# Patient Record
Sex: MALE | Race: WHITE | Employment: UNEMPLOYED | URBAN - METROPOLITAN AREA
[De-identification: names, ages, dates, MRNs, and addresses within clinical notes are randomized per-mention and may not be internally consistent; named-entity substitution may affect disease eponyms.]

---

## 2020-10-20 ENCOUNTER — OFFICE VISIT (OUTPATIENT)
Dept: INTERNAL MEDICINE CLINIC | Age: 25
End: 2020-10-20
Payer: MEDICAID

## 2020-10-20 VITALS
DIASTOLIC BLOOD PRESSURE: 89 MMHG | TEMPERATURE: 98.3 F | WEIGHT: 221 LBS | HEIGHT: 70 IN | HEART RATE: 78 BPM | SYSTOLIC BLOOD PRESSURE: 140 MMHG | BODY MASS INDEX: 31.64 KG/M2

## 2020-10-20 PROBLEM — F33.2 SEVERE EPISODE OF RECURRENT MAJOR DEPRESSIVE DISORDER, WITHOUT PSYCHOTIC FEATURES (HCC): Status: ACTIVE | Noted: 2020-10-20

## 2020-10-20 PROCEDURE — 90686 IIV4 VACC NO PRSV 0.5 ML IM: CPT | Performed by: INTERNAL MEDICINE

## 2020-10-20 PROCEDURE — 90715 TDAP VACCINE 7 YRS/> IM: CPT | Performed by: INTERNAL MEDICINE

## 2020-10-20 PROCEDURE — G8482 FLU IMMUNIZE ORDER/ADMIN: HCPCS | Performed by: INTERNAL MEDICINE

## 2020-10-20 PROCEDURE — 99204 OFFICE O/P NEW MOD 45 MIN: CPT | Performed by: INTERNAL MEDICINE

## 2020-10-20 PROCEDURE — 90472 IMMUNIZATION ADMIN EACH ADD: CPT | Performed by: INTERNAL MEDICINE

## 2020-10-20 PROCEDURE — 1036F TOBACCO NON-USER: CPT | Performed by: INTERNAL MEDICINE

## 2020-10-20 PROCEDURE — 96160 PT-FOCUSED HLTH RISK ASSMT: CPT | Performed by: INTERNAL MEDICINE

## 2020-10-20 PROCEDURE — G8427 DOCREV CUR MEDS BY ELIG CLIN: HCPCS | Performed by: INTERNAL MEDICINE

## 2020-10-20 PROCEDURE — G8417 CALC BMI ABV UP PARAM F/U: HCPCS | Performed by: INTERNAL MEDICINE

## 2020-10-20 PROCEDURE — 90471 IMMUNIZATION ADMIN: CPT | Performed by: INTERNAL MEDICINE

## 2020-10-20 RX ORDER — ESCITALOPRAM OXALATE 20 MG/1
20 TABLET ORAL DAILY
COMMUNITY
End: 2020-11-10 | Stop reason: SDUPTHER

## 2020-10-20 RX ORDER — LAMOTRIGINE 200 MG/1
200 TABLET ORAL DAILY
COMMUNITY
End: 2020-11-10 | Stop reason: SDUPTHER

## 2020-10-20 RX ORDER — ACETAMINOPHEN, ASPIRIN AND CAFFEINE 250; 250; 65 MG/1; MG/1; MG/1
1 TABLET, FILM COATED ORAL EVERY 6 HOURS PRN
Qty: 90 TABLET | Refills: 3 | Status: SHIPPED | OUTPATIENT
Start: 2020-10-20

## 2020-10-20 RX ORDER — CLONIDINE HYDROCHLORIDE 0.1 MG/1
0.1 TABLET ORAL 2 TIMES DAILY
COMMUNITY
End: 2020-10-20 | Stop reason: SINTOL

## 2020-10-20 SDOH — HEALTH STABILITY: MENTAL HEALTH: HOW OFTEN DO YOU HAVE A DRINK CONTAINING ALCOHOL?: MONTHLY OR LESS

## 2020-10-20 ASSESSMENT — PATIENT HEALTH QUESTIONNAIRE - PHQ9
4. FEELING TIRED OR HAVING LITTLE ENERGY: 3
8. MOVING OR SPEAKING SO SLOWLY THAT OTHER PEOPLE COULD HAVE NOTICED. OR THE OPPOSITE, BEING SO FIGETY OR RESTLESS THAT YOU HAVE BEEN MOVING AROUND A LOT MORE THAN USUAL: 3
SUM OF ALL RESPONSES TO PHQ QUESTIONS 1-9: 23
9. THOUGHTS THAT YOU WOULD BE BETTER OFF DEAD, OR OF HURTING YOURSELF: 1
3. TROUBLE FALLING OR STAYING ASLEEP: 3
SUM OF ALL RESPONSES TO PHQ QUESTIONS 1-9: 22
10. IF YOU CHECKED OFF ANY PROBLEMS, HOW DIFFICULT HAVE THESE PROBLEMS MADE IT FOR YOU TO DO YOUR WORK, TAKE CARE OF THINGS AT HOME, OR GET ALONG WITH OTHER PEOPLE: 1
1. LITTLE INTEREST OR PLEASURE IN DOING THINGS: 3
SUM OF ALL RESPONSES TO PHQ QUESTIONS 1-9: 23
2. FEELING DOWN, DEPRESSED OR HOPELESS: 2
6. FEELING BAD ABOUT YOURSELF - OR THAT YOU ARE A FAILURE OR HAVE LET YOURSELF OR YOUR FAMILY DOWN: 2
5. POOR APPETITE OR OVEREATING: 3
SUM OF ALL RESPONSES TO PHQ9 QUESTIONS 1 & 2: 5
7. TROUBLE CONCENTRATING ON THINGS, SUCH AS READING THE NEWSPAPER OR WATCHING TELEVISION: 3

## 2020-10-20 NOTE — PROGRESS NOTES
Vaccine Information Sheet, \"Influenza - Inactivated\"  given to Raúl Ramsey, or parent/legal guardian of  Raúl Ramsey and verbalized understanding. Patient responses:    Have you ever had a reaction to a flu vaccine? No  Do you have any current illness? No  Have you ever had [place] Syndrome? No  Do you have a serious allergy to any of the follow: Neomycin, Polymyxin, Thimerosal, eggs or egg products? No    Flu vaccine given per order. Please see immunization tab. Risks and benefits explained. Current VIS given.       Immunizations Administered     Name Date Dose Route    Influenza, Quadv, IM, PF (6 mo and older Fluzone, Flulaval, Fluarix, and 3 yrs and older Afluria) 10/20/2020 0.5 mL Intramuscular    Site: Deltoid- Left    Lot: [id number]    NDC: 35228-336-68

## 2020-10-20 NOTE — PROGRESS NOTES
10/20/2020    Raúl Ramsey (:  1995) is a 26 y.o. male, here for evaluation of the following medical concerns:    HPI    Depression/anxiety/bipolar 2 - has been on depression medications for 2-3 years. Has seen psychiatrists in the past. Has not seen any psych in a year because of moving to [place], etc. Finished grad school classes [place]. Moved briefly to [facility] and now here. Working on his thesis for [employer]. Deciding on next steps. Started to have an eye twitch after staring meds and then was rx'ed clonidine. Thinks it was for the eye twitching. Clonidine was also rx'ed by psych as well. This is the most recent addition and he things it was in the last 9 months or so it was added. Bps had been elevated as well with a family history of hypertension. He questions the bipolar diagnosis. Has not been hospitalized. No recent suicidal thoughts. Has had in past. Last about 6-8 months ago. Mostly passive. PHQ Scores 10/20/2020   PHQ2 Score 5   PHQ9 Score 23     Interpretation of Total Score Depression Severity: 1-4 = Minimal depression, 5-9 = Mild depression, 10-14 = Moderate depression, 15-19 = Moderately severe depression, 20-27 = Severe depression    Headaches - daily headache in the afternoon/evening. They are dull. Not super painful. Keep from focusing. Located center of forehead. Does have some nasal congestions. Has allergic rhinitis. Sleep makes better. Started over the last few months. After grad school classes were over. Occasionally will take a tylenol or aspirin - doesn't really help when he does. Although they are not severe they interfere with his ability to focus and he finds them quite distracting. He feels they are significantly affecting his mood and quality of life and is distressed by them. Review of Systems   Constitutional: Positive for diaphoresis, fatigue and unexpected weight change. Negative for appetite change and fever.         No night sweats HENT: Negative for congestion, ear pain, hearing loss, nosebleeds, sinus pressure, sinus pain, sneezing, tinnitus and trouble swallowing. Eyes: Negative for photophobia, pain and visual disturbance. Respiratory: Positive for cough and shortness of breath. Negative for wheezing. Cardiovascular: Negative for chest pain, palpitations and leg swelling. Gastrointestinal: Positive for abdominal pain and blood in stool (hemorrhoids). Negative for constipation, diarrhea, nausea and vomiting. +GERD   Endocrine: Negative for cold intolerance, heat intolerance, polydipsia, polyphagia and polyuria. Genitourinary: Negative for difficulty urinating, dysuria, frequency, hematuria and urgency. Musculoskeletal: Negative for arthralgias, back pain, joint swelling, myalgias and neck pain. Skin: Negative for color change and rash. Allergic/Immunologic: Negative for environmental allergies, food allergies and immunocompromised state. Neurological: Positive for headaches. Negative for dizziness, syncope, speech difficulty, weakness, light-headedness and numbness. Hematological: Negative for adenopathy. Does not bruise/bleed easily. Psychiatric/Behavioral: Positive for decreased concentration and dysphoric mood. Negative for sleep disturbance. The patient is not nervous/anxious. Prior to Visit Medications    Medication Sig Taking?  Authorizing Provider   lamoTRIgine (LAMICTAL) 200 MG tablet Take 200 mg by mouth daily Yes Historical Provider, MD   cloNIDine (CATAPRES) 0.1 MG tablet Take 0.1 mg by mouth 2 times daily Yes Historical Provider, MD   escitalopram (LEXAPRO) 20 MG tablet Take 20 mg by mouth daily Yes Historical Provider, MD   aspirin-acetaminophen-caffeine (EXCEDRIN MIGRAINE) 804-847-93 MG per tablet Take 1 tablet by mouth every 6 hours as needed for Headaches Yes Josie Canales MD        Allergies   Allergen Reactions    No Known Allergies        Past Medical History:   Diagnosis Date    Depression        History reviewed. No pertinent surgical history. Social History     Socioeconomic History    Marital status: Single     Spouse name: Not on file    Number of children: Not on file    Years of education: Not on file    Highest education level: Not on file   Occupational History    Not on file   Social Needs    Financial resource strain: Not on file    Food insecurity     Worry: Not on file     Inability: Not on file    Transportation needs     Medical: Not on file     Non-medical: Not on file   Tobacco Use    Smoking status: Never Smoker    Smokeless tobacco: Never Used   Substance and Sexual Activity    Alcohol use: Yes     Frequency: Monthly or less    Drug use: Not on file    Sexual activity: Not on file   Lifestyle    Physical activity     Days per week: Not on file     Minutes per session: Not on file    Stress: Not on file   Relationships    Social connections     Talks on phone: Not on file     Gets together: Not on file     Attends [employer] service: Not on file     Active member of club or organization: Not on file     Attends meetings of clubs or organizations: Not on file     Relationship status: Not on file    Intimate partner violence     Fear of current or ex partner: Not on file     Emotionally abused: Not on file     Physically abused: Not on file     Forced sexual activity: Not on file   Other Topics Concern    Not on file   Social History Narrative    Finishing grad school    Moved to [place] in 2020.     2 cats.          Family History   Problem Relation Age of Onset   Ardyth Moritz Cancer Mother         melanoma    Hypertension Mother     Hypertension Father        Vitals:    10/20/20 1515   BP: (!) 140/89   Site: Right Upper Arm   Position: Sitting   Cuff Size: Large Adult   Pulse: 78   Temp: 98.3 °F (36.8 °C)   Weight: 221 lb (100.2 kg)   Height: 5' 10\" (1.778 m)     Estimated body mass index is 31.71 kg/m² as calculated from the following:    Height as of this encounter: 5' 10\" (1.778 m). Weight as of this encounter: 221 lb (100.2 kg). Physical Exam  Constitutional:       Appearance: He is not ill-appearing or toxic-appearing. Comments: Overweight  Mild speech impediment noted   HENT:      Head: Normocephalic and atraumatic. Right Ear: Tympanic membrane, ear canal and external ear normal.      Left Ear: Tympanic membrane, ear canal and external ear normal.      Nose: Nose normal.      Mouth/Throat:      Pharynx: Oropharynx is clear. Eyes:      Conjunctiva/sclera: Conjunctivae normal.      Pupils: Pupils are equal, round, and reactive to light. Neck:      Musculoskeletal: Normal range of motion and neck supple. Cardiovascular:      Rate and Rhythm: Normal rate and regular rhythm. Pulmonary:      Effort: Pulmonary effort is normal.      Breath sounds: Normal breath sounds. Abdominal:      General: Abdomen is flat. Bowel sounds are normal.      Palpations: Abdomen is soft. Musculoskeletal: Normal range of motion. Right lower leg: No edema. Left lower leg: No edema. Skin:     General: Skin is warm and dry. Findings: No rash. Neurological:      General: No focal deficit present. Mental Status: He is alert. Sensory: No sensory deficit. Motor: No weakness. Coordination: Coordination normal.      Gait: Gait normal.   Psychiatric:         Mood and Affect: Mood normal.         Behavior: Behavior normal.         Thought Content: Thought content normal.         Judgment: Judgment normal.         ASSESSMENT/PLAN:  Monroe Whalen was seen today for established new doctor. Diagnoses and all orders for this visit:    Chronic daily headache  Given timing of headaches and that he is taking clonidine once daily at night I think the clonidine could be the cause of his headaches. I would like to stop this medication as it does not seem he has had much benefit from it to begin with.  We will need to watch his blood pressure (although do not think was helping much with that with once per day dosing and in fact may have been doing more harm than good). In the meantime he can use excedrin for more severe headaches for which he needs to take something. Work on allergy control. Close follow up to examine need for ppx. No red flags for need for imaging at this time. -     aspirin-acetaminophen-caffeine (EXCEDRIN MIGRAINE) 250-250-65 MG per tablet; Take 1 tablet by mouth every 6 hours as needed for Headaches    Allergic rhinitis, unspecified seasonality, unspecified trigger  Wants referral for allergy testing. Discussed to hold antihistamines for 2 weeks prior to appt. -     External Referral To Allergy    Need for influenza vaccination  -     INFLUENZA, QUADV, 3 YRS AND OLDER, IM PF, PREFILL SYR OR SDV, 0.5ML (AFLURIA QUADV, PF)    Need for Tdap vaccination  -     Tdap (age 6y and older) IM (BOOSTRIX)    Severe episode of recurrent major depressive disorder, without psychotic features ([facility] [address]  Depression not well controlled based on PHQ scores. 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 Will have him start [facility] and refer to Psych provider for further examination of diagnosis and treatment options. For now d/c clonidine and continue lamictal 200mg daily and lexapro 20mg daily. Return for 4-6 weeks headaches; next available for Dr. Abiola Hooker. .    An  electronic signature was used to authenticate this note.     --Mackenzie Roa MD on 10/20/2020 at 4:04 PM

## 2020-10-23 ENCOUNTER — VIRTUAL VISIT (OUTPATIENT)
Dept: PSYCHOLOGY | Age: 25
End: 2020-10-23
Payer: MEDICAID

## 2020-10-23 PROCEDURE — 90791 PSYCH DIAGNOSTIC EVALUATION: CPT | Performed by: PSYCHOLOGIST

## 2020-10-23 NOTE — PROGRESS NOTES
Behavioral Health Consultation  Malathi Ford Psy.D. Psychologist  10/23/2020  Start time 3:28pm Stop time 4:00pm    Time spent with Patient: 32 minutes  This is patient's first [facility] appointment. Reason for Consult:  mood  Referring Provider: PCP    Feedback for PCP: No action needed. Megan Lyle will continue to follow pt and obtain records from provider who diagnosed patient with bipolar disorder. At initial visit, pt/guardian provided informed consent for the behavioral health program. Discussed with patient model of service to include the limits of confidentiality (i.e. abuse reporting, suicide intervention, etc.) and short-term intervention focused approach. Pt/guardian indicated understanding    TELEHEALTH VISIT -- Audio and video (During [id number] public health emergency)  }  Pursuant to the emergency declaration under the [facility], [facility] waiver authority and the [employer] Act, this visit was conducted, with patient/guardian's consent, to reduce the patient's risk of exposure to COVID-19 and provide continuity of care for an established patient. Services were provided through a telehealth discussion to substitute for in-person clinic visit. Pt/guardian gave verbal informed consent to participate in telehealth services. Consent:  He and/or health care decision maker is aware that that he may receive a bill for this service, depending on his insurance coverage, and has provided verbal consent to proceed: Yes    Conducted a risk-benefit analysis and determined that the patient's presenting problems are consistent with the use of telepsychology. Determined that the patient has sufficient knowledge and skills in the use of technology enabling them to adequately benefit from telepsychology. It was determined that this patient was able to be properly treated without an in-person session.  Patient/guardian verified that difficulty concentrating, irritability, muscle tension and sleep disturbance, worries about things not going well, reports anxiety attacks that consist of chest pain and increased twitches   SI/HI: passive thoughts of death, denied suicidal ideation, intent, or plan; denied past suicide attempts    History:    Psychiatric history:   Current psychotropic medications:  Lexapro (20mg), Lamictal (200mg)   Past mental health treatment: clonidine (has not been taking for the past few days as advised), tried therapy shortly, but did not find it helpful    Social History:    Social: lives alone, parents , aunt a few hours alone, pets, friends in the area who he has not seem with COVID   Family psychiatric history: father (anxiety)   Employment: working on Masters degree in [employer], not working currently, considering pursuing another masters    Health habits:   Sleep: will sleep 10 or more hours per day; segmented sleep throughout the day   Exercise: Denied   Medication adherence: Yes  Social History     Tobacco Use    Smoking status: Never Smoker    Smokeless tobacco: Never Used   Substance Use Topics    Alcohol use: Yes     Frequency: Monthly or less       Social History     Substance and Sexual Activity   Drug Use Not on file       Current Outpatient Medications   Medication Sig Dispense Refill    lamoTRIgine (LAMICTAL) 200 MG tablet Take 200 mg by mouth daily      escitalopram (LEXAPRO) 20 MG tablet Take 20 mg by mouth daily      aspirin-acetaminophen-caffeine (EXCEDRIN MIGRAINE) 250-250-65 MG per tablet Take 1 tablet by mouth every 6 hours as needed for Headaches 90 tablet 3     No current facility-administered medications for this visit.           O:  MSE:    Appearance: good hygiene   Attitude: cooperative and friendly  Consciousness: alert  Orientation: oriented to person, place, time, general circumstance  Memory: recent and remote memory intact  Attention/Concentration: intact during session  Psychomotor Activity:normal  Eye Contact: normal  Speech: normal rate and volume, well-articulated  Mood: anxious, depressed  Affect: anxious  Perception: within normal limits  Thought Content: within normal limits  Thought Process: logical, coherent and goal-directed  Insight: good  Judgment: intact  Ability to understand instructions: Yes  Ability to respond meaningfully: Yes  Morbid Ideation: no   Suicide Assessment: no suicidal ideation, plan, or intent  Homicidal Ideation: no    A:  Administered PHQ-9 (see below). PHQ Scores 10/20/2020   PHQ2 Score 5   PHQ9 Score 5     Interpretation of Total Score Depression Severity: 1-4 = Minimal depression, 5-9 = Mild depression, 10-14 = Moderate depression, 15-19 = Moderately severe depression, 20-27 = Severe depression    Assessment/Progress in treatment/Treatment plan:  Patient appears to be experiencing anxiety and depression. Has a reported history of bipolar II diagnosis and treatment. Requested that patient obtain past records of bipolar diagnosis for provider to review. Pt was agreeable. Current reports do not fit this diagnosis. May benefit from brief intervention from writer for adaptive coping skill development. Will refer to specialty mental health in the future if indicated. Diagnosis:    1. Moderate episode of recurrent major depressive disorder ([facility] [address]    2. Anxiety       Patient Active Problem List   Diagnosis    Allergic rhinitis    Anxiety    Severe episode of recurrent major depressive disorder, without psychotic features ([facility] [address]        Plan:  Set the following goals: 1) deep breathing 2) review ACT    Follow-up:   Return in about 2 weeks (around 11/6/2020).      Pt interventions:  Established rapport, Corona-setting to identify pt's primary goals for [facility] visit / overall health, Supportive techniques, Provided Psychoeducation re: bipolar disorder, anxiety, Engaged in treatment planning, Emphasized self-care as important for managing overall

## 2020-10-23 NOTE — PATIENT INSTRUCTIONS
What is Acceptance and Commitment Therapy (ACT):     The aim of ACT is to create a rich, full and meaningful life, while accepting the pain that inevitably goes with it.  This type of therapy suggests that when people are struggling in their lives, it is often because they are   1. Living too much in the future or past instead of the present   2. Trying to push away negative thoughts/feelings/memories even though this is not possible  3. Buying into unhelpful thoughts/feelings   4. Defining themselves by their thoughts/feelings/memories/life events   5. Do not know what is important to them or their actions are not in line with what is important to them (e.g., spending time with family is an important value to Narciso Pennington, but he spends 15 hours a day working and does not see his family)   Goals of ACT  o Help people live with unwanted personal experiences (thoughts, feelings, life events, memories) that are out of our control instead of constantly fighting to change or ignore those experiences  o Clarify what is important to a person (their values), and help that person take action in line with their values so that they can live a meaningful life    Deep breathing    Why does this work? When we are stressed or anxious, our stress response in the body is turned on. We can use deep breathing to turn this off. There is a nerve in the body called the, vagus nerve, that helps relax your body. When we take deep, belly breaths, we place pressure on this nerve to help the body relax. Also, you are allowing oxygen to get to your body and brain and breathing out the carbon dioxide which is a toxin to the body. Deep breathing instructions:    1 Sit comfortably, with your knees bent and your shoulders, head and neck relaxed.  You can close your eyes if comfortable or find a comfortable place to focus your eyes (e.g. spot on the wall/floor where eyes are not strained)  2 Place one hand on your upper chest and the other just below your rib cage. This will allow you to feel your diaphragm move as you breathe. 3 Breathe in slowly through your nose so that your stomach moves out against your hand. The hand on your chest should remain as still as possible. 4 Tighten your stomach muscles, letting them fall inward as you exhale through pursed lips. The hand on your upper chest must remain as still as possible. Note: You may notice an increased effort will be needed to use the diaphragm correctly. At first, you'll probably get tired while doing this exercise or find your thoughts wondering. But keep at it, because with continued practice, diaphragmatic breathing will become easy and automatic. How often should I practice this exercise? At first, practice this exercise 3-5 minutes about 3-4 times per day. Gradually increase the amount of time you spend doing this exercise, and perhaps even increase the effort of the exercise by placing a book on your abdomen. It is important to practice this skill regularly, even when you don't \"need\" it. Niranjan: [id number]    AT Internet video:  [email or web address]    TIPS  1. Try to focus on one thought or phrase that is neutral or positive. If your mind wonders, that is okay, bring your attention back to that thought/phrase. 2. Practice when you do not need this skill. It will not work if the first time practicing is during an emotional emergency. Your body and brain need to learn how this skill works  3.  You don't have to be perfect at it, just do your best

## 2020-10-24 ASSESSMENT — ENCOUNTER SYMPTOMS
EYE PAIN: 0
COLOR CHANGE: 0
COUGH: 1
ABDOMINAL PAIN: 1
VOMITING: 0
NAUSEA: 0
SINUS PAIN: 0
DIARRHEA: 0
CONSTIPATION: 0
TROUBLE SWALLOWING: 0
SHORTNESS OF BREATH: 1
BACK PAIN: 0
BLOOD IN STOOL: 1
SINUS PRESSURE: 0
WHEEZING: 0
PHOTOPHOBIA: 0

## 2020-11-09 ENCOUNTER — VIRTUAL VISIT (OUTPATIENT)
Dept: PSYCHOLOGY | Age: 25
End: 2020-11-09
Payer: MEDICAID

## 2020-11-09 PROCEDURE — 90832 PSYTX W PT 30 MINUTES: CPT | Performed by: PSYCHOLOGIST

## 2020-11-09 NOTE — PROGRESS NOTES
Behavioral Health Consultation  Linda Geller Psy.D. Psychologist  11/9/2020    Start time 1:58pm  Stop time 2:25pm    Time spent with Patient: 27 minutes  This is patient's second [facility] appointment. Reason for Consult:  mood  Referring Provider: PCP    Feedback for PCP: No action needed. Adrian Boswell will continue to follow pt and obtain records from provider who diagnosed patient with bipolar disorder. At initial visit, pt/guardian provided informed consent for the behavioral health program. Discussed with patient model of service to include the limits of confidentiality (i.e. abuse reporting, suicide intervention, etc.) and short-term intervention focused approach. Pt/guardian indicated understanding    TELEHEALTH VISIT -- Audio and video (During [facility])  }  Pursuant to the emergency declaration under the [facility], [facility] waiver authority and the [email or web address] and [employer] Act, this visit was conducted, with patient/guardian's consent, to reduce the patient's risk of exposure to COVID-19 and provide continuity of care for an established patient. Services were provided through a telehealth discussion to substitute for in-person clinic visit. Pt/guardian gave verbal informed consent to participate in telehealth services. Consent:  He and/or health care decision maker is aware that that he may receive a bill for this service, depending on his insurance coverage, and has provided verbal consent to proceed: Yes    Conducted a risk-benefit analysis and determined that the patient's presenting problems are consistent with the use of telepsychology. Determined that the patient has sufficient knowledge and skills in the use of technology enabling them to adequately benefit from telepsychology. It was determined that this patient was able to be properly treated without an in-person session.  Patient/guardian verified that they were currently located at the [facility] address that was provided during registration. Verified the following information:  Patient's identification: Yes  Patient location: [address]  [place] [address]  Patient's call back number: [phone]   Patient's emergency contact's name and number, as well as permission to contact them if needed: Extended Emergency Contact Information  Primary Emergency Contact: [address] Phone: [phone]  Relation: Aunt/Uncle  Preferred language: English   needed? No     Provider location: [place] affirm this is an episode with a patient who has not had a related appointment within my department in the past 7 days or scheduled within the next 24 hours. S:    Pt set the following goals at last visit: 1) deep breathing 2) review ACT    Patient reports that he has not gotten records from mental health treatment at his [place]. With regards to reviewing deep breathing and ACT education, patient did not know how to review this on Mychart. He will look again. States that in the past, he has tried deep breathing and it has made him feel anxious. With regards to ACT, he states that he often lives in the future and not the present. Also tries to push away memories.       Depression sx: anhedonia/diminished interest in activities, depressed mood, fatigue, feelings of worthlessness and difficulties with concentration, symptoms have been present most recently for the past few months, periodic depression throughout life   Anxiety sx: excessive worry, uncontrollable worry, restlessness/on edge, fatigue, difficulty concentrating, irritability, muscle tension and sleep disturbance, worries about things not going well, reports anxiety attacks that consist of chest pain and increased twitches   SI/HI: passive thoughts of death, denied suicidal ideation, intent, or plan; denied past suicide attempts    History:    Psychiatric history:   Current psychotropic medications:  Lexapro (20mg), Lamictal (200mg)   Past mental health treatment: clonidine (has not been taking for the past few days as advised), tried therapy shortly, but did not find it helpful    Social History:    Social: lives alone, parents , aunt a few hours alone, pets, friends in the area who he has not seem with COVID   Family psychiatric history: father (anxiety)   Employment: working on Masters degree in [employer], not working currently, considering pursuing another masters    Health habits:   Sleep: will sleep 10 or more hours per day; segmented sleep throughout the day   Exercise: Denied   Medication adherence: Yes  Social History     Tobacco Use    Smoking status: Never Smoker    Smokeless tobacco: Never Used   Substance Use Topics    Alcohol use: Yes     Frequency: Monthly or less       Social History     Substance and Sexual Activity   Drug Use Not on file       Current Outpatient Medications   Medication Sig Dispense Refill    lamoTRIgine (LAMICTAL) 200 MG tablet Take 200 mg by mouth daily      escitalopram (LEXAPRO) 20 MG tablet Take 20 mg by mouth daily      aspirin-acetaminophen-caffeine (EXCEDRIN MIGRAINE) 250-250-65 MG per tablet Take 1 tablet by mouth every 6 hours as needed for Headaches 90 tablet 3     No current facility-administered medications for this visit.           O:  MSE:    Appearance: good hygiene   Attitude: cooperative and friendly  Consciousness: alert  Orientation: oriented to person, place, time, general circumstance  Memory: recent and remote memory intact  Attention/Concentration: variable  Psychomotor Activity:normal  Eye Contact: normal  Speech: normal rate and volume, well-articulated  Mood: anxious, depressed  Affect: tired  Perception: within normal limits  Thought Content: within normal limits  Thought Process: logical, coherent and goal-directed  Insight: good  Judgment: intact  Ability to understand instructions: Yes  Ability to respond meaningfully: Yes  Morbid Ideation: no   Suicide Assessment: no suicidal ideation, plan, or intent  Homicidal Ideation: no    A:  Administered PHQ-9 (see below). PHQ Scores 10/20/2020   PHQ2 Score 5   PHQ9 Score 23     Interpretation of Total Score Depression Severity: 1-4 = Minimal depression, 5-9 = Mild depression, 10-14 = Moderate depression, 15-19 = Moderately severe depression, 20-27 = Severe depression    Assessment/Progress in treatment/Treatment plan:  Patient appears to be experiencing anxiety and depression. Has a reported history of bipolar II diagnosis and treatment. Requested that patient obtain past records of bipolar diagnosis for provider to review. Pt was agreeable. Current reports do not fit this diagnosis. Is engaged in behavioral health treatment. May benefit from additional brief intervention from writer for adaptive coping skill development. Will refer to specialty mental health in the future if indicated. Diagnosis:    1. Moderate episode of recurrent major depressive disorder ([facility]    2. Anxiety       Patient Active Problem List   Diagnosis    Allergic rhinitis    Anxiety    Severe episode of recurrent major depressive disorder, without psychotic features [facility]        Plan:  Set the following goals: 1) deep breathing 2) obtain old records 3) mindfulness    Follow-up:   Return in about 3 weeks (around 11/30/2020).      Pt interventions:  Established rapport, [place]-setting to identify pt's primary goals for [facility] visit / overall health, Supportive techniques, Engaged in treatment planning, Emphasized self-care as important for managing overall health, Introduced Acceptance and Commitment Therapy, Educated about mindfulness and engaged in mindfulness practice of present moment and Trained in diaphragmatic breathing

## 2020-11-09 NOTE — PATIENT INSTRUCTIONS
Mindfulness with the senses    You can follow this exercise that has you spend one minute on each sense or you can pick to describe in detail  3 things you hear, smell, see, taste, touch. Sit in a comfortable upright position with your feet planted flat on the ground. Rest your hands on your thighs or on your desk. Notice your breath. No need to breathe in any particular way. Just bring attention to each part of the breath- the inhale, exhale, and space in between. Bring awareness to each of your 5 senses. The point here is to focus on the present moment and how each sense is being activated in that moment. Hear: For one minute, begin to notice all of the sounds around you. Try not to  the sounds- just notice them. They are not good or bad, they just are. Sounds might be internal, like breathing or digestion. Sounds might be close by or more distant like the sound of traffic. Are you now hearing more than you were before you started? You may begin to notice subtle sounds you did not hear before. Can you hear them now? Smell: For one minute, shift your attention to notice the smells of your environment. Maybe you smell food. You might become aware of the smell of trees or plants if you are outside. You might notice the smell of books or paper. Sometimes closing your eyes can help sharpen your attention. See: For one minute, observe your surrounding and notice the colors, shapes and textures. If you really look, you may notice things that have gone unnoticed. Taste: For one minute, you can do this one even if you have food in your mouth. You may notice an aftertaste of a previous drink or meal. You can just notice your tongue in your mouth, your saliva, and your breath as you exhale. We have tastes in our mouth that often go unnoticed. You can run your tongue over your teeth and cheeks to help you become more aware. Touch:  For one minute, bring your attention to the sensations of skin contact with

## 2020-11-30 ENCOUNTER — VIRTUAL VISIT (OUTPATIENT)
Dept: PSYCHOLOGY | Age: 25
End: 2020-11-30
Payer: MEDICAID

## 2020-11-30 PROCEDURE — 90832 PSYTX W PT 30 MINUTES: CPT | Performed by: PSYCHOLOGIST

## 2020-11-30 NOTE — PROGRESS NOTES
Behavioral Health Consultation  Jeovanny Miller Psy.D. Psychologist  11/30/2020      Start time 1:30pm  Stop time 2:00pm    Time spent with Patient: 30 minutes  This is patient's third [facility] appointment. Reason for Consult:  mood  Referring Provider: PCP    Feedback for PCP: No action needed. Bridger Camacho will continue to follow pt and obtain records from provider who diagnosed patient with bipolar disorder. At initial visit, pt/guardian provided informed consent for the behavioral health program. Discussed with patient model of service to include the limits of confidentiality (i.e. abuse reporting, suicide intervention, etc.) and short-term intervention focused approach. Pt/guardian indicated understanding    TELEHEALTH VISIT -- Audio and video (During JOWDS-01 public health emergency)  }  Pursuant to the emergency declaration under the [facility][note 3][facility] waiver authority and the [employer] and [employer] Act, this visit was conducted, with patient/guardian's consent, to reduce the patient's risk of exposure to COVID-19 and provide continuity of care for an established patient. Services were provided through a telehealth discussion to substitute for in-person clinic visit. Pt/guardian gave verbal informed consent to participate in telehealth services. Consent:  He and/or health care decision maker is aware that that he may receive a bill for this service, depending on his insurance coverage, and has provided verbal consent to proceed: Yes    Conducted a risk-benefit analysis and determined that the patient's presenting problems are consistent with the use of telepsychology. Determined that the patient has sufficient knowledge and skills in the use of technology enabling them to adequately benefit from telepsychology. It was determined that this patient was able to be properly treated without an in-person session.  Patient/guardian verified that they were currently located at the [facility] address that was provided during registration. Verified the following information:  Patient's identification: Yes  Patient location: [address]  [place] [address]  Patient's call back number: [phone]   Patient's emergency contact's name and number, as well as permission to contact them if needed: Extended Emergency Contact Information  Primary Emergency Contact: [address] Phone: [phone]  Relation: Aunt/Uncle  Preferred language: English   needed? No     Provider location: [address] affirm this is an episode with a patient who has not had a related appointment within my department in the past 7 days or scheduled within the next 24 hours. S:    Pt set the following goals at last visit: 1) deep breathing 2) obtain old records 3) mindfulness    Patient reports that his mood has been up and down lately. States that overall he is doing pretty well. Is in the process of waiting for a number of things. Wants to go back to work, but does not want to go right now due to Covid. Also plans on enrolling in a different graduate school program.  Is in the process of waiting to do this. Feels that life is pretty calm right now, would like to follow-up in the future once some of his stressors arise again.      Depression sx: anhedonia/diminished interest in activities, depressed mood, fatigue, feelings of worthlessness and difficulties with concentration, symptoms have been present most recently for the past few months, periodic depression throughout life   Anxiety sx: excessive worry, uncontrollable worry, restlessness/on edge, fatigue, difficulty concentrating, irritability, muscle tension and sleep disturbance, worries about things not going well, reports anxiety attacks that consist of chest pain and increased twitches   SI/HI: passive thoughts of death, denied suicidal ideation, intent, or plan; denied past suicide attempts    History:    Psychiatric history:   Current psychotropic medications:  Lexapro (20mg), Lamictal (200mg)   Past mental health treatment: clonidine (has not been taking for the past few days as advised), tried therapy shortly, but did not find it helpful    Social History:    Social: lives alone, parents , aunt a few hours alone, pets, friends in the area who he has not seem with COVID   Family psychiatric history: father (anxiety)   Employment: working on Masters degree in [employer], not working currently, considering pursuing another [employer]    Health habits:   Sleep: will sleep 10 or more hours per day; segmented sleep throughout the day   Exercise: Denied   Medication adherence: Yes  Social History     Tobacco Use    Smoking status: Never Smoker    Smokeless tobacco: Never Used   Substance Use Topics    Alcohol use: Yes     Frequency: Monthly or less       Social History     Substance and Sexual Activity   Drug Use Not on file       Current Outpatient Medications   Medication Sig Dispense Refill    lamoTRIgine (LAMICTAL) 200 MG tablet Take 1 tablet by mouth daily 30 tablet 5    escitalopram (LEXAPRO) 20 MG tablet Take 1 tablet by mouth daily 30 tablet 5    aspirin-acetaminophen-caffeine (EXCEDRIN MIGRAINE) 250-250-65 MG per tablet Take 1 tablet by mouth every 6 hours as needed for Headaches 90 tablet 3     No current facility-administered medications for this visit.           O:  MSE:    Appearance: good hygiene   Attitude: cooperative and friendly  Consciousness: alert  Orientation: oriented to person, place, time, general circumstance  Memory: recent and remote memory intact  Attention/Concentration: variable  Psychomotor Activity:normal  Eye Contact: normal  Speech: normal rate and volume, well-articulated  Mood: up and down  Affect: blunted  Perception: within normal limits  Thought Content: within normal limits  Thought Process: logical, coherent and goal-directed  Insight:

## 2020-12-03 ENCOUNTER — VIRTUAL VISIT (OUTPATIENT)
Dept: INTERNAL MEDICINE CLINIC | Age: 25
End: 2020-12-03
Payer: MEDICAID

## 2020-12-03 PROCEDURE — 99214 OFFICE O/P EST MOD 30 MIN: CPT | Performed by: INTERNAL MEDICINE

## 2020-12-03 PROCEDURE — G8427 DOCREV CUR MEDS BY ELIG CLIN: HCPCS | Performed by: INTERNAL MEDICINE

## 2020-12-03 NOTE — PROGRESS NOTES
12/3/2020    TELEHEALTH EVALUATION -- Audio/Visual (During KZPUZ-44 public health emergency)  Radha Martinez MD  [facility]    HPI:    Santiago Rodriguez (:  1995) has requested an audio/video evaluation for the following concern(s):    Headaches are much less often and much less severe since stopping the clonidine. No negative psych effects since stopping. Does not have ability to check his BP at home. His depression is still present. Having much fewer suicidal thoughts. Not as prominent as they were when his headaches were worse. Sleeping better. Has had 3 sessions with Dr. Marilee Craig. 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 He is working on getting his records from [facility] and [place]. Review of Systems   Constitutional: Negative for chills, fatigue and fever. Psychiatric/Behavioral: Positive for dysphoric mood and suicidal ideas (occasional, passive, decreasing). Negative for sleep disturbance. The patient is nervous/anxious. Prior to Visit Medications    Medication Sig Taking? Authorizing Provider   lamoTRIgine (LAMICTAL) 200 MG tablet Take 1 tablet by mouth daily  Luly Menon MD   escitalopram (LEXAPRO) 20 MG tablet Take 1 tablet by mouth daily  Luly Menon MD   aspirin-acetaminophen-caffeine (EXCEDRIN MIGRAINE) 092-551-98 MG per tablet Take 1 tablet by mouth every 6 hours as needed for Headaches  Luly Menon MD       Social History     Tobacco Use    Smoking status: Never Smoker    Smokeless tobacco: Never Used   Substance Use Topics    Alcohol use: Yes     Frequency: Monthly or less    Drug use: Not on file        Past Medical History:   Diagnosis Date    Depression    , History reviewed. No pertinent surgical history. ,   Social History     Tobacco Use    Smoking status: Never Smoker    Smokeless tobacco: Never Used   Substance Use Topics    Alcohol use:  Yes Frequency: Monthly or less    Drug use: Not on file   ,   Family History   Problem Relation Age of Onset    Cancer Mother         melanoma    Hypertension Mother     Hypertension Father        PHYSICAL EXAMINATION:    General - well developed, well nourished, NAD  Mental status - Awake and alert, Oriented x 3. Follows commands. Eyes - EOMI, Sclera normal, No conjunctival discharge or injection  HENT: NCAT, MMM. Normal external ears  Neck - no visualized masses, nl ROM  Pulmonary/Chest - Speaking in full sentences, effort normal, no distress. MSK - Normal gait with no signs of ataxia. Neuro - No facial asymmetry, no gaze palzy  Skin - no rashes or discoloration of facial skin  Psych - nl appearance and grooming, affect appropriate to mood, no hallucinations  Other pertinent observable physical exam findings-       ASSESSMENT/PLAN:  1. Chronic daily headache  Improved off of clonidine. Continue to monitor. Prn excedrin okay. 2. Severe episode of recurrent major depressive disorder, without psychotic features ([address]  Slightly improved without any real medication changes but with initiation of psychotherapy. Will send to psych for help with medication mgmt. Encouraged him to come to our office to sign a records release for us to fax since he is having trouble getitng his records sent. 25 minutes spent with patient- >50 % continuity of care and/or counseling re: depression    Return in about 6 months (around 6/3/2021) for Physical.    Nancy Lezama is a 26 y.o. male being evaluated by a Virtual Visit (video visit) encounter to address concerns as mentioned above. A caregiver was present when appropriate. Due to this being a TeleHealth encounter (During [place] [address] public health emergency), evaluation of the following organ systems was limited: Vitals/Constitutional/EENT/Resp/CV/GI//MS/Neuro/Skin/Heme-Lymph-Imm.   Pursuant to the emergency declaration under the [facility], [facility] waiver authority and the [facility] Appropriations Act, this Virtual Visit was conducted with patient's (and/or legal guardian's) consent, to reduce the patient's risk of exposure to COVID-19 and provide necessary medical care. The patient (and/or legal guardian) has also been advised to contact this office for worsening conditions or problems, and seek emergency medical treatment and/or call 911 if deemed necessary. Patient identification was verified at the start of the visit: YES    Total time spent on this encounter: 25 minutes    Services were provided through a video synchronous discussion virtually to substitute for in-person clinic visit. Patient and provider were located at their individual homes. --Keisha Reyes MD on 12/3/2020 at 4:25 PM    An electronic signature was used to authenticate this note.

## 2020-12-04 NOTE — PROGRESS NOTES
Outpatient Initial Psychiatric Evaluation    Santiago Rodriguez  : 1995     Chief Complaint: Depression, anxiety    History of Present Illness:  Context: Stress  Location: Mood, anxiety  Duration: Ongoing   Severity: Moderate  Associated Symptoms: See below     Initial Presentation:  Taken from Dr. Hannah Christianson Note (2020)  Patient reports that his mood has been up and down lately. States that overall he is doing pretty well. Is in the process of waiting for a number of things. Wants to go back to work, but does not want to go right now due to Covid. Also plans on enrolling in a different graduate school program.  Is in the process of waiting to do this. Feels that life is pretty calm right now, would like to follow-up in the future once some of his stressors arise again.     · Depression sx: anhedonia/diminished interest in activities, depressed mood, fatigue, feelings of worthlessness and difficulties with concentration, symptoms have been present most recently for the past few months, periodic depression throughout life  · Anxiety sx: excessive worry, uncontrollable worry, restlessness/on edge, fatigue, difficulty concentrating, irritability, muscle tension and sleep disturbance, worries about things not going well, reports anxiety attacks that consist of chest pain and increased twitches  · SI/HI: passive thoughts of death, denied suicidal ideation, intent, or plan; denied past suicide attempts    INITIAL PSYCHIATRIC ASSESMENT:  Santiago Rodriguez is a 26 y.o. male with a history of bipolar II and anxiety who was referred to the outpatient psychiatric clinic. History was obtained from patient and chart review. Akila Collins reports that he was diagnosed with Bipolar II around the age of 22 and is unsure if this is an accurate diagnosis. He states \"there were some communication issues at that time and I think things that were read as manic, weren't actually manic\".  He noted some irritability and mood swings but denies s/s of josse, denies increased energy, being awake for long periods of time, hallucinations. He would like to work on reevaluating his diagnosis and to ensure that he is on the right medications. He endorses anxiety (\"6/10\", worried about needing to get things done and the future, doesn't like loud noises, dreads leaving the house, fidgets, feeling panicky, chest tightness) and depression (\"6/10\" low energy, low motivation, lack of interest, feelings of hopelessness, extreme fatigue). These symptoms have worsened since the COVID pandemic began due to lack of deadlines, lack of drive and no incentive to get things done. He reports a history of procrastination but was always able to focus and get things done when he would actually sit down to do it. It is not helpful to give himself deadlines because \"they aren't real deadlines that effect me\". This is causing some issues as he is currently working on his Masters thesis and does not have the motivation to get this done. He has a history of passive suicidal ideations of no longer wanting to be alive but denies having a plan or intent to act on these thoughts. He reports that these thoughts come and go and that most recently occurred a few months ago. Denies current SI. Reports variable sleep pattern, denies issues falling or staying asleep but notes napping throughout the day, sleeps more than 8 hours/day in intervals. Appetite is okay, denies any unexpected weight changes. He has been on Lamictal and Lexapro since 2017 with no recent dose changes. He believes they are helpful but notes he is \"not sure what not helping feels like\". He does report going off Clonidine somewhat recently due to headaches. He does not believe the Clonidine was doing anything for his mood or anxiety. He still has occasional headaches but these are improved.      He has seen Dr. Araceli Wood in the past but notes currently being on hiatus because nothing has changed recently in regards to situation/stressors. They have discussed coping skills such as deep breathing and focusing on the senses, which he has been trying to use but does not necessarily find them helpful as of yet. Past Psychiatric History:  Past Diagnosis: Bipolar II, depression, anxiety  Past Suicide Attempts: Denies   Past Violence: Denies   Previous Admits: Denies   Outpatient Services: Previously established with outpatient psychiatric providers while at college  Past Medication Trials: Clonidine, Lamictal, Lexapro, Paxil, Rexulti  Access to Firearms: No  Family Hx of Psychiatric Disorders: Father (anxiety)  Family History of Completed Suicide: Denies     Substance Abuse History:  Nicotine: Denies   ETOH: Occasional, 2-3/week, 1 mixed drink  Illicit: Denies   Prescription: Denies     Controlled Substance Monitoring:    Acute and Chronic Pain Monitoring:   No flowsheet data found. Psychosocial/Family History:   Developmental: From \[facility] [place] [facility]\""; recently relocated to [place] in November 2019  Support System: Friend, family (not currently biggest support)  Relationship Status: Single   Children: None   Housing: Lives by self   Education: Graduated from [employer] in 2018, currently working on 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  [address] [facility] [place], [place]: Denies   Legal: Denies   Abuse/Trauma: Denies     Current Meds  Current Outpatient Medications on File Prior to Visit   Medication Sig Dispense Refill    lamoTRIgine (LAMICTAL) 200 MG tablet Take 1 tablet by mouth daily 30 tablet 5    escitalopram (LEXAPRO) 20 MG tablet Take 1 tablet by mouth daily 30 tablet 5    aspirin-acetaminophen-caffeine (EXCEDRIN MIGRAINE) 250-250-65 MG per tablet Take 1 tablet by mouth every 6 hours as needed for Headaches 90 tablet 3     No current facility-administered medications on file prior to visit. History:  Past Medical History:   Diagnosis Date    Anxiety     Depression       History reviewed.  No pertinent surgical history. Family History   Problem Relation Age of Onset   Zannie Cowden Cancer Mother         melanoma    Hypertension Mother     Hypertension Father     Anxiety Disorder Father      Social History     Socioeconomic History    Marital status: Single     Spouse name: None    Number of children: None    Years of education: None    Highest education level: None   Occupational History    None   Social Needs    Financial resource strain: None    Food insecurity     Worry: None     Inability: None    Transportation needs     Medical: None     Non-medical: None   Tobacco Use    Smoking status: Never Smoker    Smokeless tobacco: Never Used   Substance and Sexual Activity    Alcohol use: Yes     Frequency: Monthly or less     Comment: Occasional, 2-3 x/wk, 1 mixed drink    Drug use: Never    Sexual activity: None   Lifestyle    Physical activity     Days per week: None     Minutes per session: None    Stress: None   Relationships    Social connections     Talks on phone: None     Gets together: None     Attends Mu-ism service: None     Active member of club or organization: None     Attends meetings of clubs or organizations: None     Relationship status: None    Intimate partner violence     Fear of current or ex partner: None     Emotionally abused: None     Physically abused: None     Forced sexual activity: None   Other Topics Concern    None   Social History Narrative    Finishing grad school    Moved to [place] [employer] [place] in 2020.     2 cats. Not in a hospital admission. Allergies   Allergen Reactions    No Known Allergies       Review of Systems  Review of Systems   Constitutional: Negative for activity change, appetite change and unexpected weight change. HENT: Negative for congestion, sneezing and sore throat. Respiratory: Negative for chest tightness and shortness of breath. Cardiovascular: Negative for chest pain.    Gastrointestinal: Negative for abdominal pain, diarrhea, nausea and vomiting. Musculoskeletal: Negative for arthralgias and myalgias. Neurological: Negative for dizziness, light-headedness and headaches. Psychiatric/Behavioral: Positive for decreased concentration, dysphoric mood and sleep disturbance. Negative for hallucinations, self-injury and suicidal ideas. The patient is nervous/anxious. Decreased focus, motivation, energy   All other systems reviewed and are negative. Physical Exam  Physical Exam  Vitals signs reviewed. Constitutional:       General: He is not in acute distress. Appearance: Normal appearance. Interventions: Face mask in place. HENT:      Head: Normocephalic. Cardiovascular:      Rate and Rhythm: Normal rate. Pulmonary:      Effort: Pulmonary effort is normal.   Musculoskeletal: Normal range of motion. Neurological:      Mental Status: He is alert. Mental status is at baseline. Gait: Gait is intact. Psychiatric:         Attention and Perception: Attention and perception normal.         Mood and Affect: Mood and affect normal.         Behavior: Behavior normal. Behavior is cooperative. Thought Content: Thought content normal.         Cognition and Memory: Cognition and memory normal.         Judgment: Judgment normal.     OBJECTIVE  Vital Signs:  Vitals:    12/07/20 1037   BP: 120/87   Temp: 98.1 °F (36.7 °C)     Labs:  No results found for any previous visit.       Last Drug screen:   No results found for: Nelia Keyes, LABBENZ, COCAIMETSCRU, THC, MDMA, LABMETH, [address], OXTCOSU, PHENCYCLIDINESCREENURINE, PROPOXYPHENE, METAMPU    PSYCHIATRIC ASSESSMENT     Mental Status Examination:    Appearance: WM, appears stated age, wearing personal attire, good grooming and hygiene  Behavior/Attitude Toward Examiner: Cooperative, pleasant, attentive, fair eye contact  Speech: Spontaneous, normal rate, normal volume  Mood: \"Okay\"  Affect: Mood congruent  Thought Processes: Logical, linear  Thought Content: Denies current SI, denies HI, no delusions voiced, no obsessions  Perceptions: Denies AVH, did not appear to be RTIS  Attention: Intact  Abstraction: Intact  Cognition: Alert and oriented to person, place, time, and situation, recall intact  Insight: WNL   Judgment: WNL     Diagnoses  Encounter Diagnoses   Name Primary?  Mood disorder ([facility] [address] Yes    Anxiety       Plan   Reviewed outpatient provider, nursing, and ancillary staff notes. Evaluated medications and assessed for side effects and effectiveness. Assessed patient's educational needs including reviewing Plan of Care, medications and diagnosis. 1. Safety: NO Imminent risk of danger to/self/others based on the factors considered below. Appropriate for outpatient level of care. Safety plan includes: 911, PES, hotlines, and interventions discussed today.  Risk factors: Age <25 or >49, male gender, depressed mood, social isolation.  Protective factors: Denies current suicidal ideation/plan/intent, does not have access to guns or weapons, patient is future oriented in conversation and is olaf for safety, no prior suicide attempts, no family h/o suicide, no substance abuse, patient has social or family support, physically healthy, compliant with recommended medications. 2.  Psychiatric   Medications: Currently on Lamictal 200 mg daily and Lexapro 20 mg daily. R/B/SE/A reviewed with patient who consents to treatment.  OARRS ran and reviewed   Labs: reviewed in Epic   12/7: Discussed purpose and doses of current medications, possible adjustment if warranted. [facility] states he will attempt to get outside records for review prior to any adjustments/changes. 3.  Substance Abuse    No active issues  4. Medical   Following with Marilyn Walter MD  5. Return in about 2 weeks (around 12/21/2020). Patient prefers virtual visit for follow-up.     Yeni Reza, MPH, PMHNP-BC  12/07/20    Total time: 47 minutes spent with patient completing evaluation process and more than 50% of the time was spent completing evaluation and planning treatment with the patient.

## 2020-12-07 ENCOUNTER — OFFICE VISIT (OUTPATIENT)
Dept: PSYCHIATRY | Age: 25
End: 2020-12-07
Payer: MEDICAID

## 2020-12-07 VITALS — SYSTOLIC BLOOD PRESSURE: 120 MMHG | TEMPERATURE: 98.1 F | DIASTOLIC BLOOD PRESSURE: 87 MMHG

## 2020-12-07 PROBLEM — F33.2 SEVERE EPISODE OF RECURRENT MAJOR DEPRESSIVE DISORDER, WITHOUT PSYCHOTIC FEATURES (HCC): Status: RESOLVED | Noted: 2020-10-20 | Resolved: 2020-12-07

## 2020-12-07 PROBLEM — F39 MOOD DISORDER (HCC): Status: ACTIVE | Noted: 2020-12-07

## 2020-12-07 PROCEDURE — 99204 OFFICE O/P NEW MOD 45 MIN: CPT | Performed by: NURSE PRACTITIONER

## 2020-12-08 ASSESSMENT — ENCOUNTER SYMPTOMS
VOMITING: 0
NAUSEA: 0
DIARRHEA: 0
SORE THROAT: 0
SHORTNESS OF BREATH: 0
ABDOMINAL PAIN: 0
CHEST TIGHTNESS: 0

## 2020-12-11 ENCOUNTER — TELEPHONE (OUTPATIENT)
Dept: PSYCHIATRY | Age: 25
End: 2020-12-11

## 2020-12-11 NOTE — TELEPHONE ENCOUNTER
LVM for patient to call back to reschedule 12/24 appointment with Ngoc Carvajal. Gaib will be unavailable on 12/24.

## 2020-12-31 ENCOUNTER — VIRTUAL VISIT (OUTPATIENT)
Dept: INTERNAL MEDICINE CLINIC | Age: 25
End: 2020-12-31
Payer: MEDICAID

## 2020-12-31 PROCEDURE — G8482 FLU IMMUNIZE ORDER/ADMIN: HCPCS | Performed by: INTERNAL MEDICINE

## 2020-12-31 PROCEDURE — G8427 DOCREV CUR MEDS BY ELIG CLIN: HCPCS | Performed by: INTERNAL MEDICINE

## 2020-12-31 PROCEDURE — 99214 OFFICE O/P EST MOD 30 MIN: CPT | Performed by: INTERNAL MEDICINE

## 2020-12-31 PROCEDURE — G8417 CALC BMI ABV UP PARAM F/U: HCPCS | Performed by: INTERNAL MEDICINE

## 2020-12-31 PROCEDURE — 1036F TOBACCO NON-USER: CPT | Performed by: INTERNAL MEDICINE

## 2020-12-31 RX ORDER — CLINDAMYCIN PHOSPHATE 10 MG/G
GEL TOPICAL
Qty: 60 G | Refills: 0 | Status: SHIPPED | OUTPATIENT
Start: 2020-12-31 | End: 2021-01-07

## 2020-12-31 NOTE — PROGRESS NOTES
2020    TELEHEALTH EVALUATION -- Audio/Visual (During OQZOY-98 public health emergency)  Caroline Ibarra MD  [facility]    HPI:    Michael Raymond (:  1995) has requested an audio/video evaluation for the following concern(s):    Headaches have continued to improve off of the clonidine. He is happy with how things are going with regards to this. Working with Laura Lucio to determine best medication combination. Acne - has had since early teenager. Previously saw dermatology and was given dapsone topical. Doesn't know if this really helped. Lots of prior scarring. He has cystic acne mostly around the edges of his cheeks and chin although does have some on body as well. Currently washing face with soap and water and using cerave moisturizer for face. Review of Systems   Constitutional: Negative for chills, fatigue and fever. Skin:        +acne   Neurological: Negative for headaches. Psychiatric/Behavioral: Positive for dysphoric mood. Prior to Visit Medications    Medication Sig Taking? Authorizing Provider   lamoTRIgine (LAMICTAL) 200 MG tablet Take 1 tablet by mouth daily Yes Odell Pace MD   escitalopram (LEXAPRO) 20 MG tablet Take 1 tablet by mouth daily Yes Odell Pace MD   aspirin-acetaminophen-caffeine (Micheal Proper) 130-109-26 MG per tablet Take 1 tablet by mouth every 6 hours as needed for Headaches Yes Odell Pace MD       Social History     Tobacco Use    Smoking status: Never Smoker    Smokeless tobacco: Never Used   Substance Use Topics    Alcohol use: Yes     Frequency: Monthly or less     Comment: Occasional, 2-3 x/wk, 1 mixed drink    Drug use: Never        Past Medical History:   Diagnosis Date    Anxiety     Depression    , History reviewed. No pertinent surgical history. ,   Social History     Tobacco Use    Smoking status: Never Smoker    Smokeless tobacco: Never Used   Substance Use Topics    Alcohol use:  Yes Frequency: Monthly or less     Comment: Occasional, 2-3 x/wk, 1 mixed drink    Drug use: Never   ,   Family History   Problem Relation Age of Onset    Cancer Mother         melanoma    Hypertension Mother     Hypertension Father     Anxiety Disorder Father        PHYSICAL EXAMINATION:    General - well developed, well nourished, NAD  Mental status - Awake and alert, Oriented x 3. Follows commands. Eyes - EOMI, Sclera normal, No conjunctival discharge or injection  HENT: NCAT, MMM. Normal external ears  Neck - no visualized masses, nl ROM  Pulmonary/Chest - Speaking in full sentences, effort normal, no distress. MSK - Normal gait with no signs of ataxia. Neuro - No facial asymmetry, no gaze palzy  Skin - cystic pustules of face with prior acne scar noted  Psych - nl appearance and grooming, affect appropriate to mood, no hallucinations  Other pertinent observable physical exam findings-       ASSESSMENT/PLAN:  1. Mood disorder ([address]  Seeing psych. Continue off of clonidine. 2. Anxiety  See above. 3. Chronic daily headache  Improved off of clonidine. 4. Acne vulgaris  New, worsening with significant prior scarring. Discussed the pillars of acne treatment. He will use OTC salicylic acid face wash, clindamycin gel daytime and tretinoin at bedtime. Discussed irritation and using moisturizer as way to avoid. Can also back off on how often using irritating medications. Sun protection. Consider oral antibiotics. Refer to derm but treat in the meantime.   - Nisha Wren MD, Dermatology, [facility]  - clindamycin (CLEOCIN-T) 1 % gel; Apply topically once daily in the morning  Dispense: 60 g; Refill: 0  - tretinoin (RETIN-A) 0.025 % cream; Apply topically nightly.   Dispense: 45 g; Refill: 1      Return in about 6 months (around 6/30/2021) for Physical. Blanca Alvarez is a 26 y.o. male being evaluated by a Virtual Visit (video visit) encounter to address concerns as mentioned above. A caregiver was present when appropriate. Due to this being a TeleHealth encounter ([facility]), evaluation of the following organ systems was limited: Vitals/Constitutional/EENT/Resp/CV/GI//MS/Neuro/Skin/Heme-Lymph-Imm. Pursuant to the emergency declaration under the [address] [place] [address] and the [employer] Act, this Virtual Visit was conducted with patient's (and/or legal guardian's) consent, to reduce the patient's risk of exposure to COVID-19 and provide necessary medical care. The patient (and/or legal guardian) has also been advised to contact this office for worsening conditions or problems, and seek emergency medical treatment and/or call 911 if deemed necessary. Patient identification was verified at the start of the visit: YES    Total time spent on this encounter: Not billed by time    Services were provided through a video synchronous discussion virtually to substitute for in-person clinic visit. Patient and provider were located at their individual homes. --Rachel Li MD on 12/31/2020 at 11:50 AM    An electronic signature was used to authenticate this note.

## 2021-01-27 ASSESSMENT — ENCOUNTER SYMPTOMS
SHORTNESS OF BREATH: 0
NAUSEA: 0
SORE THROAT: 0
DIARRHEA: 0
ABDOMINAL PAIN: 0
VOMITING: 0
CHEST TIGHTNESS: 0

## 2021-01-27 NOTE — PROGRESS NOTES
Outpatient Follow-Up Psychiatric Evaluation    Jose Irvin  : 1995     Chief Complaint: Depression, anxiety    History of Present Illness:  Context: Stress  Location: Mood, anxiety  Duration: Ongoing   Severity: Moderate  Associated Symptoms: Depression, irritability, anxiety, decreased motivation and interest in activities, difficulty concentrating, worry, feeling on edge    SUBJECTIVE/UPDATE:  Jose Irvin reports feeling \"okay\" since last time we spoke. He did decrease the Lamictal as we discussed and, although he has not noticed any elevated mood or energy, no periods of decreased sleep and not feeling tired, no increase in impulsive behaviors, he has noticed an increase in daily headaches. Notes that Excedrin is somewhat helpful. Mood continues to be \"much more down than up\", no changes, denies thoughts of self-harm, suicidal ideation. Continues to endorse low energy and lack of motivation, sleeping a lot (10+ hours, naps throughout the day). Also continues to report increased anxiety secondary to leaving apartment, driving, being around others, and COVID. Has applied to a few jobs but hasn't heard back yet. Review of Systems   Review of Systems   Constitutional: Negative for activity change, appetite change and unexpected weight change. HENT: Negative for congestion, sneezing and sore throat. Respiratory: Negative for chest tightness and shortness of breath. Cardiovascular: Negative for chest pain. Gastrointestinal: Negative for abdominal pain, diarrhea, nausea and vomiting. Musculoskeletal: Negative for arthralgias and myalgias. Neurological: Negative for dizziness, light-headedness and headaches. Psychiatric/Behavioral: Positive for decreased concentration, dysphoric mood and sleep disturbance. Negative for hallucinations and suicidal ideas. The patient is nervous/anxious. All other systems reviewed and are negative.     Current Meds Current Outpatient Medications on File Prior to Visit   Medication Sig Dispense Refill    tretinoin (RETIN-A) 0.025 % cream Apply topically nightly. 45 g 1    escitalopram (LEXAPRO) 20 MG tablet Take 1 tablet by mouth daily 30 tablet 5    aspirin-acetaminophen-caffeine (EXCEDRIN MIGRAINE) 250-250-65 MG per tablet Take 1 tablet by mouth every 6 hours as needed for Headaches 90 tablet 3     No current facility-administered medications on file prior to visit. History:  Past Medical History:   Diagnosis Date    Anxiety     Depression       History reviewed. No pertinent surgical history.   Family History   Problem Relation Age of Onset   Sowmya eKndall Cancer Mother         melanoma    Hypertension Mother     Hypertension Father     Anxiety Disorder Father      Social History     Socioeconomic History    Marital status: Single     Spouse name: None    Number of children: None    Years of education: None    Highest education level: None   Occupational History    None   Social Needs    Financial resource strain: None    Food insecurity     Worry: None     Inability: None    Transportation needs     Medical: None     Non-medical: None   Tobacco Use    Smoking status: Never Smoker    Smokeless tobacco: Never Used   Substance and Sexual Activity    Alcohol use: Yes     Frequency: Monthly or less     Comment: Occasional, 2-3 x/wk, 1 mixed drink    Drug use: Never    Sexual activity: None   Lifestyle    Physical activity     Days per week: None     Minutes per session: None    Stress: None   Relationships    Social connections     Talks on phone: None     Gets together: None     Attends Voodoo service: None     Active member of club or organization: None     Attends meetings of clubs or organizations: None     Relationship status: None    Intimate partner violence     Fear of current or ex partner: None     Emotionally abused: None     Physically abused: None     Forced sexual activity: None 1. Safety: NO Imminent risk of danger to/self/others based on the factors considered below. Appropriate for outpatient level of care. Safety plan includes: 911, PES, hotlines, and interventions discussed today. · Risk factors: Age <25 or >49, male gender, depressed mood, social isolation. · Protective factors: Denies current suicidal ideation/plan/intent, does not have access to guns or weapons, patient is future oriented in conversation and is olaf for safety, no prior suicide attempts, no family h/o suicide, no substance abuse, patient has social or family support, physically healthy, compliant with recommended medications. 2.  Psychiatric  ? Medications: Lamictal 200 mg daily, Lexapro 20 mg daily. R/B/SE/A reviewed with patient who consents to treatment. ? Labs: reviewed in Epic  ? Recommend outpt therapy. ? 12/7: Discussed purpose and doses of current medications, possible adjustment if warranted. Hernando Hankins states he will attempt to get outside records for review prior to any adjustments/changes. ? 12/31: Taper Lamictal to 100 mg daily for 1 week. Okay to cut tabs in half. Asked to reach out in approximately 1 to discuss further taper and to let me know if refill at lower dose is needed. Reviewed symptoms to monitor for during taper and Macho verbalized understanding. ? 01/28: Denies symptoms of josse with decrease of Lamictal but does verbalize concerns of headaches. Discussed this with Dr. Bronwyn Melvin who recommends following up with her. 3.  Substance Abuse   ? No active issues  4. Medical  ? Following with Kati Ann MD  5. Return in about 4 weeks (around 2/25/2021). Total time: 20 minutes spent with patient completing evaluation process and more than 50% of the time was spent completing evaluation and planning treatment with the patient. Gabi Walker, MPH, PMHNP-BC  01/28/21

## 2021-01-28 ENCOUNTER — VIRTUAL VISIT (OUTPATIENT)
Dept: PSYCHIATRY | Age: 26
End: 2021-01-28
Payer: MEDICAID

## 2021-01-28 DIAGNOSIS — F39 MOOD DISORDER (HCC): Primary | ICD-10-CM

## 2021-01-28 DIAGNOSIS — F41.9 ANXIETY: ICD-10-CM

## 2021-01-28 PROCEDURE — 99213 OFFICE O/P EST LOW 20 MIN: CPT | Performed by: NURSE PRACTITIONER

## 2021-01-28 RX ORDER — LAMOTRIGINE 200 MG/1
100 TABLET ORAL DAILY
Qty: 30 TABLET | Refills: 0
Start: 2021-01-28 | End: 2021-03-01 | Stop reason: DRUGHIGH

## 2021-03-01 ENCOUNTER — VIRTUAL VISIT (OUTPATIENT)
Dept: PSYCHIATRY | Age: 26
End: 2021-03-01
Payer: MEDICAID

## 2021-03-01 DIAGNOSIS — F39 MOOD DISORDER (HCC): Primary | ICD-10-CM

## 2021-03-01 DIAGNOSIS — F41.9 ANXIETY: ICD-10-CM

## 2021-03-01 PROCEDURE — 99213 OFFICE O/P EST LOW 20 MIN: CPT | Performed by: NURSE PRACTITIONER

## 2021-03-01 RX ORDER — LAMOTRIGINE 25 MG/1
50 TABLET ORAL DAILY
Qty: 42 TABLET | Refills: 0 | Status: SHIPPED
Start: 2021-03-01 | End: 2021-05-18 | Stop reason: ALTCHOICE

## 2021-03-01 ASSESSMENT — ENCOUNTER SYMPTOMS
CHEST TIGHTNESS: 0
SORE THROAT: 0
ABDOMINAL PAIN: 0
NAUSEA: 0
DIARRHEA: 0
SHORTNESS OF BREATH: 0
VOMITING: 0

## 2021-03-01 NOTE — PROGRESS NOTES
Outpatient Follow-Up Psychiatric Evaluation    Elena Lackey  : 1995     Pursuant to the emergency declaration under the [facility], [address] [place] [address] and the [employer] Act, this appointment was completed via virtual visit using Doxy. me to substitute for an in-person clinic visit. This visit was conducted with patient's consent to reduce the patient's risk of exposure to COVID-19 and provide continuity of care for an established patient. Verified the following information:  Patient's identification: Yes  Patient location: Home  Patient's call back number: [phone]  Provider location: [place], [place]     Chief Complaint: Depression, anxiety    History of Present Illness:  Context: Stress  Location: Mood, anxiety  Duration: Ongoing   Severity: Moderate  Associated Symptoms: Depression, irritability, anxiety, decreased motivation and interest in activities, difficulty concentrating, worry, feeling on edge    SUBJECTIVE/UPDATE:  Elena Lackey reports that he has been \"well enough\" since we last spoke. He had tapered the Lamictal to 100 mg and has been taking it at that dose. He denies any changes in mood swings, elevated mood, impulsivity. Denies periods of decreased need for sleep, noting he continues to sleep a lot. Endorses some irritability but is unsure if that is related to medication adjustment or other stressors present in his life currently. He notes some increased in productivity around his apartment but states this is impeded at times due to headaches and has been because he has been forcing himself to do things. Notes anxiety is difficult to gauge as he is not doing much. Did not hear back from the jobs he applied to previously and continues to apply. He does continue to endorse daily, constant headaches.  He notes he was experiencing these prior to the Lamictal adjustment and he is unsure if this contributed to them. He has an appointment with Dr. José Miguel Rizo tomorrow to discuss this with her. Review of Systems   Review of Systems   Constitutional: Negative for activity change, appetite change and unexpected weight change. HENT: Negative for congestion, sneezing and sore throat. Respiratory: Negative for chest tightness and shortness of breath. Cardiovascular: Negative for chest pain. Gastrointestinal: Negative for abdominal pain, diarrhea, nausea and vomiting. Musculoskeletal: Negative for arthralgias and myalgias. Neurological: Negative for dizziness, light-headedness and headaches. Psychiatric/Behavioral: Positive for decreased concentration, dysphoric mood and sleep disturbance. Negative for hallucinations and suicidal ideas. The patient is nervous/anxious. All other systems reviewed and are negative. Current Meds  Current Outpatient Medications on File Prior to Visit   Medication Sig Dispense Refill    tretinoin (RETIN-A) 0.025 % cream Apply topically nightly. 45 g 1    escitalopram (LEXAPRO) 20 MG tablet Take 1 tablet by mouth daily 30 tablet 5    aspirin-acetaminophen-caffeine (EXCEDRIN MIGRAINE) 250-250-65 MG per tablet Take 1 tablet by mouth every 6 hours as needed for Headaches 90 tablet 3     No current facility-administered medications on file prior to visit. History:  Past Medical History:   Diagnosis Date    Anxiety     Depression       History reviewed. No pertinent surgical history.   Family History   Problem Relation Age of Onset    Cancer Mother         melanoma    Hypertension Mother     Hypertension Father     Anxiety Disorder Father      Social History     Socioeconomic History    Marital status: Single     Spouse name: None    Number of children: None    Years of education: None    Highest education level: None   Occupational History    None   Social Needs    Financial resource strain: None    Food insecurity     Worry: None     Inability: None  Transportation needs     Medical: None     Non-medical: None   Tobacco Use    Smoking status: Never Smoker    Smokeless tobacco: Never Used   Substance and Sexual Activity    Alcohol use: Yes     Frequency: Monthly or less     Comment: Occasional, 2-3 x/wk, 1 mixed drink    Drug use: Never    Sexual activity: None   Lifestyle    Physical activity     Days per week: None     Minutes per session: None    Stress: None   Relationships    Social connections     Talks on phone: None     Gets together: None     Attends [employer] service: None     Active member of club or organization: None     Attends meetings of clubs or organizations: None     Relationship status: None    Intimate partner violence     Fear of current or ex partner: None     Emotionally abused: None     Physically abused: None     Forced sexual activity: None   Other Topics Concern    None   Social History Narrative    Finishing grad school    Moved to [place] in 2020.     2 cats. Not in a hospital admission. Allergies   Allergen Reactions    No Known Allergies       OBJECTIVE  Vital Signs: There were no vitals filed for this visit. Physical Exam:  Constitutional: No acute distress noted  HEENT: Atraumatic  Cardiovascular: No distress noted  Respiratory: No distress noted  Neurological: No cranial nerve abnormalities apparent  MS: No abnormalities apparent  Gait: Observed via telehealth, gait deferred  Psychiatric: Please see below    Labs:  No results found for any previous visit.       Last Drug screen:   No results found for: Kaleigh Fus, LABBENZ, COCAIMETSCRU, THC, MDMA, LABMETH, OPIATESCREENURINE, OXTCOSU, PHENCYCLIDINESCREENURINE, PROPOXYPHENE, METAMPU    PSYCHIATRIC ASSESSMENT     Mental Status Examination:  Appearance: WM, appears stated age, wearing personal attire, good grooming and hygiene  Behavior/Attitude Toward Examiner: Cooperative, pleasant, attentive, fair eye contact  Speech: Spontaneous, normal rate, normal volume  Mood: \"Well enough\"  Affect: Mood congruent  Thought Processes: Logical, linear  Thought Content: No SI/HI voiced, no delusions voiced, no obsessions  Perceptions: No AVH voiced, did not appear to be RTIS  Attention: Intact  Abstraction: Intact  Cognition: Alert and oriented to person, place, time, and situation, recall intact  Insight: WNL   Judgment: WNL     Diagnoses  Encounter Diagnoses   Name Primary?  Mood disorder ([facility] [address] Yes    Anxiety       Plan  Reviewed nursing and ancillary staff notes. Evaluated medications and assessed for side effects and effectiveness. Assessed patient's educational needs including reviewing Plan of Care, medications and diagnosis. 1. Safety: NO Imminent risk of danger to/self/others based on the factors considered below. Appropriate for outpatient level of care. Safety plan includes: 911, PES, hotlines, and interventions discussed today. · Risk factors: Age <25 or >49, male gender, depressed mood, social isolation. · Protective factors: Denies current suicidal ideation/plan/intent, does not have access to guns or weapons, patient is future oriented in conversation and is olaf for safety, no prior suicide attempts, no family h/o suicide, no substance abuse, patient has social or family support, physically healthy, compliant with recommended medications. 2.  Psychiatric   Medications: Lamictal 200 mg daily, Lexapro 20 mg daily. R/B/SE/A reviewed with patient who consents to treatment.  Labs: reviewed in [place] [id number] therapy.  12/7: Discussed purpose and doses of current medications, possible adjustment if warranted. Carolyn Ho states he will attempt to get outside records for review prior to any adjustments/changes.  12/31: Taper Lamictal to 100 mg daily for 1 week. Okay to cut tabs in half. Asked to reach out in approximately 1 to discuss further taper and to let me know if refill at lower dose is needed.  Reviewed symptoms to monitor for during taper and Macho verbalized understanding.  01/28: Denies symptoms of josse with decrease of Lamictal but does verbalize concerns of headaches. Discussed this with Dr. Stephania Fajardo who recommends following up with her.  03/01: Denies s/s josse. Continue taper of Lamictal. Decrease to 50 mg for 3 weeks, then discontinue. Will discuss concerns of headaches with Dr. Stephania Fajardo at appointment tomorrow. 3.  Substance Abuse    No active issues  4. Medical   Following with Juan Pablo Botello MD  5. Return in about 4 weeks (around 3/29/2021). Total time: 20 minutes spent with patient completing evaluation process and more than 50% of the time was spent completing evaluation and planning treatment with the patient. Gabi Walker, MPH, PMHNP-BC  03/01/21

## 2021-03-02 ENCOUNTER — OFFICE VISIT (OUTPATIENT)
Dept: INTERNAL MEDICINE CLINIC | Age: 26
End: 2021-03-02
Payer: MEDICAID

## 2021-03-02 VITALS
TEMPERATURE: 98.5 F | BODY MASS INDEX: 34.44 KG/M2 | OXYGEN SATURATION: 95 % | HEART RATE: 85 BPM | RESPIRATION RATE: 14 BRPM | DIASTOLIC BLOOD PRESSURE: 102 MMHG | WEIGHT: 240 LBS | SYSTOLIC BLOOD PRESSURE: 152 MMHG

## 2021-03-02 DIAGNOSIS — R51.9 CHRONIC HEADACHE WITH NEW FEATURES: ICD-10-CM

## 2021-03-02 DIAGNOSIS — G89.29 CHRONIC HEADACHE WITH NEW FEATURES: ICD-10-CM

## 2021-03-02 DIAGNOSIS — R51.9 CHRONIC DAILY HEADACHE: Primary | ICD-10-CM

## 2021-03-02 PROCEDURE — 99214 OFFICE O/P EST MOD 30 MIN: CPT | Performed by: INTERNAL MEDICINE

## 2021-03-02 PROCEDURE — G8427 DOCREV CUR MEDS BY ELIG CLIN: HCPCS | Performed by: INTERNAL MEDICINE

## 2021-03-02 PROCEDURE — 1036F TOBACCO NON-USER: CPT | Performed by: INTERNAL MEDICINE

## 2021-03-02 PROCEDURE — G8417 CALC BMI ABV UP PARAM F/U: HCPCS | Performed by: INTERNAL MEDICINE

## 2021-03-02 PROCEDURE — G8482 FLU IMMUNIZE ORDER/ADMIN: HCPCS | Performed by: INTERNAL MEDICINE

## 2021-03-02 RX ORDER — ATENOLOL 25 MG/1
25 TABLET ORAL DAILY
Qty: 30 TABLET | Refills: 3 | Status: SHIPPED | OUTPATIENT
Start: 2021-03-02 | End: 2021-07-12

## 2021-03-02 ASSESSMENT — ENCOUNTER SYMPTOMS
VOMITING: 0
NAUSEA: 0

## 2021-03-02 NOTE — PROGRESS NOTES
Chiqui Bhandari (:  1995) is a 23 y.o. male,Established patient, here for evaluation of the following chief complaint(s):  Follow-up      ASSESSMENT/PLAN:  1. Chronic daily headache  Assessment & Plan: This has been an issue over the last year and has been worsening with new features. He has not had imaging. Will order MRI. Unclear headache type whether migrainous or tension type. Will start beta blocker for ppx and monitor over time. Using beta blocker because of increased blood pressure as well to try to get BP better controlled. Orders:  -     MRI BRAIN WO CONTRAST; Future  -     atenolol (TENORMIN) 25 MG tablet; Take 1 tablet by mouth daily, Disp-30 tablet, R-3Normal  2. Chronic headache with new features  -     MRI BRAIN WO CONTRAST; Future  -     atenolol (TENORMIN) 25 MG tablet; Take 1 tablet by mouth daily, Disp-30 tablet, R-3Normal      Return in about 6 weeks (around 2021) for headache/HTN f/u.    SUBJECTIVE/OBJECTIVE:  HPI    Headaches  3 weeks ago headaches started ramping up again. Headaches are a constant, dull throbbing. He is rarely without a headache. Ice packs help distract him some. They are worse at night or with heavier activity. Sometimes the headaches keep him from sleeping. Headaches are frontal and between the eyes. 1-6/10 in severity. Has not had vision changes. Has not had eye exam. Not positional. No nausea or vomiting. Over the last week longest period of time without headache has been 4-6 hours. Has been going on over the last 6-8 months. Has some congestion but this does not necessarily feel like a sinus headache. He initially thought was related to going down on lamictal dose but not sure now. Review of Systems   Constitutional: Positive for fatigue. Negative for chills and fever. Gastrointestinal: Negative for nausea and vomiting. Neurological: Positive for headaches.  Negative for dizziness, tremors, seizures, syncope, speech difficulty, weakness, light-headedness and numbness. Psychiatric/Behavioral: Positive for dysphoric mood. Negative for confusion, decreased concentration and sleep disturbance. The patient is nervous/anxious. Current Outpatient Medications on File Prior to Visit   Medication Sig Dispense Refill    lamoTRIgine (LAMICTAL) 25 MG tablet Take 2 tablets by mouth daily 42 tablet 0    tretinoin (RETIN-A) 0.025 % cream Apply topically nightly. 45 g 1    escitalopram (LEXAPRO) 20 MG tablet Take 1 tablet by mouth daily 30 tablet 5    aspirin-acetaminophen-caffeine (EXCEDRIN MIGRAINE) 250-250-65 MG per tablet Take 1 tablet by mouth every 6 hours as needed for Headaches 90 tablet 3     No current facility-administered medications on file prior to visit. Vitals:    03/02/21 1440   BP: (!) 152/102   Pulse: 85   Resp: 14   Temp: 98.5 °F (36.9 °C)   SpO2: 95%     Physical Exam  Constitutional:       Appearance: Normal appearance. Neurological:      General: No focal deficit present. Mental Status: He is alert and oriented to person, place, and time. Mental status is at baseline. Coordination: Coordination normal.      Gait: Gait normal.   Psychiatric:         Mood and Affect: Mood normal.         Behavior: Behavior normal.           On this date 3/2/2021 I have spent 30 minutes reviewing previous notes, test results and face to face with the patient discussing the diagnosis and importance of compliance with the treatment plan as well as documenting on the day of the visit. An electronic signature was used to authenticate this note.     --Claudia Temple MD

## 2021-03-02 NOTE — ASSESSMENT & PLAN NOTE
This has been an issue over the last year and has been worsening with new features. He has not had imaging. Will order MRI. Unclear headache type whether migrainous or tension type. Will start beta blocker for ppx and monitor over time. Using beta blocker because of increased blood pressure as well to try to get BP better controlled.

## 2021-03-18 ENCOUNTER — HOSPITAL ENCOUNTER (OUTPATIENT)
Dept: MRI IMAGING | Age: 26
Discharge: HOME | End: 2021-03-18
Payer: MEDICAID

## 2021-03-18 DIAGNOSIS — G89.29 CHRONIC HEADACHE WITH NEW FEATURES: ICD-10-CM

## 2021-03-18 DIAGNOSIS — R51.9 CHRONIC HEADACHE WITH NEW FEATURES: ICD-10-CM

## 2021-03-18 DIAGNOSIS — R51.9 CHRONIC DAILY HEADACHE: ICD-10-CM

## 2021-03-18 PROCEDURE — 70551 MRI BRAIN STEM W/O DYE: CPT

## 2021-03-25 DIAGNOSIS — J30.81 ALLERGIC RHINITIS DUE TO ANIMAL HAIR AND DANDER: Primary | ICD-10-CM

## 2021-03-25 RX ORDER — FLUTICASONE PROPIONATE 50 MCG
2 SPRAY, SUSPENSION (ML) NASAL DAILY
Qty: 1 BOTTLE | Refills: 5 | Status: SHIPPED | OUTPATIENT
Start: 2021-03-25

## 2021-03-29 ASSESSMENT — ENCOUNTER SYMPTOMS
DIARRHEA: 0
SORE THROAT: 0
NAUSEA: 0
SHORTNESS OF BREATH: 0
ABDOMINAL PAIN: 0
CHEST TIGHTNESS: 0
VOMITING: 0

## 2021-03-29 NOTE — PROGRESS NOTES
Outpatient Follow-Up Psychiatric Evaluation    Dioni Colindres  : 1995     Pursuant to the emergency declaration under the [facility], [address] [place] [address] and the [employer] Act, this appointment was completed via virtual visit using Doxy. me to substitute for an in-person clinic visit. This visit was conducted with patient's consent to reduce the patient's risk of exposure to COVID-19 and provide continuity of care for an established patient. Verified the following information:  Patient's identification: Yes  Patient location: Home  Patient's call back number: [phone]  Provider location: [place], [place]     Chief Complaint: Depression, anxiety    History of Present Illness:  Context: Stress  Location: Mood, anxiety  Duration: Ongoing   Severity: Moderate  Associated Symptoms: Depression, irritability, anxiety, decreased motivation and interest in activities, difficulty concentrating, worry, feeling on edge    SUBJECTIVE/UPDATE:  Dioni Colindres reports things have been \"okay\" recently. As noted in my previous documentation, based upon his reported symptoms and previous records I reviewed, I suspect that bipolar disorder is a misdiagnosis. Michael Martinez notes he is still in the process of tapering Lamictal as he picked up the decreased dose from the pharmacy after our last appointment. He denies s/s of hypo/josse with the decrease of Lamictal.    Continues to endorse feelings of depression, continues to have lack of interest, lack of energy, no motivation to get things done, \"I don't feel like I have a reason to get things done\". He denies suicidal ideations. Anxiety is \"about the same\", notes increased recently as he had a MRI for his headaches. Feels his headaches contribute to his depression levels. He has been on Lexapro 20 mg for approximately two years and he is unsure as to whether or not it is helpful.  Has his first COVID vaccine scheduled for later today. Review of Systems   Review of Systems   Constitutional: Negative for activity change, appetite change and unexpected weight change. HENT: Negative for congestion, sneezing and sore throat. Respiratory: Negative for chest tightness and shortness of breath. Cardiovascular: Negative for chest pain. Gastrointestinal: Negative for abdominal pain, diarrhea, nausea and vomiting. Musculoskeletal: Negative for arthralgias and myalgias. Neurological: Negative for dizziness, light-headedness and headaches. Psychiatric/Behavioral: Positive for decreased concentration, dysphoric mood and sleep disturbance. Negative for hallucinations and suicidal ideas. The patient is nervous/anxious. All other systems reviewed and are negative. Current Meds  Current Outpatient Medications on File Prior to Visit   Medication Sig Dispense Refill    fluticasone (FLONASE) 50 MCG/ACT nasal spray 2 sprays by Each Nostril route daily 1 Bottle 5    atenolol (TENORMIN) 25 MG tablet Take 1 tablet by mouth daily 30 tablet 3    lamoTRIgine (LAMICTAL) 25 MG tablet Take 2 tablets by mouth daily 42 tablet 0    tretinoin (RETIN-A) 0.025 % cream Apply topically nightly. 45 g 1    escitalopram (LEXAPRO) 20 MG tablet Take 1 tablet by mouth daily 30 tablet 5    aspirin-acetaminophen-caffeine (EXCEDRIN MIGRAINE) 250-250-65 MG per tablet Take 1 tablet by mouth every 6 hours as needed for Headaches 90 tablet 3     No current facility-administered medications on file prior to visit. History:  Past Medical History:   Diagnosis Date    Anxiety     Depression       History reviewed. No pertinent surgical history.   Family History   Problem Relation Age of Onset   [facility] Cancer Mother         melanoma    Hypertension Mother     Hypertension Father     Anxiety Disorder Father      Social History     Socioeconomic History    Marital status: Single     Spouse name: None    Number of children: None  Years of education: None    Highest education level: None   Occupational History    None   Social Needs    Financial resource strain: None    Food insecurity     Worry: None     Inability: None    Transportation needs     Medical: None     Non-medical: None   Tobacco Use    Smoking status: Never Smoker    Smokeless tobacco: Never Used   Substance and Sexual Activity    Alcohol use: Yes     Frequency: Monthly or less     Comment: Occasional, 2-3 x/wk, 1 mixed drink    Drug use: Never    Sexual activity: None   Lifestyle    Physical activity     Days per week: None     Minutes per session: None    Stress: None   Relationships    Social connections     Talks on phone: None     Gets together: None     Attends Spiritism service: None     Active member of club or organization: None     Attends meetings of clubs or organizations: None     Relationship status: None    Intimate partner violence     Fear of current or ex partner: None     Emotionally abused: None     Physically abused: None     Forced sexual activity: None   Other Topics Concern    None   Social History Narrative    Finishing grad school    Moved to [place] in 2020.     2 cats. Not in a hospital admission. Allergies   Allergen Reactions    No Known Allergies       OBJECTIVE  Vital Signs: There were no vitals filed for this visit. Physical Exam:  Constitutional: No acute distress noted  HEENT: Atraumatic  Cardiovascular: No distress noted  Respiratory: No distress noted  Neurological: No cranial nerve abnormalities apparent  MS: No abnormalities apparent  Gait: Observed via telehealth, gait deferred  Psychiatric: Please see below    Labs:  No visits with results within 6 Month(s) from this visit. Latest known visit with results is:   No results found for any previous visit.       Last Drug screen:   No results found for: Blease Shingles, LABBENZ, COCAIMETSCRU, THC, MDMA, LABMETH, OPIATESCREENURINE, OXTCOSU, [facility] [address]    PSYCHIATRIC ASSESSMENT     Mental Status Examination:  Appearance: NANCY - patient did not activate video during telehealth appointment  Behavior/Attitude Toward Examiner: Cooperative, pleasant, attentive  Speech: Spontaneous, normal rate, normal volume  Mood: \"Okay\"  Affect: NANCY - patient did not activate video during telehealth appointment  Thought Processes: Logical, linear  Thought Content: Denies SI, no HI voiced, no delusions voiced, no obsessions  Perceptions: No AVH voiced, did not appear to be RTIS   Attention: Intact  Abstraction: Intact  Cognition: Alert and oriented to person, place, time, and situation, recall intact  Insight: WNL   Judgment: WNL     Diagnoses  Encounter Diagnoses   Name Primary?  Mood disorder ([facility] [address] Yes    Anxiety       Plan  Reviewed nursing and ancillary staff notes. Evaluated medications and assessed for side effects and effectiveness. Assessed patient's educational needs including reviewing Plan of Care, medications and diagnosis. 1. Safety: NO Imminent risk of danger to/self/others based on the factors considered below. Appropriate for outpatient level of care. Safety plan includes: 911, PES, hotlines, and interventions discussed today. · Risk factors: Age <25 or >49, male gender, depressed mood, social isolation. · Protective factors: Denies current suicidal ideation/plan/intent, does not have access to guns or weapons, patient is future oriented in conversation and is olaf for safety, no prior suicide attempts, no family h/o suicide, no substance abuse, patient has social or family support, physically healthy, compliant with recommended medications. 2.  Psychiatric   Medications: Lamictal 200 mg daily, Lexapro 20 mg daily. R/B/SE/A reviewed with patient who consents to treatment.  Labs: reviewed in [place]  therapy.     12/7: Discussed purpose and doses of current medications, possible adjustment if warranted. Eliud Alejandro states he will attempt to get outside records for review prior to any adjustments/changes.  12/31: Taper Lamictal to 100 mg daily for 1 week. Okay to cut tabs in half. Asked to reach out in approximately 1 to discuss further taper and to let me know if refill at lower dose is needed. Reviewed symptoms to monitor for during taper and Macho verbalized understanding.  01/28: Denies symptoms of josse with decrease of Lamictal but does verbalize concerns of headaches. Discussed this with Dr. Ericka Grant who recommends following up with her.  03/01: Denies s/s josse. Continue taper of Lamictal. Decrease to 50 mg for 3 weeks, then discontinue. Will discuss concerns of headaches with Dr. Ericka Grant at appointment tomorrow.  03/30: Continue taper of Lamictal. Once taper is finished, will trial Bupropion 75 mg for mood, energy, motivation (lower dose to begin with to make sure it does not worsen headaches). Although I suspect bipolar diagnosis was inaccurate, will continue to monitor for s/s hypo/josse. 3.  Substance Abuse    No active issues  4. Medical   Following with Kenneth Thao MD  5. Return in about 5 weeks (around 5/4/2021). Total time: 20 minutes spent with patient completing evaluation process and more than 50% of the time was spent completing evaluation and planning treatment with the patient. Gabi Walker, MPH, PMHNP-BC  03/30/21

## 2021-03-30 ENCOUNTER — VIRTUAL VISIT (OUTPATIENT)
Dept: PSYCHIATRY | Age: 26
End: 2021-03-30
Payer: MEDICAID

## 2021-03-30 DIAGNOSIS — F39 MOOD DISORDER (HCC): Primary | ICD-10-CM

## 2021-03-30 DIAGNOSIS — F41.9 ANXIETY: ICD-10-CM

## 2021-03-30 PROCEDURE — 99213 OFFICE O/P EST LOW 20 MIN: CPT | Performed by: NURSE PRACTITIONER

## 2021-03-30 RX ORDER — BUPROPION HYDROCHLORIDE 75 MG/1
75 TABLET ORAL DAILY
Qty: 30 TABLET | Refills: 1 | Status: SHIPPED
Start: 2021-03-30 | End: 2021-05-20 | Stop reason: DRUGHIGH

## 2021-05-18 ENCOUNTER — OFFICE VISIT (OUTPATIENT)
Dept: INTERNAL MEDICINE CLINIC | Age: 26
End: 2021-05-18
Payer: MEDICAID

## 2021-05-18 VITALS
DIASTOLIC BLOOD PRESSURE: 86 MMHG | SYSTOLIC BLOOD PRESSURE: 146 MMHG | BODY MASS INDEX: 35.15 KG/M2 | WEIGHT: 245 LBS | RESPIRATION RATE: 14 BRPM | OXYGEN SATURATION: 96 % | HEART RATE: 64 BPM

## 2021-05-18 DIAGNOSIS — Z83.3 FAMILY HISTORY OF DIABETES MELLITUS: ICD-10-CM

## 2021-05-18 DIAGNOSIS — Z13.1 SCREENING FOR DIABETES MELLITUS (DM): ICD-10-CM

## 2021-05-18 DIAGNOSIS — F39 MOOD DISORDER (HCC): ICD-10-CM

## 2021-05-18 DIAGNOSIS — I10 ESSENTIAL HYPERTENSION: ICD-10-CM

## 2021-05-18 DIAGNOSIS — Z13.220 ENCOUNTER FOR LIPID SCREENING FOR CARDIOVASCULAR DISEASE: ICD-10-CM

## 2021-05-18 DIAGNOSIS — Z11.59 ENCOUNTER FOR HEPATITIS C SCREENING TEST FOR LOW RISK PATIENT: ICD-10-CM

## 2021-05-18 DIAGNOSIS — H74.90 DISORDER OF MASTOID, UNSPECIFIED LATERALITY: ICD-10-CM

## 2021-05-18 DIAGNOSIS — Z13.6 ENCOUNTER FOR LIPID SCREENING FOR CARDIOVASCULAR DISEASE: ICD-10-CM

## 2021-05-18 DIAGNOSIS — M54.9 MID BACK PAIN, CHRONIC: Primary | ICD-10-CM

## 2021-05-18 DIAGNOSIS — G89.29 MID BACK PAIN, CHRONIC: Primary | ICD-10-CM

## 2021-05-18 DIAGNOSIS — R51.9 CHRONIC DAILY HEADACHE: ICD-10-CM

## 2021-05-18 DIAGNOSIS — J34.1 MUCOUS RETENTION CYST OF MAXILLARY SINUS: ICD-10-CM

## 2021-05-18 DIAGNOSIS — Z11.4 ENCOUNTER FOR SCREENING FOR HIV: ICD-10-CM

## 2021-05-18 PROCEDURE — G8427 DOCREV CUR MEDS BY ELIG CLIN: HCPCS | Performed by: INTERNAL MEDICINE

## 2021-05-18 PROCEDURE — G8417 CALC BMI ABV UP PARAM F/U: HCPCS | Performed by: INTERNAL MEDICINE

## 2021-05-18 PROCEDURE — 99214 OFFICE O/P EST MOD 30 MIN: CPT | Performed by: INTERNAL MEDICINE

## 2021-05-18 PROCEDURE — 1036F TOBACCO NON-USER: CPT | Performed by: INTERNAL MEDICINE

## 2021-05-18 RX ORDER — LISINOPRIL 10 MG/1
10 TABLET ORAL DAILY
Qty: 30 TABLET | Refills: 5 | Status: SHIPPED | OUTPATIENT
Start: 2021-05-18 | End: 2021-12-03

## 2021-05-18 ASSESSMENT — PATIENT HEALTH QUESTIONNAIRE - PHQ9
7. TROUBLE CONCENTRATING ON THINGS, SUCH AS READING THE NEWSPAPER OR WATCHING TELEVISION: 2
2. FEELING DOWN, DEPRESSED OR HOPELESS: 2
1. LITTLE INTEREST OR PLEASURE IN DOING THINGS: 3
SUM OF ALL RESPONSES TO PHQ QUESTIONS 1-9: 17
SUM OF ALL RESPONSES TO PHQ QUESTIONS 1-9: 17
8. MOVING OR SPEAKING SO SLOWLY THAT OTHER PEOPLE COULD HAVE NOTICED. OR THE OPPOSITE, BEING SO FIGETY OR RESTLESS THAT YOU HAVE BEEN MOVING AROUND A LOT MORE THAN USUAL: 0

## 2021-05-18 ASSESSMENT — COLUMBIA-SUICIDE SEVERITY RATING SCALE - C-SSRS
1. WITHIN THE PAST MONTH, HAVE YOU WISHED YOU WERE DEAD OR WISHED YOU COULD GO TO SLEEP AND NOT WAKE UP?: NO
6. HAVE YOU EVER DONE ANYTHING, STARTED TO DO ANYTHING, OR PREPARED TO DO ANYTHING TO END YOUR LIFE?: NO

## 2021-05-18 NOTE — ASSESSMENT & PLAN NOTE
BP not at goal on atenolol 25mg daily. May be contributing to fatigue. Unclear. Pulse in 60s. Will add lisinopril 10mg daily and titrate as able. Close follow up.

## 2021-05-18 NOTE — PROGRESS NOTES
retention cyst of maxillary sinus  Assessment & Plan:  Seen on MRI. NO active infection. Treating with flonase. Will refer to ENT. Orders:  -     Liza Rangel DO, Otolaryngology, [facility]  11. Disorder of mastoid, unspecified laterality  -     Shantelle Melody Amaya, , Otolaryngology, [facility]      No follow-ups on file. SUBJECTIVE/OBJECTIVE:  HPI    Headaches have been better, more manageable. Started atenolol 2 months ago. Happy with the med. Mid back pain has been bothering more. Tightness. Not radiating. Does not wake up from sleep. When gets back pain - lays down. Notices it more in the evening. Tries to stretch. Bothers most days. Has never done any physical therapy. Sometimes tries OTC pain medications. MRI showed mastoid fluid and maxillary retention cyst. Using flonase. Review of Systems   Constitutional: Positive for fatigue. Neurological: Positive for headaches. Psychiatric/Behavioral: Positive for dysphoric mood. Negative for sleep disturbance. Current Outpatient Medications on File Prior to Visit   Medication Sig Dispense Refill    buPROPion (WELLBUTRIN) 75 MG tablet Take 1 tablet by mouth daily 30 tablet 1    fluticasone (FLONASE) 50 MCG/ACT nasal spray 2 sprays by Each Nostril route daily 1 Bottle 5    atenolol (TENORMIN) 25 MG tablet Take 1 tablet by mouth daily 30 tablet 3    tretinoin (RETIN-A) 0.025 % cream Apply topically nightly. 45 g 1    escitalopram (LEXAPRO) 20 MG tablet Take 1 tablet by mouth daily 30 tablet 5    aspirin-acetaminophen-caffeine (EXCEDRIN MIGRAINE) 250-250-65 MG per tablet Take 1 tablet by mouth every 6 hours as needed for Headaches 90 tablet 3     No current facility-administered medications on file prior to visit. Vitals:    05/18/21 0831   BP: (!) 146/86   Pulse:    Resp:    SpO2:      Physical Exam  Constitutional:       General: He is not in acute distress. Appearance: Normal appearance.    HENT: Head: Normocephalic and atraumatic. Right Ear: External ear normal.      Left Ear: External ear normal.      Nose: Nose normal.      Mouth/Throat:      Mouth: Mucous membranes are moist.      Pharynx: Oropharynx is clear. Eyes:      General: No scleral icterus. Conjunctiva/sclera: Conjunctivae normal.   Cardiovascular:      Rate and Rhythm: Normal rate and regular rhythm. Pulses: Normal pulses. Heart sounds: Normal heart sounds. No murmur heard. No friction rub. No gallop. Pulmonary:      Effort: Pulmonary effort is normal.      Breath sounds: Normal breath sounds. No wheezing, rhonchi or rales. Abdominal:      General: Abdomen is flat. Bowel sounds are normal.      Palpations: Abdomen is soft. Musculoskeletal:         General: Normal range of motion. Cervical back: Normal range of motion and neck supple. Right lower leg: No edema. Left lower leg: No edema. Skin:     General: Skin is warm and dry. Findings: No rash. Neurological:      General: No focal deficit present. Mental Status: He is alert. Mental status is at baseline. Coordination: Coordination normal.      Gait: Gait normal.   Psychiatric:         Mood and Affect: Mood normal.         Behavior: Behavior normal.                 An electronic signature was used to authenticate this note.     --Beti Donald MD

## 2021-05-20 ENCOUNTER — VIRTUAL VISIT (OUTPATIENT)
Dept: PSYCHIATRY | Age: 26
End: 2021-05-20
Payer: MEDICAID

## 2021-05-20 DIAGNOSIS — F39 MOOD DISORDER (HCC): Primary | ICD-10-CM

## 2021-05-20 DIAGNOSIS — F41.9 ANXIETY: ICD-10-CM

## 2021-05-20 PROCEDURE — 99212 OFFICE O/P EST SF 10 MIN: CPT | Performed by: NURSE PRACTITIONER

## 2021-05-20 RX ORDER — BUPROPION HYDROCHLORIDE 150 MG/1
150 TABLET ORAL EVERY MORNING
Qty: 30 TABLET | Refills: 1 | Status: SHIPPED | OUTPATIENT
Start: 2021-05-20 | End: 2021-07-27

## 2021-05-20 ASSESSMENT — ENCOUNTER SYMPTOMS
SORE THROAT: 0
DIARRHEA: 0
ABDOMINAL PAIN: 0
SHORTNESS OF BREATH: 0
NAUSEA: 0
VOMITING: 0
CHEST TIGHTNESS: 0

## 2021-05-20 NOTE — PROGRESS NOTES
Outpatient Follow-Up Psychiatric Evaluation    Fausto Barry  : 1995     Pursuant to the emergency declaration under the [facility], [address] [place] [address] and the [employer] Act, this appointment was completed via virtual visit using Doxy. me to substitute for an in-person clinic visit. This visit was conducted with patient's consent to reduce the patient's risk of exposure to COVID-19 and provide continuity of care for an established patient. Verified the following information:  Patient's identification: Yes  Patient location: Home  Patient's call back number: [phone]  Provider location: [place], [place]     Chief Complaint: Depression, anxiety    History of Present Illness:  Context: Stress  Location: Mood, anxiety  Duration: Ongoing   Severity: Moderate  Associated Symptoms: Depression, irritability, anxiety, decreased motivation and interest in activities, difficulty concentrating, worry, feeling on edge    SUBJECTIVE/UPDATE:  Fausto Barry reports things have been \"alright\" sine we last met. He is completely off the Lamictal (has been off for approximately 2 week) and denies issues with mood swings, elevated mood, decreased need for sleep. It is suspected that his previous bipolar diagnosis was not accurate but we will continue to monitor for this. He endorses depression, \"feeling very low\", decreased energy and motivation, low interest in things. He denies SI/plan/intent. Anxiety is \"about the same\", worries a lot, some physical symptoms. Does note HAs have gotten better. He is currently on Lexapro 20 mg daily and was started on Wellbutrin 75 mg daily (iniated low dose to ensure it was not going to worsen headaches). Discussed s/s of SS as we adjust antidepressant medications and he verbalized understanding.      Review of Systems   Review of Systems   Constitutional: Negative for activity change, appetite change and unexpected weight change. HENT: Negative for congestion, sneezing and sore throat. Respiratory: Negative for chest tightness and shortness of breath. Cardiovascular: Negative for chest pain. Gastrointestinal: Negative for abdominal pain, diarrhea, nausea and vomiting. Musculoskeletal: Negative for arthralgias and myalgias. Neurological: Negative for dizziness, light-headedness and headaches. Psychiatric/Behavioral: Positive for decreased concentration, dysphoric mood and sleep disturbance. Negative for hallucinations and suicidal ideas. The patient is nervous/anxious. All other systems reviewed and are negative. Current Meds  Current Outpatient Medications on File Prior to Visit   Medication Sig Dispense Refill    lisinopril (PRINIVIL;ZESTRIL) 10 MG tablet Take 1 tablet by mouth daily 30 tablet 5    fluticasone (FLONASE) 50 MCG/ACT nasal spray 2 sprays by Each Nostril route daily 1 Bottle 5    atenolol (TENORMIN) 25 MG tablet Take 1 tablet by mouth daily 30 tablet 3    tretinoin (RETIN-A) 0.025 % cream Apply topically nightly. 45 g 1    escitalopram (LEXAPRO) 20 MG tablet Take 1 tablet by mouth daily 30 tablet 5    aspirin-acetaminophen-caffeine (EXCEDRIN MIGRAINE) 250-250-65 MG per tablet Take 1 tablet by mouth every 6 hours as needed for Headaches 90 tablet 3     No current facility-administered medications on file prior to visit. History:  Past Medical History:   Diagnosis Date    Anxiety     Depression       History reviewed. No pertinent surgical history.   Family History   Problem Relation Age of Onset    Cancer Mother         melanoma    Hypertension Mother     Diabetes Mother     Hypertension Father     Anxiety Disorder Father     Diabetes Father      Social History     Socioeconomic History    Marital status: Single     Spouse name: None    Number of children: None    Years of education: None    Highest education level: None   Occupational History    None   Tobacco Use    Smoking status: Never Smoker    Smokeless tobacco: Never Used   Substance and Sexual Activity    Alcohol use: Yes     Comment: Occasional, 2-3 x/wk, 1 mixed drink    Drug use: Never    Sexual activity: None   Other Topics Concern    None   Social History Narrative    Finishing grad school    Moved to [place] in 2020.     2 cats. Social Determinants of Health     Financial Resource Strain:     Difficulty of Paying Living Expenses:    Food Insecurity:     Worried About Running Out of Food in the Last Year:     [address] in the Last Year:    Transportation Needs:     Lack of Transportation (Medical):  Lack of Transportation (Non-Medical):    Physical Activity:     Days of Exercise per Week:     Minutes of Exercise per Session:    Stress:     Feeling of Stress :    Social Connections:     Frequency of Communication with Friends and Family:     Frequency of Social Gatherings with Friends and Family:     Attends [employer] Services:     Active Member of Clubs or Organizations:     Attends Club or Organization Meetings:     Marital Status:    Intimate Partner Violence:     Fear of Current or Ex-Partner:     Emotionally Abused:     Physically Abused:     Sexually Abused:      Not in a hospital admission. Allergies   Allergen Reactions    No Known Allergies       OBJECTIVE  Vital Signs: There were no vitals filed for this visit. Physical Exam:  Constitutional: No acute distress noted  HEENT: Atraumatic  Cardiovascular: No distress noted  Respiratory: No distress noted  Neurological: No cranial nerve abnormalities apparent  MS: No abnormalities apparent  Gait: Observed via telehealth, gait deferred  Psychiatric: Please see below    Labs:  No visits with results within 6 Month(s) from this visit. Latest known visit with results is:   No results found for any previous visit.       Last Drug screen:   No results found for: [facility] COCAIMETSCRU, THC, MDMA, LABMETH, OPIATESCREENURINE, OXTCOSU, PHENCYCLIDINESCREENURINE, PROPOXYPHENE, METAMPU    PSYCHIATRIC ASSESSMENT     Mental Status Examination:  Appearance: NANCY - patient did not activate video during telehealth appointment  Behavior/Attitude Toward Examiner: Cooperative, pleasant, attentive  Speech: Spontaneous, normal rate, normal volume  Mood: \"Alright\"  Affect: NANCY - patient did not activate video during telehealth appointment  Thought Processes: Logical, linear  Thought Content: Denies SI, no HI voiced, no delusions voiced, no obsessions  Perceptions: No AVH voiced, did not appear to be RTIS   Attention: Intact  Abstraction: Intact  Cognition: Alert and oriented to person, place, time, and situation, recall intact  Insight: WNL   Judgment: WNL     Diagnoses  Encounter Diagnoses   Name Primary?  Mood disorder ([facility] [address] Yes    Anxiety       Plan  Reviewed nursing and ancillary staff notes. Evaluated medications and assessed for side effects and effectiveness. Assessed patient's educational needs including reviewing Plan of Care, medications and diagnosis. 1. Safety: NO Imminent risk of danger to/self/others based on the factors considered below. Appropriate for outpatient level of care. Safety plan includes: 911, PES, hotlines, and interventions discussed today. · Risk factors: Age <25 or >49, male gender, depressed mood, social isolation. · Protective factors: Denies current suicidal ideation/plan/intent, does not have access to guns or weapons, patient is future oriented in conversation and is olaf for safety, no prior suicide attempts, no family h/o suicide, no substance abuse, patient has social or family support, physically healthy, compliant with recommended medications. 2.  Psychiatric   Medications: Lamictal 200 mg daily, Lexapro 20 mg daily. R/B/SE/A reviewed with patient who consents to treatment.  Labs: reviewed in [facility] therapy.     12/7: Discussed purpose and doses of current medications, possible adjustment if warranted. Naina Cervantes states he will attempt to get outside records for review prior to any adjustments/changes.  12/31: Taper Lamictal to 100 mg daily for 1 week. Okay to cut tabs in half. Asked to reach out in approximately 1 to discuss further taper and to let me know if refill at lower dose is needed. Reviewed symptoms to monitor for during taper and Macho verbalized understanding.  01/28: Denies symptoms of josse with decrease of Lamictal but does verbalize concerns of headaches. Discussed this with Dr. Dominique Edouard who recommends following up with her.  03/01: Denies s/s josse. Continue taper of Lamictal. Decrease to 50 mg for 3 weeks, then discontinue. Will discuss concerns of headaches with Dr. Dominique Edouard at appointment tomorrow.  03/30: Continue taper of Lamictal. Once taper is finished, will trial Bupropion 75 mg for mood, energy, motivation (lower dose to begin with to make sure it does not worsen headaches). Although I suspect bipolar diagnosis was inaccurate, will continue to monitor for s/s hypo/josse.  05/20: Denies s/s of hypomania or josse now that he has been off Lamictal for 2 weeks and is agreeable to let me know if symptoms emerge, although I suspect this was an inaccurate diagnosis. Will increase Wellbutrin to  mg daily; continue Lexapro unchanged. Will send list of referrals via My Chart for review. 3.  Substance Abuse    No active issues  4. Medical   Following with Bob Hawthorne MD  5. Return in about 4 weeks (around 6/17/2021). Total time: 15 minutes spent with patient completing evaluation process and more than 50% of the time was spent completing evaluation and planning treatment with the patient. Gabi Walker, MPH, PMHNP-BC  05/20/21

## 2021-06-01 RX ORDER — ESCITALOPRAM OXALATE 20 MG/1
TABLET ORAL
Qty: 30 TABLET | Refills: 4 | Status: SHIPPED | OUTPATIENT
Start: 2021-06-01 | End: 2021-08-27

## 2021-06-17 ENCOUNTER — VIRTUAL VISIT (OUTPATIENT)
Dept: PSYCHIATRY | Age: 26
End: 2021-06-17
Payer: MEDICAID

## 2021-06-17 DIAGNOSIS — F39 MOOD DISORDER (HCC): Primary | ICD-10-CM

## 2021-06-17 PROCEDURE — 99212 OFFICE O/P EST SF 10 MIN: CPT | Performed by: NURSE PRACTITIONER

## 2021-06-17 ASSESSMENT — ENCOUNTER SYMPTOMS
SORE THROAT: 0
VOMITING: 0
SHORTNESS OF BREATH: 0
NAUSEA: 0
CHEST TIGHTNESS: 0
DIARRHEA: 0
ABDOMINAL PAIN: 0

## 2021-06-17 NOTE — PROGRESS NOTES
Outpatient Follow-Up Psychiatric Evaluation    Xochilt Fleming  : 1995     Pursuant to the emergency declaration under the [facility], [address] [place] [address] and the [employer] Act, this appointment was completed via virtual visit using Doxy. me to substitute for an in-person clinic visit. This visit was conducted with patient's consent to reduce the patient's risk of exposure to COVID-19 and provide continuity of care for an established patient. Verified the following information:  Patient's identification: Yes  Patient location: Home  Patient's call back number: [phone]  Provider location: [place], [place]     Chief Complaint: Depression, anxiety    History of Present Illness:  Context: Stress  Location: Mood, anxiety  Duration: Ongoing   Severity: Moderate  Associated Symptoms: Depression, irritability, anxiety, decreased motivation and interest in activities, difficulty concentrating, worry, feeling on edge    SUBJECTIVE/UPDATE:  Xochilt Fleming reports that his mood is \"a bit better\"; he has been having less frequent headaches which was a contributing factor to his poor mood. He has been off the Lamictal for approximately 6 weeks and denies s/s of josse. Continues to endorse anxiety, noting that anything that gives him a feeling of anticipation or \"any type of doubt\" worsens its severity. When he does experience this, he has feelings of tenseness, palpitations. He notes that it is manageable when it does happen but it may discourage him from going out and doing something. His anxiety does not impact ADLs. He has been looking for a long-term mental health provider for further medication management but has been having difficulty finding someone who takes his insurance and has availability. Discussed that I am able to bridge until he gets established and I sent an additional list of referrals via My Chart.      Review of Systems   Review of Systems   Constitutional: Negative for activity change, appetite change and unexpected weight change. HENT: Negative for congestion, sneezing and sore throat. Respiratory: Negative for chest tightness and shortness of breath. Cardiovascular: Negative for chest pain. Gastrointestinal: Negative for abdominal pain, diarrhea, nausea and vomiting. Musculoskeletal: Negative for arthralgias and myalgias. Neurological: Negative for dizziness, light-headedness and headaches. Psychiatric/Behavioral: Positive for decreased concentration and sleep disturbance. Negative for hallucinations and suicidal ideas. The patient is nervous/anxious. Notes mood is \"a bit better\"   All other systems reviewed and are negative. Current Meds  Current Outpatient Medications on File Prior to Visit   Medication Sig Dispense Refill    escitalopram (LEXAPRO) 20 MG tablet TAKE ONE TABLET BY MOUTH DAILY 30 tablet 4    buPROPion (WELLBUTRIN XL) 150 MG extended release tablet Take 1 tablet by mouth every morning 30 tablet 1    lisinopril (PRINIVIL;ZESTRIL) 10 MG tablet Take 1 tablet by mouth daily 30 tablet 5    fluticasone (FLONASE) 50 MCG/ACT nasal spray 2 sprays by Each Nostril route daily 1 Bottle 5    atenolol (TENORMIN) 25 MG tablet Take 1 tablet by mouth daily 30 tablet 3    tretinoin (RETIN-A) 0.025 % cream Apply topically nightly. 45 g 1    aspirin-acetaminophen-caffeine (EXCEDRIN MIGRAINE) 081-487-45 MG per tablet Take 1 tablet by mouth every 6 hours as needed for Headaches 90 tablet 3     No current facility-administered medications on file prior to visit. History:  Past Medical History:   Diagnosis Date    Anxiety     Depression       History reviewed. No pertinent surgical history.   Family History   Problem Relation Age of Onset   [facility] Cancer Mother         melanoma    Hypertension Mother     Diabetes Mother     Hypertension Father     Anxiety Disorder Father     Diabetes Father Social History     Socioeconomic History    Marital status: Single     Spouse name: None    Number of children: None    Years of education: None    Highest education level: None   Occupational History    None   Tobacco Use    Smoking status: Never Smoker    Smokeless tobacco: Never Used   Substance and Sexual Activity    Alcohol use: Yes     Comment: Occasional, 2-3 x/wk, 1 mixed drink    Drug use: Never    Sexual activity: None   Other Topics Concern    None   Social History Narrative    Finishing grad school    Moved to [place] in 2020.     2 cats. Social Determinants of Health     Financial Resource Strain:     Difficulty of Paying Living Expenses:    Food Insecurity:     Worried About Running Out of Food in the Last Year:     [address] in the Last Year:    Transportation Needs:     Lack of Transportation (Medical):  Lack of Transportation (Non-Medical):    Physical Activity:     Days of Exercise per Week:     Minutes of Exercise per Session:    Stress:     Feeling of Stress :    Social Connections:     Frequency of Communication with Friends and Family:     Frequency of Social Gatherings with Friends and Family:     Attends [employer] Services:     Active Member of Clubs or Organizations:     Attends Club or Organization Meetings:     Marital Status:    Intimate Partner Violence:     Fear of Current or Ex-Partner:     Emotionally Abused:     Physically Abused:     Sexually Abused:      Not in a hospital admission. Allergies   Allergen Reactions    No Known Allergies       OBJECTIVE  Vital Signs: There were no vitals filed for this visit.     Physical Exam:  Constitutional: No acute distress noted  HEENT: Atraumatic  Cardiovascular: No distress noted  Respiratory: No distress noted  Neurological: No cranial nerve abnormalities apparent  MS: No abnormalities apparent  Gait: Observed via telehealth, gait deferred  Psychiatric: Please see below    Labs:  No visits with results within 6 Month(s) from this visit. Latest known visit with results is:   No results found for any previous visit. Last Drug screen:   No results found for: Ranjit Quijano, LABBENZ, [address], THC, MDMA, LABMETH, [address], OXTCOSU, [place], PROPOXYPHENE, METAMPU    PSYCHIATRIC ASSESSMENT     Mental Status Examination:  Appearance: NANCY - patient did not activate video during telehealth appointment   Behavior/Attitude Toward Examiner: Cooperative, pleasant, attentive  Speech: Spontaneous, normal rate, normal volume  Mood: \"A bit better\"  Affect: NANCY - patient did not activate video during telehealth appointment   Thought Processes: Logical, linear  Thought Content: Denies SI, no HI voiced, no delusions voiced, no obsessions   Perceptions: No AVH voiced, did not appear to be RTIS   Attention: Intact  Abstraction: Intact  Cognition: Alert and oriented to person, place, time, and situation, recall intact  Insight: WNL   Judgment: WNL     Diagnoses  Encounter Diagnosis   Name Primary?  [facility] Yes      Plan  Reviewed nursing and ancillary staff notes. Evaluated medications and assessed for side effects and effectiveness. Assessed patient's educational needs including reviewing Plan of Care, medications and diagnosis. 1. Safety: NO Imminent risk of danger to/self/others based on the factors considered below. Appropriate for outpatient level of care. Safety plan includes: 911, PES, hotlines, and interventions discussed today. · Risk factors: Age <25 or >49, male gender, depressed mood, social isolation. · Protective factors: Denies current suicidal ideation/plan/intent, does not have access to guns or weapons, patient is future oriented in conversation and is olaf for safety, no prior suicide attempts, no family h/o suicide, no substance abuse, patient has social or family support, physically healthy, compliant with recommended medications.   2. Psychiatric   Medications: Lamictal 200 mg daily, Lexapro 20 mg daily. R/B/SE/A reviewed with patient who consents to treatment.  Labs: reviewed in [facility]  therapy.  12/7: Discussed purpose and doses of current medications, possible adjustment if warranted. Roney Briseno states he will attempt to get outside records for review prior to any adjustments/changes.  12/31: Taper Lamictal to 100 mg daily for 1 week. Okay to cut tabs in half. Asked to reach out in approximately 1 to discuss further taper and to let me know if refill at lower dose is needed. Reviewed symptoms to monitor for during taper and Macho verbalized understanding.  01/28: Denies symptoms of josse with decrease of Lamictal but does verbalize concerns of headaches. Discussed this with Dr. Luis Alberto Choi who recommends following up with her.  03/01: Denies s/s josse. Continue taper of Lamictal. Decrease to 50 mg for 3 weeks, then discontinue. Will discuss concerns of headaches with Dr. Luis Alberto Choi at appointment tomorrow.  03/30: Continue taper of Lamictal. Once taper is finished, will trial Bupropion 75 mg for mood, energy, motivation (lower dose to begin with to make sure it does not worsen headaches). Although I suspect bipolar diagnosis was inaccurate, will continue to monitor for s/s hypo/josse.  05/20: Denies s/s of hypomania or josse now that he has been off Lamictal for 2 weeks and is agreeable to let me know if symptoms emerge, although I suspect this was an inaccurate diagnosis. Will increase Wellbutrin to  mg daily; continue Lexapro unchanged. Will send list of referrals via My Chart for review.  06/17: Continue medications unchanged. Roney Briseno states he does not need refills at this time. Additional list of referrals for long-term outpatient [facility] services sent via My Chart. 3.  Substance Abuse    No active issues  4. Medical   Following with Arslan Contreras MD  5. Return in about 6 weeks (around 7/29/2021).      Total time: 15 minutes spent with patient completing evaluation process and more than 50% of the time was spent completing evaluation and planning treatment with the patient. Gabi Walker, MPH, PMHNP-BC  06/17/21

## 2021-07-09 DIAGNOSIS — G89.29 CHRONIC HEADACHE WITH NEW FEATURES: ICD-10-CM

## 2021-07-09 DIAGNOSIS — R51.9 CHRONIC DAILY HEADACHE: ICD-10-CM

## 2021-07-09 DIAGNOSIS — R51.9 CHRONIC HEADACHE WITH NEW FEATURES: ICD-10-CM

## 2021-07-12 RX ORDER — ATENOLOL 25 MG/1
TABLET ORAL
Qty: 30 TABLET | Refills: 2 | Status: SHIPPED | OUTPATIENT
Start: 2021-07-12 | End: 2021-10-25

## 2021-07-30 ENCOUNTER — OFFICE VISIT (OUTPATIENT)
Dept: ENT CLINIC | Age: 26
End: 2021-07-30
Payer: MEDICAID

## 2021-07-30 VITALS
WEIGHT: 245 LBS | HEIGHT: 70 IN | SYSTOLIC BLOOD PRESSURE: 132 MMHG | HEART RATE: 63 BPM | TEMPERATURE: 98.4 F | BODY MASS INDEX: 35.07 KG/M2 | DIASTOLIC BLOOD PRESSURE: 90 MMHG

## 2021-07-30 DIAGNOSIS — J34.2 DNS (DEVIATED NASAL SEPTUM): ICD-10-CM

## 2021-07-30 DIAGNOSIS — J34.89 NASAL OBSTRUCTION: Primary | ICD-10-CM

## 2021-07-30 DIAGNOSIS — J30.9 ALLERGIC RHINITIS, UNSPECIFIED SEASONALITY, UNSPECIFIED TRIGGER: ICD-10-CM

## 2021-07-30 DIAGNOSIS — J34.1 MAXILLARY SINUS CYST: ICD-10-CM

## 2021-07-30 DIAGNOSIS — J34.3 NASAL TURBINATE HYPERTROPHY: ICD-10-CM

## 2021-07-30 DIAGNOSIS — J32.0 CHRONIC MAXILLARY SINUSITIS: ICD-10-CM

## 2021-07-30 PROCEDURE — 31231 NASAL ENDOSCOPY DX: CPT | Performed by: OTOLARYNGOLOGY

## 2021-07-30 PROCEDURE — G8427 DOCREV CUR MEDS BY ELIG CLIN: HCPCS | Performed by: OTOLARYNGOLOGY

## 2021-07-30 PROCEDURE — 99204 OFFICE O/P NEW MOD 45 MIN: CPT | Performed by: OTOLARYNGOLOGY

## 2021-07-30 PROCEDURE — G8417 CALC BMI ABV UP PARAM F/U: HCPCS | Performed by: OTOLARYNGOLOGY

## 2021-07-30 PROCEDURE — 1036F TOBACCO NON-USER: CPT | Performed by: OTOLARYNGOLOGY

## 2021-07-30 ASSESSMENT — ENCOUNTER SYMPTOMS
PHOTOPHOBIA: 0
VOMITING: 0
NAUSEA: 0
STRIDOR: 0
EYE DISCHARGE: 0
DIARRHEA: 0
SINUS PRESSURE: 1
COUGH: 0
BACK PAIN: 0
CHOKING: 0
FACIAL SWELLING: 0
COLOR CHANGE: 0
CONSTIPATION: 0
RHINORRHEA: 1
SHORTNESS OF BREATH: 0
TROUBLE SWALLOWING: 0
BLOOD IN STOOL: 0
SORE THROAT: 0
VOICE CHANGE: 0
WHEEZING: 0
SINUS PAIN: 0
EYE ITCHING: 0

## 2021-07-30 NOTE — PROGRESS NOTES
[facility]  [address], [address] [place] [address]  P: [phone]  F: [phone]       Patient     Dirk Bragg  1995    ChiefComplaint     Chief Complaint   Patient presents with    New Patient     Patient is here today because he has chronic headaches, runny nose and congestion       History of Present Illness     Dirk Bragg is a pleasant 23 y.o. male who presents as a new referral for consultation for nasal obstruction, allergic rhinitis, sinus headache. Patient has been experiencing significant worsening congestion, rhinorrhea, nasal obstruction, sinus pressure which seems to be triggering headache symptoms. He does have some sound sensitivity and occasional nausea with this. He does will also experience some brief dizziness. Currently on a abortive medication for migraine as well as atenolol. He has been using Flonase intermittently with no relief. He takes Zyrtec daily. He does not use any nasal saline. Denies recurrent sinusitis requiring antibiotics. Denies change in sense of smell. Symptoms have been worsening for about 1 year. They typically occur year-round. Past Medical History     Past Medical History:   Diagnosis Date    Anxiety     Depression        Past Surgical History     No past surgical history on file. Family History     Family History   Problem Relation Age of Onset   [de-identified] Cancer Mother         melanoma    Hypertension Mother     Diabetes Mother     Hypertension Father     Anxiety Disorder Father     Diabetes Father        Social History     Social History     Socioeconomic History    Marital status: Single     Spouse name: Not on file    Number of children: Not on file    Years of education: Not on file    Highest education level: Not on file   Occupational History    Not on file   Tobacco Use    Smoking status: Never Smoker    Smokeless tobacco: Never Used   Substance and Sexual Activity    Alcohol use:  Yes Comment: Occasional, 2-3 x/wk, 1 mixed drink    Drug use: Never    Sexual activity: Not on file   Other Topics Concern    Not on file   Social History Narrative    Finishing grad school    Moved to [place] in 2020.     2 cats. Social Determinants of Health     Financial Resource Strain:     Difficulty of Paying Living Expenses:    Food Insecurity:     Worried About Running Out of Food in the Last Year:     [address] in the Last Year:    Transportation Needs:     Lack of Transportation (Medical):  Lack of Transportation (Non-Medical):    Physical Activity:     Days of Exercise per Week:     Minutes of Exercise per Session:    Stress:     Feeling of Stress :    Social Connections:     Frequency of Communication with Friends and Family:     Frequency of Social Gatherings with Friends and Family:     Attends Bahai Services:     Active Member of Clubs or Organizations:     Attends Club or Organization Meetings:     Marital Status:    Intimate Partner Violence:     Fear of Current or Ex-Partner:     Emotionally Abused:     Physically Abused:     Sexually Abused: Allergies     Allergies   Allergen Reactions    No Known Allergies        Medications     Current Outpatient Medications   Medication Sig Dispense Refill    buPROPion (WELLBUTRIN XL) 150 MG extended release tablet TAKE ONE TABLET BY MOUTH EVERY MORNING 30 tablet 2    atenolol (TENORMIN) 25 MG tablet TAKE ONE TABLET BY MOUTH DAILY 30 tablet 2    escitalopram (LEXAPRO) 20 MG tablet TAKE ONE TABLET BY MOUTH DAILY 30 tablet 4    lisinopril (PRINIVIL;ZESTRIL) 10 MG tablet Take 1 tablet by mouth daily 30 tablet 5    fluticasone (FLONASE) 50 MCG/ACT nasal spray 2 sprays by Each Nostril route daily 1 Bottle 5    tretinoin (RETIN-A) 0.025 % cream Apply topically nightly.  45 g 1    aspirin-acetaminophen-caffeine (EXCEDRIN MIGRAINE) 632-903-44 MG per tablet Take 1 tablet by mouth every 6 hours as needed for Headaches 90 tablet 3     No current facility-administered medications for this visit. Review of Systems     Review of Systems   Constitutional: Negative for activity change, appetite change, chills, diaphoresis, fatigue, fever and unexpected weight change. HENT: Positive for congestion, rhinorrhea, sinus pressure and sneezing. Negative for dental problem, drooling, ear discharge, ear pain, facial swelling, hearing loss, mouth sores, nosebleeds, postnasal drip, sinus pain, sore throat, tinnitus, trouble swallowing and voice change. Eyes: Negative for photophobia, discharge, itching and visual disturbance. Respiratory: Negative for cough, choking, shortness of breath, wheezing and stridor. Gastrointestinal: Negative for blood in stool, constipation, diarrhea, nausea and vomiting. Endocrine: Negative for cold intolerance, heat intolerance, polyphagia and polyuria. Musculoskeletal: Negative for back pain, gait problem, neck pain and neck stiffness. Skin: Negative for color change, pallor, rash and wound. Neurological: Positive for headaches. Negative for dizziness, syncope, facial asymmetry, speech difficulty, light-headedness and numbness. Hematological: Negative for adenopathy. Does not bruise/bleed easily. Psychiatric/Behavioral: Negative for agitation, confusion and sleep disturbance. PhysicalExam     Vitals:    07/30/21 1146   BP: (!) 132/90   Pulse: 63   Temp: 98.4 °F (36.9 °C)       Physical Exam  Constitutional:       Appearance: He is well-developed. HENT:      Head: Normocephalic and atraumatic. Not macrocephalic and not microcephalic. No raccoon eyes, Gage's sign, abrasion, contusion, right periorbital erythema, left periorbital erythema or laceration. Hair is normal.      Jaw: No trismus. Right Ear: Hearing, tympanic membrane and external ear normal. No decreased hearing noted. No drainage, swelling or tenderness. No middle ear effusion. No mastoid tenderness.  Tympanic recommend a CT of the sinuses to further evaluate the nature of the cyst within the maxillary sinus as well as to evaluate presence of any other chronic sinus mucosal inflammation. Patient may begin using his Flonase daily to see if there is any benefit, however I worry that his nasal obstruction septal deviation is so severe it will prevent passage of medication. Additionally will obtain some blood allergy testing as he is currently on a beta-blocker. His deviated septum may be triggering migraine headaches as well. Follow-up afterwards to review results and discuss further management. 2. DNS (deviated nasal septum)      3. Nasal turbinate hypertrophy      4. Allergic rhinitis, unspecified seasonality, unspecified trigger    - Allergen, Respiratory, Region 5 Panel; Future    5. Maxillary sinus cyst    - CT SINUS WO CONTRAST; Future    6. Chronic maxillary sinusitis    - CT SINUS WO CONTRAST; Future      Return in about 2 weeks (around 8/13/2021) for after ct/allergy test.      Portions of this note were dictated using Dragon.  There may be linguistic errors secondary to the use of this program.

## 2021-08-05 LAB
ALLERGEN BERMUDA GRASS IGE: <0.1 KU/L (ref 0–0.34)
ALLERGEN BIRCH IGE: <0.1 KU/L (ref 0–0.34)
ALLERGEN DOG DANDER IGE: 8.86 KU/L (ref 0–0.34)
ALLERGEN GERMAN COCKROACH IGE: 0.56 KU/L (ref 0–0.34)
ALLERGEN HORMODENDRUM IGE: <0.1 KUL/L (ref 0–0.34)
ALLERGEN MOUSE EPITHELIA IGE: 12.7 KU/L (ref 0–0.34)
ALLERGEN OAK TREE IGE: 0.14 KU/L (ref 0–0.34)
ALLERGEN PECAN TREE IGE: <0.1 KU/L (ref 0–0.34)
ALLERGEN PIGWEED ROUGH IGE: <0.1 KU/L (ref 0–0.34)
ALLERGEN SHEEP SORREL (W18) IGE: <0.1 KU/L (ref 0–0.34)
ALLERGEN TREE SYCAMORE: 0.15 KU/L (ref 0–0.34)
ALLERGEN WALNUT TREE IGE: 0.1 KU/L (ref 0–0.34)
ALLERGEN WHITE MULBERRY TREE, IGE: <0.1 KU/L (ref 0–0.34)
ALLERGEN, TREE, WHITE ASH IGE: <0.1 KU/L (ref 0–0.34)
ALTERNARIA ALTERNATA: 0.18 KU/L (ref 0–0.34)
ASPERGILLUS FUMIGATUS: <0.1 KU/L (ref 0–0.34)
CAT DANDER ANTIBODY: 54.3 KU/L (ref 0–0.34)
COTTONWOOD TREE: 0.12 KU/L (ref 0–0.34)
D. FARINAE: 0.16 KU/L (ref 0–0.34)
D. PTERONYSSINUS: 0.18 KU/L (ref 0–0.34)
ELM TREE: <0.1 KU/L (ref 0–0.34)
IGE: 931 IU/ML
MAPLE/BOXELDER TREE: <0.1 KU/L (ref 0–0.34)
MOUNTAIN CEDAR TREE: <0.1 KU/L (ref 0–0.34)
MUCOR RACEMOSUS: <0.1 KU/L (ref 0–0.34)
P. NOTATUM: <0.1 KU/L (ref 0–0.34)
RUSSIAN THISTLE: <0.1 KU/L (ref 0–0.34)
SHORT RAGWD(A ARTEMIS.) IGE: <0.1 KU/L (ref 0–0.34)
TIMOTHY GRASS: <0.1 KU/L (ref 0–0.34)

## 2021-08-17 ENCOUNTER — HOSPITAL ENCOUNTER (OUTPATIENT)
Dept: CT IMAGING | Age: 26
Discharge: HOME | End: 2021-08-17
Payer: MEDICAID

## 2021-08-17 DIAGNOSIS — J32.0 CHRONIC MAXILLARY SINUSITIS: ICD-10-CM

## 2021-08-17 DIAGNOSIS — J34.1 MAXILLARY SINUS CYST: ICD-10-CM

## 2021-08-17 PROCEDURE — 70486 CT MAXILLOFACIAL W/O DYE: CPT

## 2021-08-18 ENCOUNTER — TELEPHONE (OUTPATIENT)
Dept: ENT CLINIC | Age: 26
End: 2021-08-18

## 2021-08-18 NOTE — TELEPHONE ENCOUNTER
----- Message from Surya Jackson DO sent at 8/17/2021  9:04 AM EDT -----  Testing shows significant allergies to mainly pet dander, as well as mouse and cockroach. May contact to discuss immunotherapy if interested.

## 2021-08-23 NOTE — TELEPHONE ENCOUNTER
Copied To:  Marta Best MD

Attending MD:  Marta Best MD



DATE:  08/25/2018



HISTORY OF PRESENT ILLNESS:  The patient is a 23-year-old male with a

history of anxiety and panic attacks.  So, Psychiatry was consulted on the

medical floor to evaluate for panic symptoms.  I reviewed with _____ the

patient at bedside.  The patient is alert and oriented x3 and he is fairly

improved and he is generally cooperative and friendly during my

questioning.  The patient denies any kind of depression; however, reports a

history of generalized anxiety disorder in which he stresses about everyday

things _____ frequently and has intrusive thoughts and worries about

consequential things.  The patient does feel that this worry does affect

functioning at times.  During his panic attack, the patient reports _____

that he might have had one _____ hospitalization.  Presently he denies any

recurrence of current symptoms with decreased shortness of breath,

palpitation, tachycardia, nausea, and feeling unwell.  The last attack was

about 2 to 3 years ago and he generally controlled them according to him. 

It might be a major _____ stressors at this time, he indicated that

financially he is doing better.  In general, he feels like he is

functioning well.  Regarding drug usage, he does smoke marijuana daily,

which can also affect the patient's anxiety level as well, which the

patient was counseled about _____ coherent, he did not hallucinate, he

denies any paranoia _____.



Vital signs and labs were reviewed.



MEDICATIONS:  The patient is not on any relevant psychiatric medication.



PSYCHIATRIC HISTORY:  The patient reports seeing a psychiatrist many years

ago when he was 13 years old because of hyperactivity, and the patient was

given a diagnosis of attention deficit disorder.  The patient does not

recall the medications he tried in the past, but has not seen a

psychiatrist or been on psychiatric medications for over 10 years.  The

patient denies any psychiatric hospitalizations, outpatient treatment,

current psychiatric medications as noted.  The patient denies any suicide

attempt.



SOCIAL HISTORY:  The patient was born and raised in [place].  He is

single.  He has no children.  He lives with his 28-year-old sister and

30-year-old sister.  He _____ graduate high school.  Denies drinking.  He

smokes marijuana daily.  He denies any illicit drug use.



IMPRESSION:  Generalized anxiety disorder as well as panic disorder without

agoraphobia.



RECOMMENDATIONS:  I recommend Klonopin 0.25 mg daily p.r.n. for anxiety. 

The patient does not require psychiatric inpatient stabilization for his

symptoms, and may be discharged AMA if her prefers, if he is

psychiatrically cleared.  He is not danger to himself or others in this

respect.  Please provide him a prescription for Klonopin 0.25 mg daily

p.r.n. that we will hold until patient receives appropriate followup at

either [facility] or [facility] here in the

hospital.  This writer will sign off at this time.





__________________________________________

Marta Best MD



DD:  08/25/2018 11:46:39

DT:  08/25/2018 20:33:36

Job # [id number] Kathi Gonzalez, DO  Justina Renteria  Can this be pushed to PACS? Thanks.

## 2021-08-26 ENCOUNTER — VIRTUAL VISIT (OUTPATIENT)
Dept: INTERNAL MEDICINE CLINIC | Age: 26
End: 2021-08-26
Payer: MEDICAID

## 2021-08-26 DIAGNOSIS — R11.2 NON-INTRACTABLE VOMITING WITH NAUSEA, UNSPECIFIED VOMITING TYPE: Primary | ICD-10-CM

## 2021-08-26 DIAGNOSIS — R10.13 EPIGASTRIC ABDOMINAL PAIN: ICD-10-CM

## 2021-08-26 PROCEDURE — G8427 DOCREV CUR MEDS BY ELIG CLIN: HCPCS | Performed by: INTERNAL MEDICINE

## 2021-08-26 PROCEDURE — 99214 OFFICE O/P EST MOD 30 MIN: CPT | Performed by: INTERNAL MEDICINE

## 2021-08-26 RX ORDER — OMEPRAZOLE 40 MG/1
40 CAPSULE, DELAYED RELEASE ORAL
Qty: 30 CAPSULE | Refills: 1 | Status: SHIPPED | OUTPATIENT
Start: 2021-08-26 | End: 2021-09-30 | Stop reason: ALTCHOICE

## 2021-08-26 RX ORDER — ONDANSETRON 4 MG/1
4 TABLET, FILM COATED ORAL DAILY PRN
Qty: 30 TABLET | Refills: 0 | Status: SHIPPED | OUTPATIENT
Start: 2021-08-26 | End: 2021-10-14 | Stop reason: SDUPTHER

## 2021-08-26 NOTE — PROGRESS NOTES
Tushar Blas (:  1995) is a 23 y.o. male,Established patient, here for evaluation of the following chief complaint(s):   Nausea (nausea and vomitting everyday, diet does not alter symptoms. x2 weeks, has had a few days of being non symptomatic but always comes back )      ASSESSMENT/PLAN:  1. Non-intractable vomiting with nausea, unspecified vomiting type  N/V after eating intermittently for over 2 weeks now. Some mild pain. Unclear cause. DDx includes biliary colic, pancreatitis, reflux, PUD. Do not suspect brain pathology as has had recent head imagine. Will rx zofran and omeprazole and have gets labs and hpylori breath test as below. Also will have him schedule US RUQ. F/u 3 weeks as scheduled. -     CBC Auto Differential; Future  -     Comprehensive Metabolic Panel; Future  -     Lipase; Future  -     TSH with Reflex; Future  -     H. Pylori Breath Test, Adult; Future  -     US GALLBLADDER RUQ; Future  -     omeprazole (PRILOSEC) 40 MG delayed release capsule; Take 1 capsule by mouth every morning (before breakfast), Disp-30 capsule, R-1Normal  -     ondansetron (ZOFRAN) 4 MG tablet; Take 1 tablet by mouth daily as needed for Nausea or Vomiting, Disp-30 tablet, R-0Normal  2. Epigastric abdominal pain  -     CBC Auto Differential; Future  -     Comprehensive Metabolic Panel; Future  -     Lipase; Future  -     TSH with Reflex; Future  -     H. Pylori Breath Test, Adult; Future  -     US GALLBLADDER RUQ; Future  -     omeprazole (PRILOSEC) 40 MG delayed release capsule; Take 1 capsule by mouth every morning (before breakfast), Disp-30 capsule, R-1Normal  -     ondansetron (ZOFRAN) 4 MG tablet; Take 1 tablet by mouth daily as needed for Nausea or Vomiting, Disp-30 tablet, R-0Normal          Return for as scheduled. SUBJECTIVE/OBJECTIVE:  HPI    Over the last 2 weeks had had vomiting after eating intermittently. Had a reprieve for 2 days in the middle.  Has not vomited since Tuesday but starting to feel nauseated again. Starts vomiting 2 hours after eating. Feels queasy all day. Not eating completely normally. Some stomach pain/discomfort associated with this. No blood  BMs less often than normal.   First time happened had overeaten  2nd time had alcohol. Tried pepto bismol a few times but did not help. Review of Systems    Current Outpatient Medications on File Prior to Visit   Medication Sig Dispense Refill    buPROPion (WELLBUTRIN XL) 150 MG extended release tablet TAKE ONE TABLET BY MOUTH EVERY MORNING 30 tablet 2    atenolol (TENORMIN) 25 MG tablet TAKE ONE TABLET BY MOUTH DAILY 30 tablet 2    escitalopram (LEXAPRO) 20 MG tablet TAKE ONE TABLET BY MOUTH DAILY 30 tablet 4    lisinopril (PRINIVIL;ZESTRIL) 10 MG tablet Take 1 tablet by mouth daily 30 tablet 5    fluticasone (FLONASE) 50 MCG/ACT nasal spray 2 sprays by Each Nostril route daily 1 Bottle 5    tretinoin (RETIN-A) 0.025 % cream Apply topically nightly. 45 g 1    aspirin-acetaminophen-caffeine (EXCEDRIN MIGRAINE) 552-967-21 MG per tablet Take 1 tablet by mouth every 6 hours as needed for Headaches 90 tablet 3     No current facility-administered medications on file prior to visit. No flowsheet data found. General - well developed, well nourished, NAD  Mental status - Awake and alert, Oriented x 3. Follows commands. Eyes - EOMI, Sclera normal, No conjunctival discharge or injection  HENT: NCAT, MMM. Normal external ears  Neck - no visualized masses, nl ROM  Pulmonary/Chest - Speaking in full sentences, effort normal, no distress. MSK - Normal gait with no signs of ataxia.   Neuro - No facial asymmetry, no gaze palzy  Skin - no rashes or discoloration of facial skin  Psych - nl appearance and grooming, affect appropriate to mood, no hallucinations  Other pertinent observable physical exam findings-                   Julieta Melendez is a 23 y.o. male being evaluated by a Virtual Visit (video visit) encounter to address concerns as mentioned above. A caregiver was present when appropriate. Due to this being a TeleHealth encounter (During ROHJI-91 public health emergency), evaluation of the following organ systems was limited: Vitals/Constitutional/EENT/Resp/CV/GI//MS/Neuro/Skin/Heme-Lymph-Imm. Pursuant to the emergency declaration under the [address] [place] [address], [address] [place] [address] authority and the [employer] Act, this Virtual Visit was conducted with patient's (and/or legal guardian's) consent, to reduce the patient's risk of exposure to COVID-19 and provide necessary medical care. The patient (and/or legal guardian) has also been advised to contact this office for worsening conditions or problems, and seek emergency medical treatment and/or call 911 if deemed necessary. Patient identification was verified at the start of the visit: Yes    Services were provided through a video synchronous discussion virtually to substitute for in-person clinic visit. Patient was located at home and provider was located in office or at home. An electronic signature was used to authenticate this note.     --Blanquita Gaines MD

## 2021-08-30 ENCOUNTER — TELEPHONE (OUTPATIENT)
Dept: ENT CLINIC | Age: 26
End: 2021-08-30

## 2021-08-30 RX ORDER — ESCITALOPRAM OXALATE 20 MG/1
TABLET ORAL
Qty: 90 TABLET | Refills: 0 | Status: SHIPPED | OUTPATIENT
Start: 2021-08-30 | End: 2021-12-14

## 2021-08-30 NOTE — TELEPHONE ENCOUNTER
----- Message from Octavio Cullen DO sent at 8/27/2021  4:20 PM EDT -----  Sinus ct shows no significant findings. He may follow up to discuss septum/turbinate surgery if interested. Additionally, see if he has scheduled allergy testing.

## 2021-09-07 ENCOUNTER — HOSPITAL ENCOUNTER (OUTPATIENT)
Age: 26
Discharge: HOME | End: 2021-09-07
Payer: MEDICAID

## 2021-09-07 ENCOUNTER — OFFICE VISIT (OUTPATIENT)
Dept: ENT CLINIC | Age: 26
End: 2021-09-07
Payer: MEDICAID

## 2021-09-07 ENCOUNTER — TELEPHONE (OUTPATIENT)
Dept: INTERNAL MEDICINE CLINIC | Age: 26
End: 2021-09-07

## 2021-09-07 ENCOUNTER — HOSPITAL ENCOUNTER (OUTPATIENT)
Dept: GENERAL RADIOLOGY | Age: 26
Discharge: HOME | End: 2021-09-07
Payer: MEDICAID

## 2021-09-07 VITALS
HEART RATE: 93 BPM | HEIGHT: 70 IN | WEIGHT: 245 LBS | TEMPERATURE: 97.3 F | SYSTOLIC BLOOD PRESSURE: 139 MMHG | BODY MASS INDEX: 35.07 KG/M2 | DIASTOLIC BLOOD PRESSURE: 97 MMHG

## 2021-09-07 DIAGNOSIS — G89.29 MID BACK PAIN, CHRONIC: Primary | ICD-10-CM

## 2021-09-07 DIAGNOSIS — J34.3 NASAL TURBINATE HYPERTROPHY: ICD-10-CM

## 2021-09-07 DIAGNOSIS — J34.89 NASAL OBSTRUCTION: Primary | ICD-10-CM

## 2021-09-07 DIAGNOSIS — G89.29 MID BACK PAIN, CHRONIC: ICD-10-CM

## 2021-09-07 DIAGNOSIS — J34.2 DNS (DEVIATED NASAL SEPTUM): ICD-10-CM

## 2021-09-07 DIAGNOSIS — J34.1 MAXILLARY SINUS CYST: ICD-10-CM

## 2021-09-07 DIAGNOSIS — J30.81 ALLERGIC RHINITIS DUE TO ANIMAL HAIR AND DANDER: ICD-10-CM

## 2021-09-07 DIAGNOSIS — M54.9 MID BACK PAIN, CHRONIC: Primary | ICD-10-CM

## 2021-09-07 DIAGNOSIS — M54.9 MID BACK PAIN, CHRONIC: ICD-10-CM

## 2021-09-07 PROCEDURE — 1036F TOBACCO NON-USER: CPT | Performed by: OTOLARYNGOLOGY

## 2021-09-07 PROCEDURE — 72080 X-RAY EXAM THORACOLMB 2/> VW: CPT

## 2021-09-07 PROCEDURE — 99214 OFFICE O/P EST MOD 30 MIN: CPT | Performed by: OTOLARYNGOLOGY

## 2021-09-07 PROCEDURE — G8427 DOCREV CUR MEDS BY ELIG CLIN: HCPCS | Performed by: OTOLARYNGOLOGY

## 2021-09-07 PROCEDURE — G8417 CALC BMI ABV UP PARAM F/U: HCPCS | Performed by: OTOLARYNGOLOGY

## 2021-09-07 ASSESSMENT — ENCOUNTER SYMPTOMS
SORE THROAT: 0
NAUSEA: 0
EYE PAIN: 0
STRIDOR: 0
CHOKING: 0
PHOTOPHOBIA: 0
DIARRHEA: 0
FACIAL SWELLING: 0
TROUBLE SWALLOWING: 0
SHORTNESS OF BREATH: 0
RHINORRHEA: 0
SINUS PAIN: 0
COLOR CHANGE: 0
VOICE CHANGE: 0
SINUS PRESSURE: 0
COUGH: 0
EYE ITCHING: 0
EYE REDNESS: 0

## 2021-09-07 NOTE — PROGRESS NOTES
[facility]  [address] [address] [facility],  [address] [facility] [address]  P: [phone]  F: [phone]       Patient     Ivette Wen  1995    ChiefComplaint     Chief Complaint   Patient presents with    Follow-up     Patient is here today to discuss spetum and turbinate surgery       History of Present Illness     Ivette Wen is a pleasant 23 y.o. male there for follow-up to review sinus CT, allergy testing results and discuss surgery. CT of the sinuses reveals a small roof maxillary retention cyst.  No significant chronic mucosal thickening of the sinuses. Also reveals a septal deviation to the left hand turbinate hypertrophy. Allergy testing reveals positive allergens to cats, dogs, mouse. Patient does have 2 cats in his home. He did is currently taking oral antihistamine with sufficient relief. He is interested in surgery for his nose to help improve nasal obstruction and help assist in usage of nasal sprays for his allergy symptoms. Past Medical History     Past Medical History:   Diagnosis Date    Anxiety     Depression        Past Surgical History     No past surgical history on file.     Family History     Family History   Problem Relation Age of Onset   [place] Cancer Mother         melanoma    Hypertension Mother     Diabetes Mother     Hypertension Father     Anxiety Disorder Father     Diabetes Father        Social History     Social History     Socioeconomic History    Marital status: Single     Spouse name: Not on file    Number of children: Not on file    Years of education: Not on file    Highest education level: Not on file   Occupational History    Not on file   Tobacco Use    Smoking status: Never Smoker    Smokeless tobacco: Never Used   Substance and Sexual Activity    Alcohol use: Yes     Comment: Occasional, 2-3 x/wk, 1 mixed drink    Drug use: Never    Sexual activity: Not on file   Other Topics Concern    Not on file   Social History Narrative Finishing grad school    Moved to [place] in 2020.     2 cats. Social Determinants of Health     Financial Resource Strain:     Difficulty of Paying Living Expenses:    Food Insecurity:     Worried About Running Out of Food in the Last Year:     [address] in the Last Year:    Transportation Needs:     Lack of Transportation (Medical):  Lack of Transportation (Non-Medical):    Physical Activity:     Days of Exercise per Week:     Minutes of Exercise per Session:    Stress:     Feeling of Stress :    Social Connections:     Frequency of Communication with Friends and Family:     Frequency of Social Gatherings with Friends and Family:     Attends Synagogue Services:     Active Member of Clubs or Organizations:     Attends Club or Organization Meetings:     Marital Status:    Intimate Partner Violence:     Fear of Current or Ex-Partner:     Emotionally Abused:     Physically Abused:     Sexually Abused: Allergies     Allergies   Allergen Reactions    No Known Allergies        Medications     Current Outpatient Medications   Medication Sig Dispense Refill    escitalopram (LEXAPRO) 20 MG tablet TAKE ONE TABLET BY MOUTH DAILY 90 tablet 0    omeprazole (PRILOSEC) 40 MG delayed release capsule Take 1 capsule by mouth every morning (before breakfast) 30 capsule 1    ondansetron (ZOFRAN) 4 MG tablet Take 1 tablet by mouth daily as needed for Nausea or Vomiting 30 tablet 0    buPROPion (WELLBUTRIN XL) 150 MG extended release tablet TAKE ONE TABLET BY MOUTH EVERY MORNING 30 tablet 2    atenolol (TENORMIN) 25 MG tablet TAKE ONE TABLET BY MOUTH DAILY 30 tablet 2    lisinopril (PRINIVIL;ZESTRIL) 10 MG tablet Take 1 tablet by mouth daily 30 tablet 5    fluticasone (FLONASE) 50 MCG/ACT nasal spray 2 sprays by Each Nostril route daily 1 Bottle 5    tretinoin (RETIN-A) 0.025 % cream Apply topically nightly.  45 g 1    aspirin-acetaminophen-caffeine (EXCEDRIN MIGRAINE) 438-926-57 MG and reactive to light. Neck:      Thyroid: No thyromegaly. Trachea: Phonation normal. No tracheal deviation. Pulmonary:      Effort: Pulmonary effort is normal. No respiratory distress. Breath sounds: No stridor. Musculoskeletal:      Cervical back: Neck supple. Lymphadenopathy:      Cervical: No cervical adenopathy. Skin:     General: Skin is warm and dry. Neurological:      Mental Status: He is alert and oriented to person, place, and time. Cranial Nerves: No cranial nerve deficit. Psychiatric:         Behavior: Behavior normal.           Procedure           Assessment and Plan     1. Nasal obstruction  Patient's nasal obstruction is largely secondary to deviated septum, enlarged inferior turbinates. He does have a minor functional collapse on the left of the nasal valve. Sinus CT reveals a small nonobstructing maxillary sinus cyst on the right. No other significant sinus findings. Given these findings and symptoms, I recommend septoplasty and turbinate reduction. Risk, benefits and alternatives of the procedure discussed with the patient. Risk include, but not limited to bleeding, infection, pain, septal perforation, septal hematoma, progress medic outcome. Patient understands risk and would like to proceed. He understands her may be a need for second surgery for the nasal valve. 2. DNS (deviated nasal septum)      3. Nasal turbinate hypertrophy      4. Allergic rhinitis due to animal hair and dander  Patient will continue oral antihistamines for now. Will likely restart nasal sprays after completion of surgery. 5. Maxillary sinus cyst  No intervention      No follow-ups on file. Portions of this note were dictated using Dragon.  There may be linguistic errors secondary to the use of this program.

## 2021-09-07 NOTE — TELEPHONE ENCOUNTER
Patient is currently at the imaging center right now. But does not have any orders for his back Xray Can we place this?

## 2021-09-07 NOTE — LETTER
[facility]    Surgery Schedule Request Form: 09/07/21  [address], [address]andra [address]as [place]      DATE OF SURGERY: 09-    TIME OF SURGERY:  1:00 pm            CONF #: ____________________       Patient Information:    Patient name: Parth Simmons    YOB: 1995 Age/Sex:26 y.o./male    SS #:[id number]    Wt Readings from Last 1 Encounters:   09/07/21 245 lb (111.1 kg)       Telephone Information:   Mobile [phone]     Home [phone]     Surgeon & Procedure Information:     Lead surgeon: Tank Summers Co-Surgeon: molina  Phone: [phone] Fax: [phone]  PCP: Megan Baumgarten, MD    Diagnosis: 1. Nasal obstruction  J34.89 2. Deviated nasal septum   J34.2   3. Inferior turbinate hypertrophy  J34.3    Procedure name/CPT: Bilateral Inferior Turbinate Reduction (50838-62) and Septoplasty (98482)    Procedure length: 30 minutes Anesthesia: General    Special Equipment: Nasal tray, turbinate shaver    Patient Status: SDS (OP)    Primary Payor Plan: Senthil Cavazos  Member ID: [id number]   Subscriber name: Santiago Rodriguez    COVID Vaccinated? YES  May 2021    [] Implement attached clinical orders for patient.       Electronically signed by Keila Upton DO on 9/7/2021 at 10:57 AM

## 2021-09-08 ENCOUNTER — HOSPITAL ENCOUNTER (OUTPATIENT)
Dept: ULTRASOUND IMAGING | Age: 26
Discharge: HOME | End: 2021-09-08
Payer: MEDICAID

## 2021-09-08 DIAGNOSIS — R11.2 NON-INTRACTABLE VOMITING WITH NAUSEA, UNSPECIFIED VOMITING TYPE: ICD-10-CM

## 2021-09-08 DIAGNOSIS — R10.13 EPIGASTRIC ABDOMINAL PAIN: ICD-10-CM

## 2021-09-08 PROCEDURE — 76705 ECHO EXAM OF ABDOMEN: CPT

## 2021-09-21 ENCOUNTER — OFFICE VISIT (OUTPATIENT)
Dept: INTERNAL MEDICINE CLINIC | Age: 26
End: 2021-09-21
Payer: MEDICAID

## 2021-09-21 VITALS
OXYGEN SATURATION: 97 % | RESPIRATION RATE: 14 BRPM | WEIGHT: 223 LBS | SYSTOLIC BLOOD PRESSURE: 128 MMHG | HEART RATE: 79 BPM | DIASTOLIC BLOOD PRESSURE: 88 MMHG | BODY MASS INDEX: 32 KG/M2

## 2021-09-21 DIAGNOSIS — R94.31 T WAVE INVERSION ON ELECTROCARDIOGRAM: ICD-10-CM

## 2021-09-21 DIAGNOSIS — Z01.818 PREOP EXAM FOR INTERNAL MEDICINE: ICD-10-CM

## 2021-09-21 DIAGNOSIS — Z23 NEED FOR VACCINATION: ICD-10-CM

## 2021-09-21 DIAGNOSIS — R94.31 ABNORMAL EKG: ICD-10-CM

## 2021-09-21 DIAGNOSIS — R10.13 EPIGASTRIC ABDOMINAL PAIN: ICD-10-CM

## 2021-09-21 DIAGNOSIS — I10 ESSENTIAL HYPERTENSION: ICD-10-CM

## 2021-09-21 DIAGNOSIS — J34.89 NASAL OBSTRUCTION: Primary | ICD-10-CM

## 2021-09-21 DIAGNOSIS — R11.2 NON-INTRACTABLE VOMITING WITH NAUSEA, UNSPECIFIED VOMITING TYPE: ICD-10-CM

## 2021-09-21 PROCEDURE — 90674 CCIIV4 VAC NO PRSV 0.5 ML IM: CPT | Performed by: INTERNAL MEDICINE

## 2021-09-21 PROCEDURE — 90471 IMMUNIZATION ADMIN: CPT | Performed by: INTERNAL MEDICINE

## 2021-09-21 PROCEDURE — 99214 OFFICE O/P EST MOD 30 MIN: CPT | Performed by: INTERNAL MEDICINE

## 2021-09-21 PROCEDURE — G8417 CALC BMI ABV UP PARAM F/U: HCPCS | Performed by: INTERNAL MEDICINE

## 2021-09-21 PROCEDURE — 93000 ELECTROCARDIOGRAM COMPLETE: CPT | Performed by: INTERNAL MEDICINE

## 2021-09-21 PROCEDURE — 1036F TOBACCO NON-USER: CPT | Performed by: INTERNAL MEDICINE

## 2021-09-21 PROCEDURE — G8427 DOCREV CUR MEDS BY ELIG CLIN: HCPCS | Performed by: INTERNAL MEDICINE

## 2021-09-21 NOTE — PROGRESS NOTES
Preoperative Consultation      Ryan Flores  YOB: 1995    Date of Service:  9/21/2021    Vitals:    09/21/21 1634 09/21/21 1656   BP: (!) 132/90 128/88   Pulse: 79    Resp: 14    SpO2: 97%    Weight: 223 lb (101.2 kg)       Wt Readings from Last 2 Encounters:   09/21/21 223 lb (101.2 kg)   09/07/21 245 lb (111.1 kg)     BP Readings from Last 3 Encounters:   09/21/21 128/88   09/07/21 (!) 139/97   07/30/21 (!) 132/90        Chief Complaint   Patient presents with    Follow-up    Pre-op Exam     BILATERAL INFERIOR TURBINATE REDUCTION AND SEPTOPLASTY @ [facility] Dr Cordella Goodell 9/27/21      Allergies   Allergen Reactions    No Known Allergies      Outpatient Medications Marked as Taking for the 9/21/21 encounter (Office Visit) with Kourtney Humphreys MD   Medication Sig Dispense Refill    escitalopram (LEXAPRO) 20 MG tablet TAKE ONE TABLET BY MOUTH DAILY 90 tablet 0    omeprazole (PRILOSEC) 40 MG delayed release capsule Take 1 capsule by mouth every morning (before breakfast) 30 capsule 1    ondansetron (ZOFRAN) 4 MG tablet Take 1 tablet by mouth daily as needed for Nausea or Vomiting 30 tablet 0    buPROPion (WELLBUTRIN XL) 150 MG extended release tablet TAKE ONE TABLET BY MOUTH EVERY MORNING 30 tablet 2    atenolol (TENORMIN) 25 MG tablet TAKE ONE TABLET BY MOUTH DAILY 30 tablet 2    lisinopril (PRINIVIL;ZESTRIL) 10 MG tablet Take 1 tablet by mouth daily 30 tablet 5    fluticasone (FLONASE) 50 MCG/ACT nasal spray 2 sprays by Each Nostril route daily 1 Bottle 5    tretinoin (RETIN-A) 0.025 % cream Apply topically nightly. 45 g 1    aspirin-acetaminophen-caffeine (EXCEDRIN MIGRAINE) 384-025-04 MG per tablet Take 1 tablet by mouth every 6 hours as needed for Headaches 90 tablet 3     Still vomiting sometimes. Thinks stomach acid medication is helping some. RUQ u/s showed hepatic steatosis.    Has not had labs drawn    This patient presents to the office today for a preoperative consultation at the request of surgeon, Dr. Ha Dias, who plans on performing bilateral inferior turbinate reduction and septoplasty on September 27 at [facility].  The current problem began 2 years ago, and symptoms have been worsening with time. Conservative therapy: Yes: nasal sprays, which has been ineffective. .    Planned anesthesia: General   Known anesthesia problems: None   Bleeding risk: No recent or remote history of abnormal bleeding  Personal or FH of DVT/PE: No    Patient objection to receiving blood products: No    Patient Active Problem List   Diagnosis    Allergic rhinitis    Anxiety    Mood disorder ([address]    Chronic daily headache    Mid back pain, chronic    Essential hypertension    Mucous retention cyst of maxillary sinus       Past Medical History:   Diagnosis Date    Anxiety     Depression      No past surgical history on file. Family History   Problem Relation Age of Onset   [place] Favorite Cancer Mother         melanoma    Hypertension Mother     Diabetes Mother     Hypertension Father     Anxiety Disorder Father     Diabetes Father      Social History     Socioeconomic History    Marital status: Single     Spouse name: Not on file    Number of children: Not on file    Years of education: Not on file    Highest education level: Not on file   Occupational History    Not on file   Tobacco Use    Smoking status: Never Smoker    Smokeless tobacco: Never Used   Substance and Sexual Activity    Alcohol use: Yes     Comment: Occasional, 2-3 x/wk, 1 mixed drink    Drug use: Never    Sexual activity: Not on file   Other Topics Concern    Not on file   Social History Narrative    Finishing grad school    Moved to [place] in 2020.     2 cats.       Social Determinants of Health     Financial Resource Strain:     Difficulty of Paying Living Expenses:    Food Insecurity:     Worried About Running Out of Food in the Last Year:     [address] in the Last Year:    Transcriptic Needs:     Lack of Transportation (Medical):  Lack of Transportation (Non-Medical):    Physical Activity:     Days of Exercise per Week:     Minutes of Exercise per Session:    Stress:     Feeling of Stress :    Social Connections:     Frequency of Communication with Friends and Family:     Frequency of Social Gatherings with Friends and Family:     Attends [employer] Services:     Active Member of Clubs or Organizations:     Attends Club or Organization Meetings:     Marital Status:    Intimate Partner Violence:     Fear of Current or Ex-Partner:     Emotionally Abused:     Physically Abused:     Sexually Abused:        Review of Systems  A comprehensive review of systems was negative except for what was noted in the HPI. Physical Exam   Constitutional: He is oriented to person, place, and time. He appears well-developed and well-nourished. No distress. HENT:   Head: Normocephalic and atraumatic. Mouth/Throat: Uvula is midline, oropharynx is clear and moist and mucous membranes are normal.   Eyes: Conjunctivae and EOM are normal. Pupils are equal, round, and reactive to light. Neck: Trachea normal and normal range of motion. Neck supple. No JVD present. Carotid bruit is not present. No mass and no thyromegaly present. Cardiovascular: Normal rate, regular rhythm, normal heart sounds and intact distal pulses. Exam reveals no gallop and no friction rub. No murmur heard. Pulmonary/Chest: Effort normal and breath sounds normal. No respiratory distress. He has no wheezes. He has no rales. Abdominal: Soft. Normal aorta and bowel sounds are normal. He exhibits no distension and no mass. There is no hepatosplenomegaly. No tenderness. Musculoskeletal: He exhibits no edema and no tenderness. Neurological: He is alert and oriented to person, place, and time. He has normal strength. No cranial nerve deficit or sensory deficit. Coordination and gait normal.   Skin: Skin is warm and dry.  No rash noted. No erythema. Psychiatric: He has a normal mood and affect. His behavior is normal.     EKG Interpretation:  normal EKG, normal sinus rhythm, unchanged from previous tracings. Lab Review pending       Assessment:       23 y.o. patient with planned surgery as above. Known risk factors for perioperative complications: Hypertension  Current medications which may produce withdrawal symptoms if withheld perioperatively: atenolol      Plan:     1. Preoperative workup as follows: hemoglobin, hematocrit, electrolytes, creatinine, glucose, liver function studies. CARDIOLOGY CONSULT. 2. Change in medication regimen before surgery: None, Hold all medications on morning of surgery  3. Prophylaxis for cardiac events with perioperative beta-blockers: Currently taking  atenolol  ACC/AHA indications for pre-operative beta-blocker use:    · Vascular surgery with history of postitive stress test  · Intermediate or high risk surgery with history of CAD   · Intermediate or high risk surgery with multiple clinical predictors of CAD- 2 of the following: history of compensated or prior heart failure, history of cerebrovascular disease, DM, or renal insufficiency    Routine administration of higher-dose, long-acting metoprolol in beta-blockernaïve patients on the day of surgery, and in the absence of dose titration is associated with an overall increase in mortality. Beta-blockers should be started days to weeks prior to surgery and titrated to pulse < 70.  4. Deep vein thrombosis prophylaxis: regimen to be chosen by surgical team  5. Will need to postpone surgery until has been evaluated by cardiology for his abnormal EKG in the setting of HTN. Will work on scheduling ASAP. Will refer to GI for eval of his chronic vomiting. Continue PPI.     On this date 9/21/2021 I have spent 35 minutes reviewing previous notes, test results and face to face with the patient discussing the diagnosis and importance of compliance with the treatment plan as well as documenting on the day of the visit.

## 2021-09-21 NOTE — Clinical Note
We need to postpone Macho's currently scheduled surgery.  His EKG was abnormal and he needs eval by cardiology prior.     Nancy Escobar

## 2021-09-22 ENCOUNTER — TELEPHONE (OUTPATIENT)
Dept: INTERNAL MEDICINE CLINIC | Age: 26
End: 2021-09-22

## 2021-09-22 ENCOUNTER — HOSPITAL ENCOUNTER (INPATIENT)
Age: 26
LOS: 3 days | Discharge: HOME | DRG: 420 | End: 2021-09-25
Attending: EMERGENCY MEDICINE | Admitting: INTERNAL MEDICINE
Payer: MEDICAID

## 2021-09-22 DIAGNOSIS — E11.9 DIABETES MELLITUS, NEW ONSET (HCC): Primary | ICD-10-CM

## 2021-09-22 LAB
A/G RATIO: 1.2 (ref 1.1–2.2)
ALBUMIN SERPL-MCNC: 4.2 G/DL (ref 3.4–5)
ALBUMIN SERPL-MCNC: 4.2 G/DL (ref 3.4–5)
ALP BLD-CCNC: 73 U/L (ref 40–129)
ALP BLD-CCNC: 75 U/L (ref 40–129)
ALT SERPL-CCNC: 141 U/L (ref 10–40)
ALT SERPL-CCNC: 144 U/L (ref 10–40)
ANION GAP SERPL CALCULATED.3IONS-SCNC: 17 MMOL/L (ref 3–16)
AST SERPL-CCNC: 120 U/L (ref 15–37)
AST SERPL-CCNC: 124 U/L (ref 15–37)
BASE EXCESS VENOUS: 1.2 MMOL/L (ref -2–3)
BASOPHILS ABSOLUTE: 0.1 K/UL (ref 0–0.2)
BASOPHILS RELATIVE PERCENT: 1 %
BILIRUB SERPL-MCNC: 0.4 MG/DL (ref 0–1)
BILIRUB SERPL-MCNC: 0.4 MG/DL (ref 0–1)
BILIRUBIN DIRECT: <0.2 MG/DL (ref 0–0.3)
BILIRUBIN URINE: NEGATIVE
BILIRUBIN, INDIRECT: ABNORMAL MG/DL (ref 0–1)
BLOOD, URINE: NEGATIVE
BUN BLDV-MCNC: 8 MG/DL (ref 7–20)
CALCIUM SERPL-MCNC: 9.7 MG/DL (ref 8.3–10.6)
CARBOXYHEMOGLOBIN: 1.2 % (ref 0–1.5)
CHLORIDE BLD-SCNC: 92 MMOL/L (ref 99–110)
CHP ED QC CHECK: NORMAL
CHP ED QC CHECK: NORMAL
CLARITY: CLEAR
CO2: 21 MMOL/L (ref 21–32)
COLOR: YELLOW
CREAT SERPL-MCNC: 0.8 MG/DL (ref 0.9–1.3)
EOSINOPHILS ABSOLUTE: 0.4 K/UL (ref 0–0.6)
EOSINOPHILS RELATIVE PERCENT: 4.3 %
GFR AFRICAN AMERICAN: >60
GFR NON-AFRICAN AMERICAN: >60
GLOBULIN: 3.4 G/DL
GLUCOSE BLD-MCNC: 270 MG/DL (ref 70–99)
GLUCOSE BLD-MCNC: 333 MG/DL
GLUCOSE BLD-MCNC: 333 MG/DL (ref 70–99)
GLUCOSE BLD-MCNC: 353 MG/DL
GLUCOSE BLD-MCNC: 353 MG/DL (ref 70–99)
GLUCOSE BLD-MCNC: 359 MG/DL (ref 70–99)
GLUCOSE BLD-MCNC: 421 MG/DL (ref 70–99)
GLUCOSE BLD-MCNC: 660 MG/DL (ref 70–99)
GLUCOSE BLD-MCNC: >600 MG/DL (ref 70–99)
GLUCOSE URINE: 500 MG/DL
HCO3 VENOUS: 26.5 MMOL/L (ref 24–28)
HCT VFR BLD CALC: 43 % (ref 40.5–52.5)
HEMOGLOBIN, VEN, REDUCED: 15.2 %
HEMOGLOBIN: 15.1 G/DL (ref 13.5–17.5)
KETONES, URINE: NEGATIVE MG/DL
LEUKOCYTE ESTERASE, URINE: NEGATIVE
LYMPHOCYTES ABSOLUTE: 2.1 K/UL (ref 1–5.1)
LYMPHOCYTES RELATIVE PERCENT: 25.2 %
MAGNESIUM: 1.8 MG/DL (ref 1.8–2.4)
MCH RBC QN AUTO: 32.5 PG (ref 26–34)
MCHC RBC AUTO-ENTMCNC: 35.1 G/DL (ref 31–36)
MCV RBC AUTO: 92.7 FL (ref 80–100)
METHEMOGLOBIN VENOUS: 0.2 % (ref 0–1.5)
MICROSCOPIC EXAMINATION: ABNORMAL
MONOCYTES ABSOLUTE: 0.5 K/UL (ref 0–1.3)
MONOCYTES RELATIVE PERCENT: 5.8 %
NEUTROPHILS ABSOLUTE: 5.3 K/UL (ref 1.7–7.7)
NEUTROPHILS RELATIVE PERCENT: 63.7 %
NITRITE, URINE: NEGATIVE
O2 SAT, VEN: 85 %
PCO2, VEN: 43.6 MMHG (ref 41–51)
PDW BLD-RTO: 12.8 % (ref 12.4–15.4)
PERFORMED ON: ABNORMAL
PH UA: 6 (ref 5–8)
PH VENOUS: 7.39 (ref 7.35–7.45)
PLATELET # BLD: 250 K/UL (ref 135–450)
PMV BLD AUTO: 10.4 FL (ref 5–10.5)
PO2, VEN: 49.1 MMHG (ref 25–40)
POTASSIUM SERPL-SCNC: 4 MMOL/L (ref 3.5–5.1)
PROTEIN UA: NEGATIVE MG/DL
RBC # BLD: 4.64 M/UL (ref 4.2–5.9)
SODIUM BLD-SCNC: 130 MMOL/L (ref 136–145)
SPECIFIC GRAVITY UA: 1.01 (ref 1–1.03)
TCO2 CALC VENOUS: 28 MMOL/L
TOTAL PROTEIN: 7.5 G/DL (ref 6.4–8.2)
TOTAL PROTEIN: 7.6 G/DL (ref 6.4–8.2)
URINE REFLEX TO CULTURE: ABNORMAL
URINE TYPE: ABNORMAL
UROBILINOGEN, URINE: 0.2 E.U./DL
WBC # BLD: 8.3 K/UL (ref 4–11)

## 2021-09-22 PROCEDURE — 6360000002 HC RX W HCPCS: Performed by: INTERNAL MEDICINE

## 2021-09-22 PROCEDURE — 99284 EMERGENCY DEPT VISIT MOD MDM: CPT

## 2021-09-22 PROCEDURE — 82803 BLOOD GASES ANY COMBINATION: CPT

## 2021-09-22 PROCEDURE — 85025 COMPLETE CBC W/AUTO DIFF WBC: CPT

## 2021-09-22 PROCEDURE — 1200000000 HC SEMI PRIVATE

## 2021-09-22 PROCEDURE — 80053 COMPREHEN METABOLIC PANEL: CPT

## 2021-09-22 PROCEDURE — 84443 ASSAY THYROID STIM HORMONE: CPT

## 2021-09-22 PROCEDURE — 83516 IMMUNOASSAY NONANTIBODY: CPT

## 2021-09-22 PROCEDURE — 2580000003 HC RX 258: Performed by: INTERNAL MEDICINE

## 2021-09-22 PROCEDURE — 6370000000 HC RX 637 (ALT 250 FOR IP): Performed by: INTERNAL MEDICINE

## 2021-09-22 PROCEDURE — 81003 URINALYSIS AUTO W/O SCOPE: CPT

## 2021-09-22 PROCEDURE — 83735 ASSAY OF MAGNESIUM: CPT

## 2021-09-22 PROCEDURE — 36415 COLL VENOUS BLD VENIPUNCTURE: CPT

## 2021-09-22 PROCEDURE — 96372 THER/PROPH/DIAG INJ SC/IM: CPT

## 2021-09-22 PROCEDURE — 6370000000 HC RX 637 (ALT 250 FOR IP): Performed by: NURSE PRACTITIONER

## 2021-09-22 PROCEDURE — 83036 HEMOGLOBIN GLYCOSYLATED A1C: CPT

## 2021-09-22 PROCEDURE — 2580000003 HC RX 258: Performed by: NURSE PRACTITIONER

## 2021-09-22 PROCEDURE — 83721 ASSAY OF BLOOD LIPOPROTEIN: CPT

## 2021-09-22 PROCEDURE — 80061 LIPID PANEL: CPT

## 2021-09-22 RX ORDER — SENNA AND DOCUSATE SODIUM 50; 8.6 MG/1; MG/1
1 TABLET, FILM COATED ORAL 2 TIMES DAILY
Status: DISCONTINUED | OUTPATIENT
Start: 2021-09-22 | End: 2021-09-25 | Stop reason: HOSPADM

## 2021-09-22 RX ORDER — SODIUM CHLORIDE 0.9 % (FLUSH) 0.9 %
10 SYRINGE (ML) INJECTION EVERY 12 HOURS SCHEDULED
Status: DISCONTINUED | OUTPATIENT
Start: 2021-09-22 | End: 2021-09-25 | Stop reason: HOSPADM

## 2021-09-22 RX ORDER — ONDANSETRON 4 MG/1
4 TABLET, ORALLY DISINTEGRATING ORAL EVERY 8 HOURS PRN
Status: DISCONTINUED | OUTPATIENT
Start: 2021-09-22 | End: 2021-09-25 | Stop reason: HOSPADM

## 2021-09-22 RX ORDER — INSULIN LISPRO 100 [IU]/ML
3 INJECTION, SOLUTION INTRAVENOUS; SUBCUTANEOUS ONCE
Status: COMPLETED | OUTPATIENT
Start: 2021-09-22 | End: 2021-09-22

## 2021-09-22 RX ORDER — BUPROPION HYDROCHLORIDE 150 MG/1
150 TABLET ORAL DAILY
Status: DISCONTINUED | OUTPATIENT
Start: 2021-09-23 | End: 2021-09-25 | Stop reason: HOSPADM

## 2021-09-22 RX ORDER — ACETAMINOPHEN 325 MG/1
650 TABLET ORAL EVERY 6 HOURS PRN
Status: DISCONTINUED | OUTPATIENT
Start: 2021-09-22 | End: 2021-09-25 | Stop reason: HOSPADM

## 2021-09-22 RX ORDER — LISINOPRIL 10 MG/1
10 TABLET ORAL DAILY
Status: DISCONTINUED | OUTPATIENT
Start: 2021-09-23 | End: 2021-09-25 | Stop reason: HOSPADM

## 2021-09-22 RX ORDER — SODIUM CHLORIDE 9 MG/ML
25 INJECTION, SOLUTION INTRAVENOUS PRN
Status: DISCONTINUED | OUTPATIENT
Start: 2021-09-22 | End: 2021-09-25 | Stop reason: HOSPADM

## 2021-09-22 RX ORDER — 0.9 % SODIUM CHLORIDE 0.9 %
2000 INTRAVENOUS SOLUTION INTRAVENOUS ONCE
Status: COMPLETED | OUTPATIENT
Start: 2021-09-22 | End: 2021-09-22

## 2021-09-22 RX ORDER — FLUTICASONE PROPIONATE 50 MCG
2 SPRAY, SUSPENSION (ML) NASAL DAILY
Status: DISCONTINUED | OUTPATIENT
Start: 2021-09-23 | End: 2021-09-25 | Stop reason: HOSPADM

## 2021-09-22 RX ORDER — INSULIN LISPRO 100 [IU]/ML
0.08 INJECTION, SOLUTION INTRAVENOUS; SUBCUTANEOUS
Status: DISCONTINUED | OUTPATIENT
Start: 2021-09-22 | End: 2021-09-24

## 2021-09-22 RX ORDER — ACETAMINOPHEN 650 MG/1
650 SUPPOSITORY RECTAL EVERY 6 HOURS PRN
Status: DISCONTINUED | OUTPATIENT
Start: 2021-09-22 | End: 2021-09-25 | Stop reason: HOSPADM

## 2021-09-22 RX ORDER — FAMOTIDINE 20 MG/1
20 TABLET, FILM COATED ORAL 2 TIMES DAILY
Status: DISCONTINUED | OUTPATIENT
Start: 2021-09-22 | End: 2021-09-25 | Stop reason: HOSPADM

## 2021-09-22 RX ORDER — INSULIN LISPRO 100 [IU]/ML
0-12 INJECTION, SOLUTION INTRAVENOUS; SUBCUTANEOUS
Status: DISCONTINUED | OUTPATIENT
Start: 2021-09-22 | End: 2021-09-23

## 2021-09-22 RX ORDER — ATENOLOL 25 MG/1
25 TABLET ORAL ONCE
Status: COMPLETED | OUTPATIENT
Start: 2021-09-22 | End: 2021-09-22

## 2021-09-22 RX ORDER — NICOTINE POLACRILEX 4 MG
15 LOZENGE BUCCAL PRN
Status: DISCONTINUED | OUTPATIENT
Start: 2021-09-22 | End: 2021-09-25 | Stop reason: HOSPADM

## 2021-09-22 RX ORDER — LISINOPRIL 10 MG/1
10 TABLET ORAL ONCE
Status: COMPLETED | OUTPATIENT
Start: 2021-09-22 | End: 2021-09-22

## 2021-09-22 RX ORDER — INSULIN LISPRO 100 [IU]/ML
10 INJECTION, SOLUTION INTRAVENOUS; SUBCUTANEOUS ONCE
Status: DISCONTINUED | OUTPATIENT
Start: 2021-09-22 | End: 2021-09-22

## 2021-09-22 RX ORDER — SODIUM CHLORIDE 0.9 % (FLUSH) 0.9 %
10 SYRINGE (ML) INJECTION PRN
Status: DISCONTINUED | OUTPATIENT
Start: 2021-09-22 | End: 2021-09-25 | Stop reason: HOSPADM

## 2021-09-22 RX ORDER — DEXTROSE MONOHYDRATE 25 G/50ML
12.5 INJECTION, SOLUTION INTRAVENOUS PRN
Status: DISCONTINUED | OUTPATIENT
Start: 2021-09-22 | End: 2021-09-25 | Stop reason: HOSPADM

## 2021-09-22 RX ORDER — DEXTROSE MONOHYDRATE 50 MG/ML
100 INJECTION, SOLUTION INTRAVENOUS PRN
Status: DISCONTINUED | OUTPATIENT
Start: 2021-09-22 | End: 2021-09-25 | Stop reason: HOSPADM

## 2021-09-22 RX ORDER — ESCITALOPRAM OXALATE 20 MG/1
20 TABLET ORAL DAILY
Status: DISCONTINUED | OUTPATIENT
Start: 2021-09-23 | End: 2021-09-25 | Stop reason: HOSPADM

## 2021-09-22 RX ORDER — INSULIN LISPRO 100 [IU]/ML
0-6 INJECTION, SOLUTION INTRAVENOUS; SUBCUTANEOUS NIGHTLY
Status: DISCONTINUED | OUTPATIENT
Start: 2021-09-22 | End: 2021-09-23

## 2021-09-22 RX ORDER — 0.9 % SODIUM CHLORIDE 0.9 %
1000 INTRAVENOUS SOLUTION INTRAVENOUS ONCE
Status: COMPLETED | OUTPATIENT
Start: 2021-09-22 | End: 2021-09-22

## 2021-09-22 RX ORDER — ATENOLOL 25 MG/1
25 TABLET ORAL DAILY
Status: DISCONTINUED | OUTPATIENT
Start: 2021-09-23 | End: 2021-09-25 | Stop reason: HOSPADM

## 2021-09-22 RX ORDER — ONDANSETRON 2 MG/ML
4 INJECTION INTRAMUSCULAR; INTRAVENOUS EVERY 6 HOURS PRN
Status: DISCONTINUED | OUTPATIENT
Start: 2021-09-22 | End: 2021-09-25 | Stop reason: HOSPADM

## 2021-09-22 RX ADMIN — INSULIN GLARGINE 15 UNITS: 100 INJECTION, SOLUTION SUBCUTANEOUS at 22:53

## 2021-09-22 RX ADMIN — INSULIN LISPRO 3 UNITS: 100 INJECTION, SOLUTION INTRAVENOUS; SUBCUTANEOUS at 16:47

## 2021-09-22 RX ADMIN — ENOXAPARIN SODIUM 40 MG: 40 INJECTION SUBCUTANEOUS at 22:53

## 2021-09-22 RX ADMIN — LISINOPRIL 10 MG: 10 TABLET ORAL at 11:30

## 2021-09-22 RX ADMIN — Medication 10 ML: at 21:41

## 2021-09-22 RX ADMIN — SODIUM CHLORIDE 2000 ML: 0.9 INJECTION, SOLUTION INTRAVENOUS at 11:28

## 2021-09-22 RX ADMIN — FAMOTIDINE 20 MG: 20 TABLET, FILM COATED ORAL at 22:54

## 2021-09-22 RX ADMIN — INSULIN LISPRO 3 UNITS: 100 INJECTION, SOLUTION INTRAVENOUS; SUBCUTANEOUS at 14:17

## 2021-09-22 RX ADMIN — SODIUM CHLORIDE 1000 ML: 9 INJECTION, SOLUTION INTRAVENOUS at 15:35

## 2021-09-22 RX ADMIN — INSULIN LISPRO 3 UNITS: 100 INJECTION, SOLUTION INTRAVENOUS; SUBCUTANEOUS at 21:41

## 2021-09-22 RX ADMIN — ATENOLOL 25 MG: 25 TABLET ORAL at 11:29

## 2021-09-22 ASSESSMENT — ENCOUNTER SYMPTOMS
NAUSEA: 1
VOMITING: 1
ABDOMINAL PAIN: 0
RESPIRATORY NEGATIVE: 1
EYES NEGATIVE: 1
DIARRHEA: 0

## 2021-09-22 ASSESSMENT — PAIN SCALES - GENERAL: PAINLEVEL_OUTOF10: 0

## 2021-09-22 NOTE — TELEPHONE ENCOUNTER
Called to tell us that his sugar was 848 and his A1C was 13.4. Called Dr Alex Carreno who will call patient.   Routed to Dr Alex Carreno for [facility] only

## 2021-09-22 NOTE — ED PROVIDER NOTES
ED Attending Attestation Note     Date of evaluation: 9/22/2021    This patient was seen by the advance practice provider. I have seen and examined the patient, agree with the workup, evaluation, management and diagnosis. The care plan has been discussed. My assessment reveals patient here with polyuria and elevated blood sugar, new onset DM. Not ketotic. BS in 400s with IVF. D/W PCP and admission requested.      aDe Corrigan MD  09/22/21 8434

## 2021-09-22 NOTE — TELEPHONE ENCOUNTER
Call from ER. New diabetic. Blood sugar 888, in ER was 660, with A1c 13. 4. After 3 L of fluid sugar was down to 359. ER interested in possible discharge however patient with long, with no social support. Advised he should be admitted for observation,  medication initiation and diabetic education.

## 2021-09-22 NOTE — H&P
Hospital Medicine History & Physical      PCP: Dion Kingston MD    Date of Admission: 9/22/2021    Date of Service: 9/22/2021    Pt seen/examined on 9/22/2021    Admitted to Inpatient with expected LOS greater than two midnights due to medical therapy. Chief Complaint:     Chief Complaint   Patient presents with    Blood Sugar Problem     states, \"Yesterday I had blood work done and my doctor called me and said my blood sugar was to high. I am not formaly diagnosed a diebetic but now we assume I am. She told me to come here. \"       History Of Present Illness:      23 y.o. male who presented to [facility] with polyuria, a/w polydipsia that has been present for quite some time. Today he was in the office for pre-op testing prior to sinus surgery when his A1C was noted to be 13.4%. This is in the context of no known diagnosis of DM. He is otherwise feeling normal.    Blood sugar was initially in the 800s and came down to 300s with IV fluids. Despite giving 3 units of subQ insulin, his blood sugar did not come down much further. He is admitted with a new diagnosis of DM. Past Medical History:      Reviewed and non-contributory except as noted below      Diagnosis Date    Anxiety     Depression     Hypertension        Past Surgical History:      Reviewed and non-contributory except as noted below  No past surgical history on file. Medications Prior to Admission:      Reviewed and non-contributory except as noted below  Prior to Admission medications    Medication Sig Start Date End Date Taking?  Authorizing Provider   escitalopram (LEXAPRO) 20 MG tablet TAKE ONE TABLET BY MOUTH DAILY 8/30/21  Yes Anne Lloyd MD   omeprazole (PRILOSEC) 40 MG delayed release capsule Take 1 capsule by mouth every morning (before breakfast) 8/26/21  Yes Anne Lloyd MD   ondansetron (ZOFRAN) 4 MG tablet Take 1 tablet by mouth daily as needed for Nausea or Vomiting 8/26/21  Yes Anne Lloyd MD buPROPion (WELLBUTRIN XL) 150 MG extended release tablet TAKE ONE TABLET BY MOUTH EVERY MORNING 7/27/21  Yes FRAN Linares CNP   atenolol (TENORMIN) 25 MG tablet TAKE ONE TABLET BY MOUTH DAILY 7/12/21  Yes Jose Martin Lainez MD   lisinopril (PRINIVIL;ZESTRIL) 10 MG tablet Take 1 tablet by mouth daily 5/18/21  Yes Jose Martin Lainez MD   fluticasone Wendelyn Holding) 50 MCG/ACT nasal spray 2 sprays by Each Nostril route daily 3/25/21   Jose Martin Lainez MD   tretinoin (RETIN-A) 0.025 % cream Apply topically nightly. 12/31/20   Jose Martin Lainez MD   aspirin-acetaminophen-caffeine (EXCEDRIN MIGRAINE) 357-060-18 MG per tablet Take 1 tablet by mouth every 6 hours as needed for Headaches 10/20/20   Jose Martin Lainez MD       Allergies:     Reviewed and non-contributory except as noted below   No known allergies    Social History:      Reviewed and non-contributory except as noted below    TOBACCO:   reports that he has never smoked. He has never used smokeless tobacco.  ETOH:   reports current alcohol use. Family History:      Reviewed and non-contributory except as noted below        Problem Relation Age of Onset    Cancer Mother         melanoma    Hypertension Mother     Diabetes Mother     Hypertension Father     Anxiety Disorder Father     Diabetes Father        REVIEW OF SYSTEMS:   Pertinent positives and negatives as noted in the HPI. All other systems reviewed and negative.     PHYSICAL EXAM PERFORMED:    /69   Pulse 52   Temp 98 °F (36.7 °C) (Oral)   Resp 19   SpO2 97%     General appearance:  No acute distress, appears stated age  Eyes: Pupils equal, round, reactive to light, conjunctiva/corneas clear  Ears/Nose/Mouth/Throat: No external lesions or scars, hearing intact to voice  Neck: Trachea midline, no masses noted, no thyromegaly  Respiratory:  Non-labored breathing, clear to auscultation bilaterally  Cardiovascular: Regular rate and rhythm, no murmurs, gallops, or rubs  Abdomen: soft, non-tender, non-distended  Musculoskeletal: Warm, well perfused, no cyanosis or edema  Skin: normal color, no wounds noted  Psychiatric: A&Ox4, good insight and judgment    Labs:     Recent Labs     09/21/21  1715 09/22/21  0958   WBC 5.9 8.3   HGB 15.0 15.1   HCT 45.4 43.0    250     Recent Labs     09/21/21  1715 09/22/21  0957   * 130*   K 4.4 4.0   CL 89* 92*   CO2 20* 21   BUN 8 8   CREATININE 1.0 0.8*   CALCIUM 10.1 9.7     Recent Labs     09/21/21  1715 09/22/21  0957   AST 84* 124*   * 141*   BILITOT 0.5 0.4   ALKPHOS 81 75     No results for input(s): INR in the last 72 hours. No results for input(s): Triston Seed in the last 72 hours. Urinalysis:      Lab Results   Component Value Date    NITRU Negative 09/22/2021    BLOODU Negative 09/22/2021    SPECGRAV 1.010 09/22/2021    GLUCOSEU 500 09/22/2021       Radiology:     No orders to display       ASSESSMENT:    Active Hospital Problems    Diagnosis Date Noted    Diabetes mellitus, new onset ([facility] [address] [E11.9] 09/22/2021       PLAN:    # Diabetes mellitus, new diagnosis  -carb control diet  -lantus w/ mealtime/correctional insulin  -DM education  -check lipids, LFTs, urine alb/cr, TSH, celiac antibiotics    # Depression  # Anxiety  # HTN  -continue home management    DVT Prophylaxis: Lovenox  Diet: No diet orders on file  Code Status: No Order    PT/OT Eval Status: Ongoing    Dispo: Anthony Michelle pending clinical improvement    Gina Jimenez MD    Thank you Fabien Mcdonnell MD for the opportunity to be involved in this patient's care. If you have any questions or concerns please feel free to contact me at [phone].

## 2021-09-22 NOTE — PROGRESS NOTES
List of Home Medications the patient is currently taking is complete. Home Medication list in EPIC updated to reflect changes noted below. Source of medications in list is [facility] prescription fill history. No adjustments made to medication list.    Please note:  · Was unable to reach patient via telephone in his room to confirm last doses/OTC medications. · Am unable to confirm if patient is still taking tretinoin cream (last fills in 2020). Medication Sig   escitalopram (LEXAPRO) 20 MG tablet TAKE ONE TABLET BY MOUTH DAILY   omeprazole (PRILOSEC) 40 MG delayed release capsule Take 1 capsule by mouth every morning (before breakfast)   ondansetron (ZOFRAN) 4 MG tablet Take 1 tablet by mouth daily as needed for Nausea or Vomiting   buPROPion (WELLBUTRIN XL) 150 MG extended release tablet TAKE ONE TABLET BY MOUTH EVERY MORNING   atenolol (TENORMIN) 25 MG tablet TAKE ONE TABLET BY MOUTH DAILY   lisinopril (PRINIVIL;ZESTRIL) 10 MG tablet Take 1 tablet by mouth daily   fluticasone (FLONASE) 50 MCG/ACT nasal spray 2 sprays by Each Nostril route daily   tretinoin (RETIN-A) 0.025 % cream Apply topically nightly. aspirin-acetaminophen-caffeine (EXCEDRIN MIGRAINE) 250-250-65 MG per tablet Take 1 tablet by mouth every 6 hours as needed for Headaches       Please call with any questions.   Read Lela Arreola, [facility]  Main pharmacy: [id number]  9/22/2021 6:20 PM

## 2021-09-22 NOTE — ED PROVIDER NOTES
[address] [facility] [address] ENCOUNTER          NURSE PRACTITIONER NOTE       Date of evaluation: 9/22/2021    Chief Complaint     Blood Sugar Problem (states, \"Yesterday I had blood work done and my doctor called me and said my blood sugar was to high. I am not formaly diagnosed a diebetic but now we assume I am. She told me to come here. \")      History of Present Illness     Timothy Ridshakir is a 23 y.o. male with a past medical history of anxiety, depression and hypertension; who presents to the emergency department with a complaint of an elevated glucose. Patient states he had labs done at his doctor's office yesterday, and was called today and told to come to the emergency department immediately due to an elevated glucose. Labs from yesterday show glucose of 848 and Hemoglobin A1C of 13.4. She has had chronic vomiting for approximately the past month which is what led to the laboratory evaluation, as well as preop exam for sinus surgery he was was to have next week. Patient states that he has had some polyuria and polydipsia, denies any shortness of breath, chest pain, mental fogginess, vision changes. States otherwise he feels like he is in his normal state of health. Review of Systems     Review of Systems   Constitutional: Negative. HENT: Negative. Eyes: Negative. Respiratory: Negative. Cardiovascular: Negative. Gastrointestinal: Positive for nausea and vomiting. Negative for abdominal pain and diarrhea. Endocrine: Positive for polydipsia and polyuria. Musculoskeletal: Negative. Skin: Negative. Neurological: Negative. Hematological: Does not bruise/bleed easily. Psychiatric/Behavioral: Negative. Past Medical, Surgical, Family, and Social History     He has a past medical history of Anxiety, Depression, and Hypertension. He has no past surgical history on file.   His family history includes Anxiety Disorder in his father; Cancer in his mother; Diabetes in his father and mother; Hypertension in his father and mother. He reports that he has never smoked. He has never used smokeless tobacco. He reports current alcohol use. He reports that he does not use drugs. Medications     Previous Medications    ASPIRIN-ACETAMINOPHEN-CAFFEINE (EXCEDRIN MIGRAINE) 250-250-65 MG PER TABLET    Take 1 tablet by mouth every 6 hours as needed for Headaches    ATENOLOL (TENORMIN) 25 MG TABLET    TAKE ONE TABLET BY MOUTH DAILY    BUPROPION (WELLBUTRIN XL) 150 MG EXTENDED RELEASE TABLET    TAKE ONE TABLET BY MOUTH EVERY MORNING    ESCITALOPRAM (LEXAPRO) 20 MG TABLET    TAKE ONE TABLET BY MOUTH DAILY    FLUTICASONE (FLONASE) 50 MCG/ACT NASAL SPRAY    2 sprays by Each Nostril route daily    LISINOPRIL (PRINIVIL;ZESTRIL) 10 MG TABLET    Take 1 tablet by mouth daily    OMEPRAZOLE (PRILOSEC) 40 MG DELAYED RELEASE CAPSULE    Take 1 capsule by mouth every morning (before breakfast)    ONDANSETRON (ZOFRAN) 4 MG TABLET    Take 1 tablet by mouth daily as needed for Nausea or Vomiting    TRETINOIN (RETIN-A) 0.025 % CREAM    Apply topically nightly. Allergies     He is allergic to no known allergies. Physical Exam     INITIAL VITALS: BP: (!) 162/111 (takes meds, did not take today), Temp: 98 °F (36.7 °C), Pulse: 100, Resp: 19, SpO2: 97 %   Physical Exam  Vitals and nursing note reviewed. Constitutional:       Appearance: Normal appearance. He is obese. HENT:      Head: Normocephalic and atraumatic. Cardiovascular:      Rate and Rhythm: Normal rate and regular rhythm. Pulses: Normal pulses. Heart sounds: Normal heart sounds. Pulmonary:      Effort: Pulmonary effort is normal.      Breath sounds: Normal breath sounds. Abdominal:      General: Bowel sounds are normal. There is no distension. Palpations: Abdomen is soft. Tenderness: There is no abdominal tenderness. Musculoskeletal:         General: Normal range of motion.    Skin:     General: Skin is warm and dry.      Capillary Refill: Capillary refill takes less than 2 seconds. Neurological:      Mental Status: He is alert and oriented to person, place, and time.    Psychiatric:         Mood and Affect: Mood normal.         Behavior: Behavior normal.           Diagnostic Results       RADIOLOGY:  No orders to display       LABS:   Results for orders placed or performed during the hospital encounter of 09/22/21   CBC Auto Differential   Result Value Ref Range    WBC 8.3 4.0 - 11.0 K/uL    RBC 4.64 4.20 - 5.90 M/uL    Hemoglobin 15.1 13.5 - 17.5 g/dL    Hematocrit 43.0 40.5 - 52.5 %    MCV 92.7 80.0 - 100.0 fL    MCH 32.5 26.0 - 34.0 pg    MCHC 35.1 31.0 - 36.0 g/dL    RDW 12.8 12.4 - 15.4 %    Platelets 623 459 - 124 K/uL    MPV 10.4 5.0 - 10.5 fL    Neutrophils % 63.7 %    Lymphocytes % 25.2 %    Monocytes % 5.8 %    Eosinophils % 4.3 %    Basophils % 1.0 %    Neutrophils Absolute 5.3 1.7 - 7.7 K/uL    Lymphocytes Absolute 2.1 1.0 - 5.1 K/uL    Monocytes Absolute 0.5 0.0 - 1.3 K/uL    Eosinophils Absolute 0.4 0.0 - 0.6 K/uL    Basophils Absolute 0.1 0.0 - 0.2 K/uL   Comprehensive Metabolic Panel   Result Value Ref Range    Sodium 130 (L) 136 - 145 mmol/L    Potassium 4.0 3.5 - 5.1 mmol/L    Chloride 92 (L) 99 - 110 mmol/L    CO2 21 21 - 32 mmol/L    Anion Gap 17 (H) 3 - 16    Glucose 660 (HH) 70 - 99 mg/dL    BUN 8 7 - 20 mg/dL    CREATININE 0.8 (L) 0.9 - 1.3 mg/dL    GFR Non-African American >60 >60    GFR African American >60 >60    Calcium 9.7 8.3 - 10.6 mg/dL    Total Protein 7.6 6.4 - 8.2 g/dL    Albumin 4.2 3.4 - 5.0 g/dL    Albumin/Globulin Ratio 1.2 1.1 - 2.2    Total Bilirubin 0.4 0.0 - 1.0 mg/dL    Alkaline Phosphatase 75 40 - 129 U/L     (H) 10 - 40 U/L     (H) 15 - 37 U/L    Globulin 3.4 g/dL   Urinalysis Reflex to Culture    Specimen: Urine, clean catch   Result Value Ref Range    Color, UA Yellow Straw/Yellow    Clarity, UA Clear Clear    Glucose, Ur 500 (A) Negative mg/dL    Bilirubin Urine Negative Negative    Ketones, Urine Negative Negative mg/dL    Specific Gravity, UA 1.010 1.005 - 1.030    Blood, Urine Negative Negative    pH, UA 6.0 5.0 - 8.0    Protein, UA Negative Negative mg/dL    Urobilinogen, Urine 0.2 <2.0 E.U./dL    Nitrite, Urine Negative Negative    Leukocyte Esterase, Urine Negative Negative    Microscopic Examination Not Indicated     Urine Type Voided     Urine Reflex to Culture Not Indicated    Blood gas, venous (Lab)   Result Value Ref Range    pH, Pilo 7.392 7.350 - 7.450    pCO2, Pilo 43.6 41.0 - 51.0 mmHg    pO2, Pilo 49.1 (H) 25 - 40 mmHg    HCO3, Venous 26.5 24.0 - 28.0 mmol/L    Base Excess, Pilo 1.2 -2.0 - 3.0 mmol/L    O2 Sat, Pilo 85 Not established %    Carboxyhemoglobin 1.2 0.0 - 1.5 %    MetHgb, Pilo 0.2 0.0 - 1.5 %    TC02 (Calc), Pilo 28 mmol/L    Hemoglobin, Pilo, Reduced 15.20 %   Magnesium   Result Value Ref Range    Magnesium 1.80 1.80 - 2.40 mg/dL   POCT Glucose   Result Value Ref Range    Glucose 353 mg/dL    QC OK? Ok    POCT Glucose   Result Value Ref Range    POC Glucose >600 (AA) 70 - 99 mg/dl    Performed on ACCU-CHEK    POCT Glucose   Result Value Ref Range    Glucose 333 mg/dL    QC OK? OK    POCT Glucose   Result Value Ref Range    POC Glucose 421 (H) 70 - 99 mg/dl    Performed on ACCU-CHEK    POCT Glucose   Result Value Ref Range    POC Glucose 359 (H) 70 - 99 mg/dl    Performed on ACCU-CHEK    POCT Glucose   Result Value Ref Range    POC Glucose 353 (H) 70 - 99 mg/dl    Performed on ACCU-CHEK    POCT Glucose   Result Value Ref Range    POC Glucose 333 (H) 70 - 99 mg/dl    Performed on ACCU-CHEK          RECENT VITALS:  BP: 121/78, Temp: 98 °F (36.7 °C), Pulse: 50, Resp: 19, SpO2: 98 %         ED Course     Nursing Notes, Past Medical Hx, Past Surgical Hx, Social Hx, Allergies, and Family Hx were reviewed.     The patient was given the following medications:  Orders Placed This Encounter   Medications    0.9 % sodium chloride bolus    DISCONTD: insulin lispro (1 Unit Dial) 10 Units    atenolol (TENORMIN) tablet 25 mg    lisinopril (PRINIVIL;ZESTRIL) tablet 10 mg    insulin lispro (1 Unit Dial) 3 Units    0.9 % sodium chloride bolus    insulin lispro (1 Unit Dial) 3 Units            CONSULTS:  IP CONSULT TO PRIMARY CARE PROVIDER    MEDICAL DECISION MAKING / ASSESSMENT / Pricilla Flores is a 23 y.o. male who presents with complaints as noted in HPI. Presents to the emergency department with a complaint of hyperglycemia, and elevated hemoglobin A1c/new onset diabetes. Patient sent in by his PCP based off of routine preop labs that were done yesterday. Patient hemodynamically stable, afebrile, overall well-appearing on my exam.    Initial glucose 660, no urine ketones noted, no acidosis or concern for DKA. He was started on a 2 L normal saline bolus, with reduction of his blood sugar to the 350s; Been a total of 6 units of humalog sq with minimal further reduction of glucose, also given a subsequent liter of normal saline. Despite 3 L normal saline bolus, and insulin; patient remains hyperglycemic. Discussed with Dr. Fina Leos on-call for patient's PCP Dr. Teresa Welch who requests admission for ongoing evaluation management of new onset diabetes, diabetes education, and stabilization of blood sugar. Case discussed with hospitalist, Dr. Cherelle Cruz, who accepted the patient for admission    This patient was also evaluated by the attending physician. All care plans were discussed and agreed upon. Clinical Impression     1.  Diabetes mellitus, new onset [facility])        Disposition     DISPOSITION Decision To Admit 09/22/2021 05:46:09 PM        FRAN Polk CNP  09/24/21 0901

## 2021-09-23 ENCOUNTER — TELEPHONE (OUTPATIENT)
Dept: ENT CLINIC | Age: 26
End: 2021-09-23

## 2021-09-23 LAB
ANION GAP SERPL CALCULATED.3IONS-SCNC: 9 MMOL/L (ref 3–16)
BUN BLDV-MCNC: 5 MG/DL (ref 7–20)
CALCIUM SERPL-MCNC: 8.6 MG/DL (ref 8.3–10.6)
CHLORIDE BLD-SCNC: 102 MMOL/L (ref 99–110)
CHOLESTEROL, TOTAL: 234 MG/DL (ref 0–199)
CO2: 25 MMOL/L (ref 21–32)
CREAT SERPL-MCNC: 0.7 MG/DL (ref 0.9–1.3)
DGP IGA AB: <0.2 U/ML (ref 0–14)
DGP IGG AB: <0.4 U/ML (ref 0–14)
ESTIMATED AVERAGE GLUCOSE: 355.1 MG/DL
GFR AFRICAN AMERICAN: >60
GFR NON-AFRICAN AMERICAN: >60
GLUCOSE BLD-MCNC: 245 MG/DL (ref 70–99)
GLUCOSE BLD-MCNC: 255 MG/DL (ref 70–99)
GLUCOSE BLD-MCNC: 275 MG/DL (ref 70–99)
GLUCOSE BLD-MCNC: 277 MG/DL (ref 70–99)
GLUCOSE BLD-MCNC: 347 MG/DL (ref 70–99)
HBA1C MFR BLD: 14 %
HCT VFR BLD CALC: 37.8 % (ref 40.5–52.5)
HDLC SERPL-MCNC: 27 MG/DL (ref 40–60)
HEMOGLOBIN: 13.1 G/DL (ref 13.5–17.5)
LDL CHOLESTEROL CALCULATED: ABNORMAL MG/DL
LDL CHOLESTEROL DIRECT: 138 MG/DL
MCH RBC QN AUTO: 31.6 PG (ref 26–34)
MCHC RBC AUTO-ENTMCNC: 34.7 G/DL (ref 31–36)
MCV RBC AUTO: 91.1 FL (ref 80–100)
PDW BLD-RTO: 12.9 % (ref 12.4–15.4)
PERFORMED ON: ABNORMAL
PLATELET # BLD: 206 K/UL (ref 135–450)
PMV BLD AUTO: 10 FL (ref 5–10.5)
POTASSIUM REFLEX MAGNESIUM: 3.7 MMOL/L (ref 3.5–5.1)
RBC # BLD: 4.15 M/UL (ref 4.2–5.9)
SODIUM BLD-SCNC: 136 MMOL/L (ref 136–145)
TRIGL SERPL-MCNC: 623 MG/DL (ref 0–150)
TSH REFLEX: 2.04 UIU/ML (ref 0.27–4.2)
VLDLC SERPL CALC-MCNC: ABNORMAL MG/DL
WBC # BLD: 6.6 K/UL (ref 4–11)

## 2021-09-23 PROCEDURE — 6370000000 HC RX 637 (ALT 250 FOR IP): Performed by: INTERNAL MEDICINE

## 2021-09-23 PROCEDURE — 6360000002 HC RX W HCPCS: Performed by: INTERNAL MEDICINE

## 2021-09-23 PROCEDURE — 1200000000 HC SEMI PRIVATE

## 2021-09-23 PROCEDURE — 80048 BASIC METABOLIC PNL TOTAL CA: CPT

## 2021-09-23 PROCEDURE — 85027 COMPLETE CBC AUTOMATED: CPT

## 2021-09-23 PROCEDURE — 36415 COLL VENOUS BLD VENIPUNCTURE: CPT

## 2021-09-23 PROCEDURE — 2580000003 HC RX 258: Performed by: INTERNAL MEDICINE

## 2021-09-23 RX ORDER — INSULIN LISPRO 100 [IU]/ML
0-9 INJECTION, SOLUTION INTRAVENOUS; SUBCUTANEOUS NIGHTLY
Status: DISCONTINUED | OUTPATIENT
Start: 2021-09-23 | End: 2021-09-25 | Stop reason: HOSPADM

## 2021-09-23 RX ORDER — INSULIN LISPRO 100 [IU]/ML
0-18 INJECTION, SOLUTION INTRAVENOUS; SUBCUTANEOUS
Status: DISCONTINUED | OUTPATIENT
Start: 2021-09-23 | End: 2021-09-25 | Stop reason: HOSPADM

## 2021-09-23 RX ADMIN — INSULIN LISPRO 6 UNITS: 100 INJECTION, SOLUTION INTRAVENOUS; SUBCUTANEOUS at 09:08

## 2021-09-23 RX ADMIN — FAMOTIDINE 20 MG: 20 TABLET, FILM COATED ORAL at 08:22

## 2021-09-23 RX ADMIN — FAMOTIDINE 20 MG: 20 TABLET, FILM COATED ORAL at 20:44

## 2021-09-23 RX ADMIN — Medication 10 ML: at 16:23

## 2021-09-23 RX ADMIN — ATENOLOL 25 MG: 25 TABLET ORAL at 08:24

## 2021-09-23 RX ADMIN — INSULIN LISPRO 8 UNITS: 100 INJECTION, SOLUTION INTRAVENOUS; SUBCUTANEOUS at 09:08

## 2021-09-23 RX ADMIN — ESCITALOPRAM OXALATE 20 MG: 20 TABLET ORAL at 08:23

## 2021-09-23 RX ADMIN — BUPROPION HYDROCHLORIDE 150 MG: 150 TABLET, EXTENDED RELEASE ORAL at 08:22

## 2021-09-23 RX ADMIN — INSULIN LISPRO 8 UNITS: 100 INJECTION, SOLUTION INTRAVENOUS; SUBCUTANEOUS at 13:06

## 2021-09-23 RX ADMIN — INSULIN LISPRO 9 UNITS: 100 INJECTION, SOLUTION INTRAVENOUS; SUBCUTANEOUS at 18:24

## 2021-09-23 RX ADMIN — INSULIN LISPRO 8 UNITS: 100 INJECTION, SOLUTION INTRAVENOUS; SUBCUTANEOUS at 18:25

## 2021-09-23 RX ADMIN — ENOXAPARIN SODIUM 40 MG: 40 INJECTION SUBCUTANEOUS at 08:24

## 2021-09-23 RX ADMIN — LISINOPRIL 10 MG: 10 TABLET ORAL at 08:23

## 2021-09-23 RX ADMIN — INSULIN LISPRO 6 UNITS: 100 INJECTION, SOLUTION INTRAVENOUS; SUBCUTANEOUS at 20:39

## 2021-09-23 RX ADMIN — Medication 10 ML: at 21:37

## 2021-09-23 RX ADMIN — INSULIN LISPRO 9 UNITS: 100 INJECTION, SOLUTION INTRAVENOUS; SUBCUTANEOUS at 13:06

## 2021-09-23 ASSESSMENT — PAIN SCALES - GENERAL
PAINLEVEL_OUTOF10: 0

## 2021-09-23 NOTE — PROGRESS NOTES
Patient admitted to rm. 3305 from ED for new onset DM. Patient alert&orientedx4. VSS/RA. Belongings/valuables remain with patient. Patient instructed to use call light for assistance. Bed low and in locked position. Bedside table and call light within reach. Will continue to monitor and assess patient needs.

## 2021-09-23 NOTE — CARE COORDINATION
Cm met with pt at bedside. From home, IPTA. Pt has PCP and insurance. New onset DM. Cm anticipates no needs for dc.

## 2021-09-23 NOTE — CONSULTS
NUTRITION NOTE   Admission Date: 9/22/2021     Type and Reason for Visit: Consult, Positive Nutrition Screen    NUTRITION RECOMMENDATIONS:   1. PO Diet: Continue current 4 carb choice diet  2. Nutrition Education: provided diabetes diet education     NUTRITION ASSESSMENT:  Received positive nutrition screen for n/v and consult for diet education. Patient states he is currently not nauseous but did have vomitting before admit. Noted pt newly dx diabetes mellitus with an HbA1c of 13.4% per chart review. RD provided verbal and written diet education on a diabetic diet. Discussed the basic guidelines on diet for diabetes including: what foods raise blood sugars, carbohydrate containing foods, portion sizes, and maintaining a consistent carbohydrate intake. RD provided handouts on carbohydrate counting and meal planning. Pt verbalized understanding and had no further questions at this time. Will continue to monitor. Patient admitted d/t elevated blood sugars, polyuria, a/w polydipsia     PMH significant for: newly dx DM    MALNUTRITION ASSESSMENT      Malnutrition Status: No malnutrition    NUTRITION DIAGNOSIS   · Altered nutrition-related lab values related to endocrine dysfuntion as evidenced by lab values (Alc 13.4%)    NUTRITION INTERVENTION  Food and/or Nutrient Delivery:  Continue Current Diet  Nutrition Education/Counseling:  Education completed      The patient will still be monitored per nutrition standards of care. Consult dietitian if nutrition interventions essential to patient care is needed.      Dolores Gifford, [address] [place] [address],   Antoine:  [phone]  Office:  [phone]

## 2021-09-23 NOTE — PROGRESS NOTES
Occupational Therapy/ Physical Therapy    Attempt to see pt this AM for OT/PT evals. Pt up ad minna in room moving independently. No therapy concerns at this time no needs for dc. Please re-order if appropriate.  Signed off prior to eval.     Alex Bledsoe, [address] [facility], OTR/L  Yanet Franco PT  [id number]

## 2021-09-23 NOTE — TELEPHONE ENCOUNTER
----- Message from Cat Dobbins DO sent at 9/22/2021  3:46 PM EDT -----      ----- Message -----  From: Pina Vázquez MD  Sent: 9/21/2021   5:13 PM EDT  To: Cat Dobbins DO    We need to postpone Macho's currently scheduled surgery.  His EKG was abnormal and he needs eval by cardiology prior. Etienne Rios

## 2021-09-24 LAB
ANION GAP SERPL CALCULATED.3IONS-SCNC: 11 MMOL/L (ref 3–16)
BUN BLDV-MCNC: 7 MG/DL (ref 7–20)
CALCIUM SERPL-MCNC: 8.5 MG/DL (ref 8.3–10.6)
CHLORIDE BLD-SCNC: 100 MMOL/L (ref 99–110)
CO2: 26 MMOL/L (ref 21–32)
CREAT SERPL-MCNC: 0.7 MG/DL (ref 0.9–1.3)
GFR AFRICAN AMERICAN: >60
GFR NON-AFRICAN AMERICAN: >60
GLUCOSE BLD-MCNC: 223 MG/DL (ref 70–99)
GLUCOSE BLD-MCNC: 238 MG/DL (ref 70–99)
GLUCOSE BLD-MCNC: 242 MG/DL (ref 70–99)
GLUCOSE BLD-MCNC: 257 MG/DL (ref 70–99)
GLUCOSE BLD-MCNC: 270 MG/DL (ref 70–99)
GLUCOSE BLD-MCNC: 273 MG/DL (ref 70–99)
GLUCOSE BLD-MCNC: 317 MG/DL (ref 70–99)
GLUCOSE BLD-MCNC: 325 MG/DL (ref 70–99)
HCT VFR BLD CALC: 37.8 % (ref 40.5–52.5)
HEMOGLOBIN: 13.1 G/DL (ref 13.5–17.5)
MAGNESIUM: 1.8 MG/DL (ref 1.8–2.4)
MCH RBC QN AUTO: 31.8 PG (ref 26–34)
MCHC RBC AUTO-ENTMCNC: 34.8 G/DL (ref 31–36)
MCV RBC AUTO: 91.5 FL (ref 80–100)
PDW BLD-RTO: 13.5 % (ref 12.4–15.4)
PERFORMED ON: ABNORMAL
PLATELET # BLD: 200 K/UL (ref 135–450)
PMV BLD AUTO: 9.8 FL (ref 5–10.5)
POTASSIUM REFLEX MAGNESIUM: 3.5 MMOL/L (ref 3.5–5.1)
RBC # BLD: 4.13 M/UL (ref 4.2–5.9)
SODIUM BLD-SCNC: 137 MMOL/L (ref 136–145)
WBC # BLD: 5.9 K/UL (ref 4–11)

## 2021-09-24 PROCEDURE — 6370000000 HC RX 637 (ALT 250 FOR IP): Performed by: INTERNAL MEDICINE

## 2021-09-24 PROCEDURE — 2580000003 HC RX 258: Performed by: INTERNAL MEDICINE

## 2021-09-24 PROCEDURE — 83735 ASSAY OF MAGNESIUM: CPT

## 2021-09-24 PROCEDURE — 85027 COMPLETE CBC AUTOMATED: CPT

## 2021-09-24 PROCEDURE — 6360000002 HC RX W HCPCS: Performed by: INTERNAL MEDICINE

## 2021-09-24 PROCEDURE — 1200000000 HC SEMI PRIVATE

## 2021-09-24 PROCEDURE — 80048 BASIC METABOLIC PNL TOTAL CA: CPT

## 2021-09-24 PROCEDURE — 36415 COLL VENOUS BLD VENIPUNCTURE: CPT

## 2021-09-24 RX ORDER — INSULIN LISPRO 100 [IU]/ML
10 INJECTION, SOLUTION INTRAVENOUS; SUBCUTANEOUS ONCE
Status: COMPLETED | OUTPATIENT
Start: 2021-09-24 | End: 2021-09-24

## 2021-09-24 RX ORDER — ATORVASTATIN CALCIUM 80 MG/1
80 TABLET, FILM COATED ORAL NIGHTLY
Qty: 30 TABLET | Refills: 3 | Status: SHIPPED | OUTPATIENT
Start: 2021-09-24 | End: 2021-09-25

## 2021-09-24 RX ORDER — BLOOD-GLUCOSE METER
1 KIT MISCELLANEOUS
Qty: 1 KIT | Refills: 0 | Status: SHIPPED | OUTPATIENT
Start: 2021-09-24 | End: 2021-09-25 | Stop reason: SDUPTHER

## 2021-09-24 RX ORDER — GLUCOSAMINE HCL/CHONDROITIN SU 500-400 MG
CAPSULE ORAL
Qty: 100 STRIP | Refills: 3 | Status: SHIPPED | OUTPATIENT
Start: 2021-09-24 | End: 2021-09-25 | Stop reason: SDUPTHER

## 2021-09-24 RX ORDER — INSULIN LISPRO 100 [IU]/ML
INJECTION, SOLUTION INTRAVENOUS; SUBCUTANEOUS
Qty: 5 PEN | Refills: 2 | Status: SHIPPED | OUTPATIENT
Start: 2021-09-24 | End: 2021-09-25 | Stop reason: SDUPTHER

## 2021-09-24 RX ORDER — PEN NEEDLE, DIABETIC 31 G X1/4"
1 NEEDLE, DISPOSABLE MISCELLANEOUS
Qty: 100 EACH | Refills: 3 | Status: SHIPPED | OUTPATIENT
Start: 2021-09-24 | End: 2021-09-25 | Stop reason: SDUPTHER

## 2021-09-24 RX ORDER — LANCETS 28 GAUGE
1 EACH MISCELLANEOUS
Qty: 100 EACH | Refills: 3 | Status: SHIPPED | OUTPATIENT
Start: 2021-09-24 | End: 2021-09-25 | Stop reason: SDUPTHER

## 2021-09-24 RX ORDER — INSULIN GLARGINE 100 [IU]/ML
20 INJECTION, SOLUTION SUBCUTANEOUS 2 TIMES DAILY
Qty: 5 PEN | Refills: 3 | Status: SHIPPED | OUTPATIENT
Start: 2021-09-24 | End: 2021-09-25 | Stop reason: SDUPTHER

## 2021-09-24 RX ORDER — INSULIN LISPRO 100 [IU]/ML
5 INJECTION, SOLUTION INTRAVENOUS; SUBCUTANEOUS
Status: DISCONTINUED | OUTPATIENT
Start: 2021-09-24 | End: 2021-09-25 | Stop reason: HOSPADM

## 2021-09-24 RX ORDER — ATORVASTATIN CALCIUM 80 MG/1
80 TABLET, FILM COATED ORAL NIGHTLY
Status: DISCONTINUED | OUTPATIENT
Start: 2021-09-24 | End: 2021-09-25 | Stop reason: HOSPADM

## 2021-09-24 RX ADMIN — ATORVASTATIN CALCIUM 80 MG: 80 TABLET, FILM COATED ORAL at 20:14

## 2021-09-24 RX ADMIN — ENOXAPARIN SODIUM 40 MG: 40 INJECTION SUBCUTANEOUS at 10:15

## 2021-09-24 RX ADMIN — METFORMIN HYDROCHLORIDE 500 MG: 500 TABLET ORAL at 10:41

## 2021-09-24 RX ADMIN — Medication 10 ML: at 20:24

## 2021-09-24 RX ADMIN — INSULIN LISPRO 3 UNITS: 100 INJECTION, SOLUTION INTRAVENOUS; SUBCUTANEOUS at 20:17

## 2021-09-24 RX ADMIN — FAMOTIDINE 20 MG: 20 TABLET, FILM COATED ORAL at 10:14

## 2021-09-24 RX ADMIN — FLUTICASONE PROPIONATE 2 SPRAY: 50 SPRAY, METERED NASAL at 10:41

## 2021-09-24 RX ADMIN — INSULIN GLARGINE 15 UNITS: 100 INJECTION, SOLUTION SUBCUTANEOUS at 09:00

## 2021-09-24 RX ADMIN — INSULIN LISPRO 9 UNITS: 100 INJECTION, SOLUTION INTRAVENOUS; SUBCUTANEOUS at 18:42

## 2021-09-24 RX ADMIN — FAMOTIDINE 20 MG: 20 TABLET, FILM COATED ORAL at 20:15

## 2021-09-24 RX ADMIN — INSULIN LISPRO 5 UNITS: 100 INJECTION, SOLUTION INTRAVENOUS; SUBCUTANEOUS at 18:40

## 2021-09-24 RX ADMIN — LISINOPRIL 10 MG: 10 TABLET ORAL at 10:14

## 2021-09-24 RX ADMIN — METFORMIN HYDROCHLORIDE 500 MG: 500 TABLET ORAL at 18:48

## 2021-09-24 RX ADMIN — INSULIN LISPRO 10 UNITS: 100 INJECTION, SOLUTION INTRAVENOUS; SUBCUTANEOUS at 13:27

## 2021-09-24 RX ADMIN — ATENOLOL 25 MG: 25 TABLET ORAL at 10:40

## 2021-09-24 RX ADMIN — INSULIN LISPRO 12 UNITS: 100 INJECTION, SOLUTION INTRAVENOUS; SUBCUTANEOUS at 13:28

## 2021-09-24 RX ADMIN — ACETAMINOPHEN 650 MG: 325 TABLET ORAL at 18:49

## 2021-09-24 RX ADMIN — BUPROPION HYDROCHLORIDE 150 MG: 150 TABLET, EXTENDED RELEASE ORAL at 10:14

## 2021-09-24 RX ADMIN — INSULIN LISPRO 6 UNITS: 100 INJECTION, SOLUTION INTRAVENOUS; SUBCUTANEOUS at 09:00

## 2021-09-24 RX ADMIN — Medication 10 ML: at 09:00

## 2021-09-24 RX ADMIN — ESCITALOPRAM OXALATE 20 MG: 20 TABLET ORAL at 10:14

## 2021-09-24 ASSESSMENT — PAIN SCALES - GENERAL
PAINLEVEL_OUTOF10: 4
PAINLEVEL_OUTOF10: 0

## 2021-09-24 NOTE — PROGRESS NOTES
RN to bedside to check Pt's blood glucose for dinner. Pt's blood glucose found to be 275 at this time. RN verbally instructed and physically demonstrated to Pt on how to clean the tip of the insulin pen with an alcohol swab, how to attach the pen needle, and how to prime the insulin pen before each and every use. All of Pt's questions answered at this time. Pt verbalized his understanding and had cleansed his skin along with injecting himself with a nurse drawn dose of insulin at lunch time following verbal instruction given by this RN on how to do so. RN verbally instructed to Pt on how to set the dial on the pen during dinner. Pt seemed confused and stated that \"there is no 17\" labelled on the dial. RN educated Pt at this time that the units on the pen are labeled with the numeral value for every other unit and that the dashes following were the sequential units. Pt verbalized his understanding at this time. Second RN to bedside to verify that the Pt had drawn up the correct dose before he self administered his own insulin dose according to his ordered prandial and sliding scaled insulin. Please refer to the [facility]. RN will continue to monitor Pt.

## 2021-09-24 NOTE — PROGRESS NOTES
Pt educated on DM, insulin usage, types of insulin as well as demonstrated proper injection. Pt resting no complaints at this time. RR easy and unlabored. VSS. Bed locked and in lowest position. Pt updated on care plan/status. Will continue to monitor.

## 2021-09-24 NOTE — PROGRESS NOTES
Hospitalist Progress Note      PCP: Sharona Zamora MD    Date of Admission: 9/22/2021    Chief Complaint: Blood sugar problem    Hospital Course: Admitted for new onset DM. On lantus and SSI. Blood glucose still elevated. Morning lantus added    Subjective: Pt states he is doing ok. Denies any complaints. Medications:  Reviewed    Infusion Medications    dextrose      sodium chloride       Scheduled Medications    insulin glargine  25 Units SubCUTAneous Nightly    insulin glargine  15 Units SubCUTAneous QAM    metFORMIN  500 mg Oral BID WC    atorvastatin  80 mg Oral Nightly    insulin lispro  0-18 Units SubCUTAneous TID WC    insulin lispro  0-9 Units SubCUTAneous Nightly    atenolol  25 mg Oral Daily    buPROPion  150 mg Oral Daily    escitalopram  20 mg Oral Daily    fluticasone  2 spray Each Nostril Daily    lisinopril  10 mg Oral Daily    sodium chloride flush  10 mL IntraVENous 2 times per day    sennosides-docusate sodium  1 tablet Oral BID    famotidine  20 mg Oral BID    enoxaparin  40 mg SubCUTAneous Daily     PRN Meds: glucose, dextrose, glucagon (rDNA), dextrose, sodium chloride flush, sodium chloride, ondansetron **OR** ondansetron, magnesium hydroxide, acetaminophen **OR** acetaminophen      Intake/Output Summary (Last 24 hours) at 9/24/2021 1630  Last data filed at 9/24/2021 0400  Gross per 24 hour   Intake 190 ml   Output 0 ml   Net 190 ml       Physical Exam Performed:    /85   Pulse 63   Temp 97.1 °F (36.2 °C) (Oral)   Resp 16   Ht 5' 10\" (1.778 m)   Wt 230 lb (104.3 kg)   SpO2 96%   BMI 33.00 kg/m²     General appearance: No apparent distress, appears stated age and cooperative. Obese  HEENT: Pupils equal, round, and reactive to light. Conjunctivae/corneas clear. Neck: Supple, with full range of motion. No jugular venous distention. Trachea midline. Respiratory:  Normal respiratory effort.  Clear to auscultation, bilaterally without Rales/Wheezes/Rhonchi. Cardiovascular: Regular rate and rhythm with normal S1/S2 without murmurs, rubs or gallops. Abdomen: Soft, non-tender, non-distended with normal bowel sounds. Musculoskeletal: No clubbing, cyanosis or edema bilaterally. Full range of motion without deformity. Skin: Skin color, texture, turgor normal.  No rashes or lesions. Neurologic:  Neurovascularly intact without any focal sensory/motor deficits. Cranial nerves: II-XII intact, grossly non-focal.  Psychiatric: Alert and oriented, thought content appropriate, normal insight  Capillary Refill: Brisk,< 3 seconds   Peripheral Pulses: +2 palpable, equal bilaterally       Labs:   Recent Labs     09/22/21  0958 09/23/21  0534 09/24/21  0654   WBC 8.3 6.6 5.9   HGB 15.1 13.1* 13.1*   HCT 43.0 37.8* 37.8*    206 200     Recent Labs     09/22/21  0957 09/23/21  0534 09/24/21  0654   * 136 137   K 4.0 3.7 3.5   CL 92* 102 100   CO2 21 25 26   BUN 8 5* 7   CREATININE 0.8* 0.7* 0.7*   CALCIUM 9.7 8.6 8.5     Recent Labs     09/21/21  1715 09/22/21  0949 09/22/21  0957   AST 84* 120* 124*   * 144* 141*   BILIDIR  --  <0.2  --    BILITOT 0.5 0.4 0.4   ALKPHOS 81 73 75     No results for input(s): INR in the last 72 hours. No results for input(s): [place]te Dec in the last 72 hours.     Urinalysis:      Lab Results   Component Value Date    NITRU Negative 09/22/2021    BLOODU Negative 09/22/2021    SPECGRAV 1.010 09/22/2021    GLUCOSEU 500 09/22/2021       Radiology:  No orders to display           Assessment/Plan:  # Diabetes mellitus, new diagnosis  -Lipid panel, TSH, urine albumin/creatinine ratio and celiac Ab pending  -Monitor blood glucose, elevated  -Cont carb control diet  -Continue high-dose sliding scale insulin  -Nightly Lantus increased to 25 units and morning Lantus added  -Schedule Humalog with meals reduce to 5 units daily    # HTN  -Monitor BP  -Continue atenolol and lisinopril     # Depression/Anxiety  -Continue wellbutrin and lexapro    DVT Prophylaxis: Lovenox  Diet: ADULT DIET; Regular; 4 carb choices (60 gm/meal)  Code Status: Full Code    PT/OT Eval Status:  At his baseline    Dispo - Pending BG control, insulin adjustment    Analilia Kearns MD

## 2021-09-24 NOTE — DISCHARGE SUMMARY
Hospital Medicine Discharge Summary    Patient ID: Geno Meza      Patient's PCP: Angie Solitario MD    Admit Date: 9/22/2021     Discharge Date:  09/25/21    Admitting Physician: Deb Dhaliwal MD     Discharge Physician: Becky Maza MD     Discharge Diagnoses: Active Hospital Problems    Diagnosis Date Noted    [facility] [E11.9] 09/22/2021       The patient was seen and examined on day of discharge and this discharge summary is in conjunction with any daily progress note from day of discharge. Hospital Course:   Patient was admitted and treated for following:    # Diabetes mellitus, new diagnosis  Lipid panel, TSH, urine albumin/creatinine ratio and celiac Ab were ordered. Blood glucose was monitored. Patient was started on Lantus, sliding scale insulin and carb controlled diet. Dietitian was consulted. Patient was given education regarding carb controlled and insulin management. Lipid panel showed elevated triglycerides. TSH was normal.  Celiac antibodies were negative. Metformin was added. Lantus was increased to 2 times daily. Patient instructed to follow-up with PCP or schedule an appointment with endocrinologist.  Patient is being discharged in stable condition with recommendation to monitor blood glucose and call PCP/endocrinology if blood glucose continues to be elevated. Patient demonstrated understanding. #Hyperlipidemia  Lipid panel showed elevated triglycerides. Patient was started on high-dose statin. # HTN  Blood pressure was monitored. Patient was continued on atenolol and lisinopril.     # Depression/Anxiety  Wellbutrin and lexapro was continued      Physical Exam Performed:     /85   Pulse 63   Temp 97.1 °F (36.2 °C) (Oral)   Resp 16   Ht 5' 10\" (1.778 m)   Wt 230 lb (104.3 kg)   SpO2 96%   BMI 33.00 kg/m²       General appearance:  No apparent distress, appears stated age and cooperative.   Obese  HEENT: Normal cephalic, atraumatic without obvious deformity. Pupils equal, round, and reactive to light. Extra ocular muscles intact. Conjunctivae/corneas clear. Neck: Supple, with full range of motion. No jugular venous distention. Trachea midline. Respiratory:  Normal respiratory effort. Clear to auscultation, bilaterally without Rales/Wheezes/Rhonchi. Cardiovascular:  Regular rate and rhythm with normal S1/S2 without murmurs, rubs or gallops. Abdomen: Soft, non-tender, non-distended with normal bowel sounds. Musculoskeletal:  No clubbing, cyanosis or edema bilaterally. Full range of motion without deformity. Skin: Skin color, texture, turgor normal.  No rashes or lesions. Neurologic:  Neurovascularly intact without any focal sensory/motor deficits. Cranial nerves: II-XII intact, grossly non-focal.  Psychiatric:  Alert and oriented, thought content appropriate, normal insight  Capillary Refill: Brisk,< 3 seconds   Peripheral Pulses: +2 palpable, equal bilaterally       Labs: For convenience and continuity at follow-up the following most recent labs are provided:      CBC:    Lab Results   Component Value Date    WBC 5.9 09/24/2021    HGB 13.1 09/24/2021    HCT 37.8 09/24/2021     09/24/2021       Renal:    Lab Results   Component Value Date     09/24/2021    K 3.5 09/24/2021     09/24/2021    CO2 26 09/24/2021    BUN 7 09/24/2021    CREATININE 0.7 09/24/2021    CALCIUM 8.5 09/24/2021         Significant Diagnostic Studies    Radiology:   No orders to display          Consults:     IP CONSULT TO PRIMARY CARE PROVIDER  IP CONSULT TO HOSPITALIST  IP CONSULT TO DIABETES EDUCATOR  IP CONSULT TO CASE MANAGEMENT  IP CONSULT TO DIETITIAN    Disposition: Home    Condition at Discharge: Stable    Discharge Instructions/Follow-up:  Exercise, Diet control and weight loss  Schedule an appointment with an endocrinologist  Monitor blood glucose.  IF morning Blood glucose continues to be >110 or blood glucose breakfast)  Qty: 30 capsule, Refills: 1    Associated Diagnoses: Non-intractable vomiting with nausea, unspecified vomiting type; Epigastric abdominal pain      ondansetron (ZOFRAN) 4 MG tablet Take 1 tablet by mouth daily as needed for Nausea or Vomiting  Qty: 30 tablet, Refills: 0    Associated Diagnoses: Non-intractable vomiting with nausea, unspecified vomiting type; Epigastric abdominal pain      buPROPion (WELLBUTRIN XL) 150 MG extended release tablet TAKE ONE TABLET BY MOUTH EVERY MORNING  Qty: 30 tablet, Refills: 2      atenolol (TENORMIN) 25 MG tablet TAKE ONE TABLET BY MOUTH DAILY  Qty: 30 tablet, Refills: 2    Associated Diagnoses: Chronic daily headache; Chronic headache with new features      lisinopril (PRINIVIL;ZESTRIL) 10 MG tablet Take 1 tablet by mouth daily  Qty: 30 tablet, Refills: 5    Associated Diagnoses: Chronic daily headache; Essential hypertension      fluticasone (FLONASE) 50 MCG/ACT nasal spray 2 sprays by Each Nostril route daily  Qty: 1 Bottle, Refills: 5    Associated Diagnoses: Allergic rhinitis due to animal hair and dander      tretinoin (RETIN-A) 0.025 % cream Apply topically nightly. Qty: 45 g, Refills: 1    Associated Diagnoses: Acne vulgaris      aspirin-acetaminophen-caffeine (EXCEDRIN MIGRAINE) 250-250-65 MG per tablet Take 1 tablet by mouth every 6 hours as needed for Headaches  Qty: 90 tablet, Refills: 3    Associated Diagnoses: Chronic daily headache             Time Spent on discharge is more than 30 minutes in the examination, evaluation, counseling and review of medications and discharge plan. Signed:    Govind Troncoso MD   09/25/21    Thank you Lynnette Holguin MD for the opportunity to be involved in this patient's care. If you have any questions or concerns please feel free to contact me at [phone].

## 2021-09-25 VITALS
RESPIRATION RATE: 16 BRPM | HEIGHT: 70 IN | TEMPERATURE: 98.8 F | WEIGHT: 230.6 LBS | OXYGEN SATURATION: 98 % | SYSTOLIC BLOOD PRESSURE: 145 MMHG | BODY MASS INDEX: 33.01 KG/M2 | DIASTOLIC BLOOD PRESSURE: 87 MMHG | HEART RATE: 55 BPM

## 2021-09-25 LAB
ANION GAP SERPL CALCULATED.3IONS-SCNC: 12 MMOL/L (ref 3–16)
BUN BLDV-MCNC: 11 MG/DL (ref 7–20)
CALCIUM SERPL-MCNC: 9.3 MG/DL (ref 8.3–10.6)
CHLORIDE BLD-SCNC: 102 MMOL/L (ref 99–110)
CO2: 26 MMOL/L (ref 21–32)
CREAT SERPL-MCNC: 0.7 MG/DL (ref 0.9–1.3)
GFR AFRICAN AMERICAN: >60
GFR NON-AFRICAN AMERICAN: >60
GLUCOSE BLD-MCNC: 178 MG/DL (ref 70–99)
GLUCOSE BLD-MCNC: 194 MG/DL (ref 70–99)
GLUCOSE BLD-MCNC: 218 MG/DL (ref 70–99)
HCT VFR BLD CALC: 40.9 % (ref 40.5–52.5)
HEMOGLOBIN: 14.2 G/DL (ref 13.5–17.5)
MCH RBC QN AUTO: 31.9 PG (ref 26–34)
MCHC RBC AUTO-ENTMCNC: 34.7 G/DL (ref 31–36)
MCV RBC AUTO: 92.1 FL (ref 80–100)
PDW BLD-RTO: 13 % (ref 12.4–15.4)
PERFORMED ON: ABNORMAL
PERFORMED ON: ABNORMAL
PLATELET # BLD: 221 K/UL (ref 135–450)
PMV BLD AUTO: 10.2 FL (ref 5–10.5)
POTASSIUM REFLEX MAGNESIUM: 3.6 MMOL/L (ref 3.5–5.1)
RBC # BLD: 4.44 M/UL (ref 4.2–5.9)
SODIUM BLD-SCNC: 140 MMOL/L (ref 136–145)
WBC # BLD: 6.7 K/UL (ref 4–11)

## 2021-09-25 PROCEDURE — 36415 COLL VENOUS BLD VENIPUNCTURE: CPT

## 2021-09-25 PROCEDURE — 85027 COMPLETE CBC AUTOMATED: CPT

## 2021-09-25 PROCEDURE — 6370000000 HC RX 637 (ALT 250 FOR IP): Performed by: INTERNAL MEDICINE

## 2021-09-25 PROCEDURE — 80048 BASIC METABOLIC PNL TOTAL CA: CPT

## 2021-09-25 RX ORDER — INSULIN GLARGINE 100 [IU]/ML
25 INJECTION, SOLUTION SUBCUTANEOUS 2 TIMES DAILY
Qty: 5 PEN | Refills: 3 | Status: SHIPPED | OUTPATIENT
Start: 2021-09-25 | End: 2021-11-15 | Stop reason: DRUGHIGH

## 2021-09-25 RX ORDER — GLUCOSAMINE HCL/CHONDROITIN SU 500-400 MG
CAPSULE ORAL
Qty: 100 STRIP | Refills: 3 | Status: SHIPPED | OUTPATIENT
Start: 2021-09-25 | End: 2021-10-14 | Stop reason: SDUPTHER

## 2021-09-25 RX ORDER — PEN NEEDLE, DIABETIC 31 G X1/4"
1 NEEDLE, DISPOSABLE MISCELLANEOUS
Qty: 100 EACH | Refills: 3 | Status: SHIPPED | OUTPATIENT
Start: 2021-09-25

## 2021-09-25 RX ORDER — LANCETS 28 GAUGE
1 EACH MISCELLANEOUS
Qty: 100 EACH | Refills: 3 | Status: SHIPPED | OUTPATIENT
Start: 2021-09-25 | End: 2021-10-15

## 2021-09-25 RX ORDER — ATORVASTATIN CALCIUM 80 MG/1
80 TABLET, FILM COATED ORAL NIGHTLY
Qty: 30 TABLET | Refills: 3 | Status: SHIPPED | OUTPATIENT
Start: 2021-09-25

## 2021-09-25 RX ORDER — INSULIN LISPRO 100 [IU]/ML
INJECTION, SOLUTION INTRAVENOUS; SUBCUTANEOUS
Qty: 5 PEN | Refills: 2 | Status: SHIPPED | OUTPATIENT
Start: 2021-09-25 | End: 2021-09-30 | Stop reason: DRUGHIGH

## 2021-09-25 RX ORDER — BLOOD-GLUCOSE METER
1 KIT MISCELLANEOUS
Qty: 1 KIT | Refills: 0 | Status: SHIPPED | OUTPATIENT
Start: 2021-09-25 | End: 2021-10-15

## 2021-09-25 RX ADMIN — LISINOPRIL 10 MG: 10 TABLET ORAL at 09:41

## 2021-09-25 RX ADMIN — METFORMIN HYDROCHLORIDE 500 MG: 500 TABLET ORAL at 09:41

## 2021-09-25 RX ADMIN — ATENOLOL 25 MG: 25 TABLET ORAL at 09:50

## 2021-09-25 RX ADMIN — ESCITALOPRAM OXALATE 20 MG: 20 TABLET ORAL at 09:41

## 2021-09-25 RX ADMIN — INSULIN LISPRO 5 UNITS: 100 INJECTION, SOLUTION INTRAVENOUS; SUBCUTANEOUS at 09:28

## 2021-09-25 RX ADMIN — INSULIN LISPRO 3 UNITS: 100 INJECTION, SOLUTION INTRAVENOUS; SUBCUTANEOUS at 09:28

## 2021-09-25 RX ADMIN — FAMOTIDINE 20 MG: 20 TABLET, FILM COATED ORAL at 09:40

## 2021-09-25 RX ADMIN — BUPROPION HYDROCHLORIDE 150 MG: 150 TABLET, EXTENDED RELEASE ORAL at 09:40

## 2021-09-25 ASSESSMENT — PAIN SCALES - GENERAL
PAINLEVEL_OUTOF10: 0

## 2021-09-25 NOTE — PROGRESS NOTES
Patient's blood glucose this am was 194. Patient was visited by dietitian last night. Patient was accepting of treatment and education given. Patient denies any pain or discomfort. Looking forward to discharge.

## 2021-09-25 NOTE — PLAN OF CARE
Problem: Metabolic:  Goal: Ability to maintain appropriate glucose levels will improve  Description: Ability to maintain appropriate glucose levels will improve  Outcome: Ongoing  Note: Continuing to educate patient of the importance of diet, insulin injection, and demonstration.

## 2021-09-25 NOTE — PROGRESS NOTES
Pt discharged home with medications provided via inpatient pharmacy. Pt verbalized understanding of follow up appointments and self care related to diabetes. Pt demonstrated proper application of insulin pen and use of sliding scale.

## 2021-09-25 NOTE — CARE COORDINATION
Case Management            Discharge Note                    Date / Time of Note: 9/25/2021 9:37 AM                  Discharge Note Completed by: TONY Singh    Patient Name: Crescencio Caputo   YOB: 1995  Diagnosis: Diabetes mellitus, new onset [facility]) [E11.9]   Date / Time: 9/22/2021  9:55 AM    Current PCP: Nacho Mcmahan MD  Clinic patient: No    Hospitalization in the last 30 days: No    Advance Directives:  Code Status: Full Code  [place] DNR form completed and on chart: Not Indicated    Financial:  Payor: [employer] / Plan: Faustina Waldrop / Product Type: *No Product type* /      Pharmacy:    [facility] [address], [address] [place] [address]  Phone: [phone] Fax: [phone]      Assistance purchasing medications?: Potential Assistance Purchasing Medications: No  Assistance provided by Case Management: None at this time    Does patient want to participate in local refill/ meds to beds program?: No    Meds To Beds General Rules:  1. Can ONLY be done Monday- Friday between 8:30am-5pm  2. Prescription(s) must be in pharmacy by 3pm to be filled same day  3. Copy of patient's insurance/ prescription drug card and patient face sheet must be sent along with the prescription(s)  4. Cost of Rx cannot be added to hospital bill. If financial assistance is needed, please contact unit  or ;  or  CANNOT provide pharmacy voucher for patients co-pays  5.  Patients can then  the prescription on their way out of the hospital at discharge, or pharmacy can deliver to the bedside if staff is available. (payment due at time of pick-up or delivery - cash, check, or card accepted)     Able to afford home medications/ co-pay Patient has been experiencing tingling in her feet after taking topirimate    Advised to decrease to once a day    Will observe    Stop medication if it gets worse costs: Yes    ADLS:  Current PT AM-PAC Score:   /24  Current OT AM-PAC Score:   /24      Discharge Disposition: Home    LOC at discharge: Not Applicable  OLY Completed: Not Indicated    Notification completed in HENS/PAS?:  Not Applicable    IMM Completed:   Not Indicated    Transportation:  Transportation Plan for discharge: family   Mode of Transport: Private Car    Home Care:  Home Care ordered at discharge: Not Indicated    Additional CM Notes:   SW rounded on this date. Patient to discharge home today. Patient aware of new DM diagnosis. Patient to follow up with PCP regarding management of medications and needs. No SW needs at this time. Patient already has medication     The Plan for Transition of Care is related to the following treatment goals Diabetes mellitus, new onset ([facility] [address] [E11.9]      The Patient and/or patient representative  was provided with a choice of provider and agrees with the discharge plan Not Indicated    Freedom of choice list was provided with basic dialogue that supports the patient's individualized plan of care/goals and shares the quality data associated with the providers.  Not Indicated    Care Transitions patient: Yes    TONY Quintana  [facility]   Case Management Department  Ph: [phone]

## 2021-09-30 ENCOUNTER — OFFICE VISIT (OUTPATIENT)
Dept: INTERNAL MEDICINE CLINIC | Age: 26
End: 2021-09-30
Payer: MEDICAID

## 2021-09-30 VITALS
DIASTOLIC BLOOD PRESSURE: 80 MMHG | OXYGEN SATURATION: 98 % | RESPIRATION RATE: 14 BRPM | SYSTOLIC BLOOD PRESSURE: 120 MMHG | HEART RATE: 74 BPM

## 2021-09-30 DIAGNOSIS — E11.9 DIABETES MELLITUS, NEW ONSET (HCC): Primary | ICD-10-CM

## 2021-09-30 DIAGNOSIS — Z23 NEED FOR VACCINATION: ICD-10-CM

## 2021-09-30 PROCEDURE — G8417 CALC BMI ABV UP PARAM F/U: HCPCS | Performed by: INTERNAL MEDICINE

## 2021-09-30 PROCEDURE — 1036F TOBACCO NON-USER: CPT | Performed by: INTERNAL MEDICINE

## 2021-09-30 PROCEDURE — 99214 OFFICE O/P EST MOD 30 MIN: CPT | Performed by: INTERNAL MEDICINE

## 2021-09-30 PROCEDURE — G8427 DOCREV CUR MEDS BY ELIG CLIN: HCPCS | Performed by: INTERNAL MEDICINE

## 2021-09-30 PROCEDURE — 2022F DILAT RTA XM EVC RTNOPTHY: CPT | Performed by: INTERNAL MEDICINE

## 2021-09-30 PROCEDURE — 1111F DSCHRG MED/CURRENT MED MERGE: CPT | Performed by: INTERNAL MEDICINE

## 2021-09-30 PROCEDURE — 90471 IMMUNIZATION ADMIN: CPT | Performed by: INTERNAL MEDICINE

## 2021-09-30 PROCEDURE — 3046F HEMOGLOBIN A1C LEVEL >9.0%: CPT | Performed by: INTERNAL MEDICINE

## 2021-09-30 PROCEDURE — 90732 PPSV23 VACC 2 YRS+ SUBQ/IM: CPT | Performed by: INTERNAL MEDICINE

## 2021-09-30 RX ORDER — METFORMIN HYDROCHLORIDE 500 MG/1
2000 TABLET, EXTENDED RELEASE ORAL
Qty: 120 TABLET | Refills: 5 | Status: SHIPPED | OUTPATIENT
Start: 2021-09-30 | End: 2022-05-09

## 2021-09-30 RX ORDER — INSULIN LISPRO 100 [IU]/ML
INJECTION, SOLUTION INTRAVENOUS; SUBCUTANEOUS
Qty: 5 PEN | Refills: 2 | Status: SHIPPED
Start: 2021-09-30 | End: 2021-11-15 | Stop reason: DRUGHIGH

## 2021-09-30 ASSESSMENT — ENCOUNTER SYMPTOMS
WHEEZING: 0
SHORTNESS OF BREATH: 0
COUGH: 0
VOMITING: 0
NAUSEA: 0

## 2021-09-30 NOTE — PROGRESS NOTES
Carina Anderson (:  1995) is a 23 y.o. male,Established patient, here for evaluation of the following chief complaint(s):  Follow-Up from [facility] 21-21 New onset DM )      ASSESSMENT/PLAN:  1. Diabetes mellitus, new onset [facility])  Assessment & Plan:  Given age will check FAREED-65 Abs. Add metformin and titrate. Continue lantus 25 units bid and increase lispro to 12 units with meals plus sliding scale. Continue statin for now until he sees cardiology. Pneumovax today. UTD on eye exam.   Orders:  -     metFORMIN (GLUCOPHAGE-XR) 500 MG extended release tablet; Take 4 tablets by mouth daily (with breakfast), Disp-120 tablet, R-5Normal  -     GLUTAMIC ACID DECARBOXYLASE; Future  -     PNEUMOVAX 23 subcutaneous/IM (Pneumococcal polysaccharide vaccine 23-valent >= 3yo)  -     Microalbumin / Creatinine Urine Ratio; Future  2. Need for vaccination  -     PNEUMOVAX 23 subcutaneous/IM (Pneumococcal polysaccharide vaccine 23-valent >= 3yo)      Return in about 2 weeks (around 10/14/2021) for DM2 - flex okay. SUBJECTIVE/OBJECTIVE:  HPI    New onset of diabetes. Sent to hospital by me with blood sugar in the 800s. He was admitted for new onset of diabetes. He was started on insulin. He has been taking 25 units twice daily of Lantus and was initially just on a sliding scale of mealtime but I had had him start 8 units with meals plus a sliding scale when I talk to him 2 or 3 days ago. He has not started on metformin as he did not get it from the pharmacy. He was started on atorvastatin 80 mg daily which she is tolerating.     He has not vomited since he left the hospital    Lab Results   Component Value Date    CHOL 234 (H) 2021    CHOL 242 (H) 2021     Lab Results   Component Value Date    TRIG 623 (H) 2021    TRIG 314 (H) 2021     Lab Results   Component Value Date    HDL 27 (L) 2021    HDL 33 (L) 2021     Lab Results   Component Value Date    LDLCALC see below tablet 0    ondansetron (ZOFRAN) 4 MG tablet Take 1 tablet by mouth daily as needed for Nausea or Vomiting 30 tablet 0    buPROPion (WELLBUTRIN XL) 150 MG extended release tablet TAKE ONE TABLET BY MOUTH EVERY MORNING 30 tablet 2    atenolol (TENORMIN) 25 MG tablet TAKE ONE TABLET BY MOUTH DAILY 30 tablet 2    lisinopril (PRINIVIL;ZESTRIL) 10 MG tablet Take 1 tablet by mouth daily 30 tablet 5    fluticasone (FLONASE) 50 MCG/ACT nasal spray 2 sprays by Each Nostril route daily 1 Bottle 5    tretinoin (RETIN-A) 0.025 % cream Apply topically nightly. 45 g 1    aspirin-acetaminophen-caffeine (EXCEDRIN MIGRAINE) 162-267-56 MG per tablet Take 1 tablet by mouth every 6 hours as needed for Headaches 90 tablet 3     No current facility-administered medications on file prior to visit. Vitals:    09/30/21 1233   BP: 120/80   Pulse: 74   Resp: 14   SpO2: 98%     Physical Exam  Constitutional:       General: He is not in acute distress. Appearance: Normal appearance. HENT:      Head: Normocephalic and atraumatic. Right Ear: External ear normal.      Left Ear: External ear normal.      Nose: Nose normal.      Mouth/Throat:      Mouth: Mucous membranes are moist.      Pharynx: Oropharynx is clear. Eyes:      General: No scleral icterus. Conjunctiva/sclera: Conjunctivae normal.   Cardiovascular:      Rate and Rhythm: Normal rate and regular rhythm. Pulses: Normal pulses. Heart sounds: Normal heart sounds. No murmur heard. No friction rub. No gallop. Pulmonary:      Effort: Pulmonary effort is normal.      Breath sounds: Normal breath sounds. No wheezing, rhonchi or rales. Abdominal:      General: Abdomen is flat. Bowel sounds are normal.      Palpations: Abdomen is soft. Musculoskeletal:         General: Normal range of motion. Cervical back: Normal range of motion and neck supple. Right lower leg: No edema. Left lower leg: No edema.    Skin:     General: Skin is warm and dry. Findings: No rash. Neurological:      General: No focal deficit present. Mental Status: He is alert. Mental status is at baseline. Coordination: Coordination normal.      Gait: Gait normal.   Psychiatric:         Mood and Affect: Mood normal.         Behavior: Behavior normal.           On this date 9/30/2021 I have spent 30 minutes reviewing previous notes, test results and face to face with the patient discussing the diagnosis and importance of compliance with the treatment plan as well as documenting on the day of the visit. An electronic signature was used to authenticate this note.     --Pablo King MD

## 2021-09-30 NOTE — ASSESSMENT & PLAN NOTE
Given age will check FAREED-65 Abs. Add metformin and titrate. Continue lantus 25 units bid and increase lispro to 12 units with meals plus sliding scale. Continue statin for now until he sees cardiology. Pneumovax today.  UTD on eye exam.

## 2021-10-14 ENCOUNTER — OFFICE VISIT (OUTPATIENT)
Dept: INTERNAL MEDICINE CLINIC | Age: 26
End: 2021-10-14
Payer: MEDICAID

## 2021-10-14 VITALS
HEART RATE: 83 BPM | SYSTOLIC BLOOD PRESSURE: 122 MMHG | OXYGEN SATURATION: 98 % | DIASTOLIC BLOOD PRESSURE: 80 MMHG | RESPIRATION RATE: 14 BRPM | WEIGHT: 231 LBS | BODY MASS INDEX: 33.15 KG/M2

## 2021-10-14 DIAGNOSIS — E11.9 DIABETES MELLITUS, NEW ONSET (HCC): Primary | ICD-10-CM

## 2021-10-14 DIAGNOSIS — R94.31 ABNORMAL EKG: ICD-10-CM

## 2021-10-14 DIAGNOSIS — R11.2 NON-INTRACTABLE VOMITING WITH NAUSEA, UNSPECIFIED VOMITING TYPE: ICD-10-CM

## 2021-10-14 DIAGNOSIS — R73.9 HYPERGLYCEMIA: ICD-10-CM

## 2021-10-14 DIAGNOSIS — R10.13 EPIGASTRIC ABDOMINAL PAIN: ICD-10-CM

## 2021-10-14 DIAGNOSIS — E16.2 HYPOGLYCEMIA: ICD-10-CM

## 2021-10-14 PROCEDURE — 3046F HEMOGLOBIN A1C LEVEL >9.0%: CPT | Performed by: INTERNAL MEDICINE

## 2021-10-14 PROCEDURE — 2022F DILAT RTA XM EVC RTNOPTHY: CPT | Performed by: INTERNAL MEDICINE

## 2021-10-14 PROCEDURE — G8482 FLU IMMUNIZE ORDER/ADMIN: HCPCS | Performed by: INTERNAL MEDICINE

## 2021-10-14 PROCEDURE — G8417 CALC BMI ABV UP PARAM F/U: HCPCS | Performed by: INTERNAL MEDICINE

## 2021-10-14 PROCEDURE — 93000 ELECTROCARDIOGRAM COMPLETE: CPT | Performed by: INTERNAL MEDICINE

## 2021-10-14 PROCEDURE — 99214 OFFICE O/P EST MOD 30 MIN: CPT | Performed by: INTERNAL MEDICINE

## 2021-10-14 PROCEDURE — G8427 DOCREV CUR MEDS BY ELIG CLIN: HCPCS | Performed by: INTERNAL MEDICINE

## 2021-10-14 PROCEDURE — 1036F TOBACCO NON-USER: CPT | Performed by: INTERNAL MEDICINE

## 2021-10-14 PROCEDURE — 1111F DSCHRG MED/CURRENT MED MERGE: CPT | Performed by: INTERNAL MEDICINE

## 2021-10-14 RX ORDER — GLUCOSAMINE HCL/CHONDROITIN SU 500-400 MG
CAPSULE ORAL
Qty: 200 STRIP | Refills: 5 | Status: SHIPPED | OUTPATIENT
Start: 2021-10-14 | End: 2021-10-15

## 2021-10-14 RX ORDER — ONDANSETRON 4 MG/1
4 TABLET, FILM COATED ORAL DAILY PRN
Qty: 30 TABLET | Refills: 0 | Status: SHIPPED | OUTPATIENT
Start: 2021-10-14 | End: 2021-12-03

## 2021-10-14 ASSESSMENT — ENCOUNTER SYMPTOMS
WHEEZING: 0
COUGH: 0
NAUSEA: 0
DIARRHEA: 0
ABDOMINAL PAIN: 0
VOMITING: 0
SHORTNESS OF BREATH: 0

## 2021-10-14 NOTE — PROGRESS NOTES
Andrew Gill (:  1995) is a 23 y.o. male,Established patient, here for evaluation of the following chief complaint(s):  Diabetes      ASSESSMENT/PLAN:  1. Diabetes mellitus, new onset [facility])  Assessment & Plan:  Continue continues to improve. Continue lantus 25units twice a day with lispro 12 units with meals. Will send rx for more strips. Will discuss with RNCC to help with issues obtaining supplies. Extra basaglar pen given to patient today given his pen malfunction at home. Orders:  -     blood glucose monitor strips; Test 5 times a day & as needed for symptoms of irregular blood glucose. Dispense sufficient amount for indicated testing frequency plus additional to accommodate PRN testing needs. May substitute brand based on insurance formulary, Disp-200 strip, R-5, Normal  2. Hyperglycemia  -     blood glucose monitor strips; Test 5 times a day & as needed for symptoms of irregular blood glucose. Dispense sufficient amount for indicated testing frequency plus additional to accommodate PRN testing needs. May substitute brand based on insurance formulary, Disp-200 strip, R-5, Normal  3. Hypoglycemia  -     blood glucose monitor strips; Test 5 times a day & as needed for symptoms of irregular blood glucose. Dispense sufficient amount for indicated testing frequency plus additional to accommodate PRN testing needs. May substitute brand based on insurance formulary, Disp-200 strip, R-5, Normal  4. Abnormal EKG  Asymptomatic but in new onset diabetic. Had Inferior TWI while was ill which have since resolved. He will be looking to have surgery in the coming months on his sinuses and so will need to see a cardiologist.   -     EKG 12 Lead  5. Non-intractable vomiting with nausea, unspecified vomiting type  -     ondansetron (ZOFRAN) 4 MG tablet; Take 1 tablet by mouth daily as needed for Nausea or Vomiting, Disp-30 tablet, R-0Normal  6.  Epigastric abdominal pain  -     ondansetron (ZOFRAN) 4 MG tablet; Take 1 tablet by mouth daily as needed for Nausea or Vomiting, Disp-30 tablet, R-0Normal      Return in about 4 weeks (around 2021). SUBJECTIVE/OBJECTIVE:  HPI    Overall he is doing well  He is out of test strips and having issues with getting more. Told needed prior auth but unclear whether has wrong brand or just because not enough. Prior to that BG had been mostly in the 100s. He has had no symptomatic hypoglycemia. Tolerating 500mg of metformin without issue. He is having no chest pain or SOB. He had some dynamic EKG changes with TWI inferiorly that have since resolved. He has not seen cardiology yet because was in hospital. Was in setting of stress and dehydration. Review of Systems   Constitutional: Negative for chills, fatigue and fever. Respiratory: Negative for cough, shortness of breath and wheezing. Cardiovascular: Negative for chest pain, palpitations and leg swelling. Gastrointestinal: Negative for abdominal pain, diarrhea, nausea and vomiting. Current Outpatient Medications on File Prior to Visit   Medication Sig Dispense Refill    metFORMIN (GLUCOPHAGE-XR) 500 MG extended release tablet Take 4 tablets by mouth daily (with breakfast) 120 tablet 5    insulin lispro, 1 Unit Dial, (HUMALOG KWIKPEN) 100 UNIT/ML SOPN 12 units with meals plus sliding scale:  No extra; 140-199 No extra; 200-249 2 Units; 250-299 4 Units; 300-349 6 Units; 350-400 8 Units;  Over 400 10 Units 5 pen 2    insulin glargine (LANTUS SOLOSTAR) 100 UNIT/ML injection pen Inject 25 Units into the skin 2 times daily 5 pen 3    FreeStyle Lancets MISC 1 each by Does not apply route 4 times daily (before meals and nightly) May substitute brand based on insurance formulary 100 each 3    glucose monitoring (FREESTYLE FREEDOM) kit 1 kit by Does not apply route 4 times daily (before meals and nightly) May substitute brand based on insurance formulary 1 kit 0    Insulin Pen Needle (Gen Lance PEN NEEDLES) 31G X 6 MM MISC 1 each by Does not apply route 5 times daily May substitute brand based on insurance formulary 100 each 3    atorvastatin (LIPITOR) 80 MG tablet Take 1 tablet by mouth nightly 30 tablet 3    escitalopram (LEXAPRO) 20 MG tablet TAKE ONE TABLET BY MOUTH DAILY 90 tablet 0    ondansetron (ZOFRAN) 4 MG tablet Take 1 tablet by mouth daily as needed for Nausea or Vomiting 30 tablet 0    buPROPion (WELLBUTRIN XL) 150 MG extended release tablet TAKE ONE TABLET BY MOUTH EVERY MORNING 30 tablet 2    atenolol (TENORMIN) 25 MG tablet TAKE ONE TABLET BY MOUTH DAILY 30 tablet 2    lisinopril (PRINIVIL;ZESTRIL) 10 MG tablet Take 1 tablet by mouth daily 30 tablet 5    fluticasone (FLONASE) 50 MCG/ACT nasal spray 2 sprays by Each Nostril route daily 1 Bottle 5    tretinoin (RETIN-A) 0.025 % cream Apply topically nightly. 45 g 1    aspirin-acetaminophen-caffeine (EXCEDRIN MIGRAINE) 589-595-37 MG per tablet Take 1 tablet by mouth every 6 hours as needed for Headaches 90 tablet 3     No current facility-administered medications on file prior to visit. Vitals:    10/14/21 1523   BP: 122/80   Pulse: 83   Resp: 14   SpO2: 98%     Physical Exam  Constitutional:       General: He is not in acute distress. Appearance: Normal appearance. HENT:      Head: Normocephalic and atraumatic. Right Ear: External ear normal.      Left Ear: External ear normal.      Nose: Nose normal.      Mouth/Throat:      Mouth: Mucous membranes are moist.      Pharynx: Oropharynx is clear. Eyes:      General: No scleral icterus. Conjunctiva/sclera: Conjunctivae normal.   Cardiovascular:      Rate and Rhythm: Normal rate and regular rhythm. Pulses: Normal pulses. Heart sounds: Normal heart sounds. No murmur heard. No friction rub. No gallop. Pulmonary:      Effort: Pulmonary effort is normal.      Breath sounds: Normal breath sounds. No wheezing, rhonchi or rales.    Abdominal:      General: Abdomen is flat. Bowel sounds are normal.      Palpations: Abdomen is soft. Musculoskeletal:         General: Normal range of motion. Cervical back: Normal range of motion and neck supple. Right lower leg: No edema. Left lower leg: No edema. Skin:     General: Skin is warm and dry. Findings: No rash. Neurological:      General: No focal deficit present. Mental Status: He is alert. Mental status is at baseline. Coordination: Coordination normal.      Gait: Gait normal.   Psychiatric:         Mood and Affect: Mood normal.         Behavior: Behavior normal.           On this date 10/14/2021 I have spent 30 minutes reviewing previous notes, test results and face to face with the patient discussing the diagnosis and importance of compliance with the treatment plan as well as documenting on the day of the visit. An electronic signature was used to authenticate this note.     --Jessica Dang MD

## 2021-10-14 NOTE — ASSESSMENT & PLAN NOTE
Continue continues to improve. Continue lantus 25units twice a day with lispro 12 units with meals. Will send rx for more strips. Will discuss with RNCC to help with issues obtaining supplies. Extra basaglar pen given to patient today given his pen malfunction at home.

## 2021-10-15 ENCOUNTER — CARE COORDINATION (OUTPATIENT)
Dept: CARE COORDINATION | Age: 26
End: 2021-10-15

## 2021-10-15 DIAGNOSIS — E11.9 DIABETES MELLITUS, NEW ONSET (HCC): Primary | ICD-10-CM

## 2021-10-15 RX ORDER — GLUCOSAMINE HCL/CHONDROITIN SU 500-400 MG
125 CAPSULE ORAL
COMMUNITY

## 2021-10-15 RX ORDER — LANCETS 33 GAUGE
EACH MISCELLANEOUS
COMMUNITY

## 2021-10-15 NOTE — CARE COORDINATION
Ambulatory Care Coordination Note  10/15/2021  CM Risk Score: 1  Charlson 10 Year Mortality Risk Score: 4%     ACC: Delano Cummins, RN    Summary Note: Referral from PCP  New dx of DM after hospitalization 9/21  Working closely w pcp however running out of test strips and insurance will not cover 5 daily plus prn for sx. Call to pharmacy, sending over cover my meds PA form to office to complete  As well contacted [employer], if PA doesn't work , PCP can appeal by calling [phone] option 0, option 1. Patient does not have an assigned CM and is not eligible as has not had repeated hospitalizations per [employer] intake rep  Contacted [employer] CC liaison re working w [employer] for approval, ideally a free style dusty or dex com5 would be beneficial but he cannot indicate testing 3-4 times daily due to insurance not approving strips. Plan     will cont outreach          Prior to Admission medications    Medication Sig Start Date End Date Taking? Authorizing Provider   blood glucose monitor strips Test 5 times a day & as needed for symptoms of irregular blood glucose. Dispense sufficient amount for indicated testing frequency plus additional to accommodate PRN testing needs. May substitute brand based on insurance formulary 10/14/21   Cb Chong MD   ondansetron [facility]) 4 MG tablet Take 1 tablet by mouth daily as needed for Nausea or Vomiting 10/14/21   Cb Chong MD   metFORMIN (GLUCOPHAGE-XR) 500 MG extended release tablet Take 4 tablets by mouth daily (with breakfast) 9/30/21   Cb Chong MD   insulin lispro, 1 Unit Dial, (HUMALOG KWIKPEN) 100 UNIT/ML SOPN 12 units with meals plus sliding scale:  No extra; 140-199 No extra; 200-249 2 Units; 250-299 4 Units; 300-349 6 Units; 350-400 8 Units;  Over 400 10 Units 9/30/21   Cb Chong MD   insulin glargine (LANTUS SOLOSTAR) 100 UNIT/ML injection pen Inject 25 Units into the skin 2 times daily 9/25/21   MD Nelson HernandezStyle Lancets MISC 1 each by Does not apply route 4 times daily (before meals and nightly) May substitute brand based on insurance formulary 9/25/21   Ondina Maher MD   glucose monitoring (FREESTYLE FREEDOM) kit 1 kit by Does not apply route 4 times daily (before meals and nightly) May substitute brand based on insurance formulary 9/25/21   Ondina Maher MD   Insulin Pen Needle ([employer] PEN NEEDLES) 31G X 6 MM MISC 1 each by Does not apply route 5 times daily May substitute brand based on insurance formulary 9/25/21   Ondina Maher MD   atorvastatin (LIPITOR) 80 MG tablet Take 1 tablet by mouth nightly 9/25/21   Ondina Maher MD   escitalopram (LEXAPRO) 20 MG tablet TAKE ONE TABLET BY MOUTH DAILY 8/30/21   Jamie Carlisle MD   buPROPion (WELLBUTRIN XL) 150 MG extended release tablet TAKE ONE TABLET BY MOUTH EVERY MORNING 7/27/21   Gabi Cedillo APRN - CNP   atenolol (TENORMIN) 25 MG tablet TAKE ONE TABLET BY MOUTH DAILY 7/12/21   Jamie Carlisle MD   lisinopril (PRINIVIL;ZESTRIL) 10 MG tablet Take 1 tablet by mouth daily 5/18/21   Jamie Carlisle MD   fluticasone (FLONASE) 50 MCG/ACT nasal spray 2 sprays by Each Nostril route daily 3/25/21   Jamie Carlisle MD   tretinoin (RETIN-A) 0.025 % cream Apply topically nightly.  12/31/20   Jamie Carlisle MD   aspirin-acetaminophen-caffeine (EXCEDRIN MIGRAINE) 823-933-57 MG per tablet Take 1 tablet by mouth every 6 hours as needed for Headaches 10/20/20   Jamie Carlisle MD       Future Appointments   Date Time Provider Maria Esther Najera   11/15/2021  3:30 PM MD BLAYNE Meade

## 2021-10-15 NOTE — CARE COORDINATION
Ambulatory Care Coordination Note  10/15/2021  CM Risk Score: 1  Charlson 10 Year Mortality Risk Score: 4%     ACC: Jim Garcia, RN    Summary Note: Return call from patient  Introduced ACM/ role      Discussed testing routine   not currently employed  hosp in sept at [facility] and One Touch Verio Flex and 100 test strips were provided   He has run out of strips and has had trouble getting more. Testing in AM , having breakfast along w mealtime plus SS insulin and basal insulin  Tests at lunch- mealtime plus SS  Tests at dinner-mealtime plus SS   Tests bedtime- basal insulin    Met w RD/ CDE at hospital   would consider meeting w RD/ CDE at office down the road. BG improving   a1c 14 at hosp    PLan  Will contact pharm to see if can get Verio flex strips approved for 4 times daily. Cont outreach  pharmacy med mgt referral for diabetes and help w voucher and approval of CGM                Prior to Admission medications    Medication Sig Start Date End Date Taking? Authorizing Provider   blood glucose monitor strips Test 5 times a day & as needed for symptoms of irregular blood glucose. Dispense sufficient amount for indicated testing frequency plus additional to accommodate PRN testing needs. May substitute brand based on insurance formulary 10/14/21  Yes Mere Waldron MD   [facility]) 4 MG tablet Take 1 tablet by mouth daily as needed for Nausea or Vomiting 10/14/21  Yes Mere Waldron MD   metFORMIN (GLUCOPHAGE-XR) 500 MG extended release tablet Take 4 tablets by mouth daily (with breakfast) 9/30/21  Yes Mere Waldron MD   insulin lispro, 1 Unit Dial, (HUMALOG KWIKPEN) 100 UNIT/ML SOPN 12 units with meals plus sliding scale:  No extra; 140-199 No extra; 200-249 2 Units; 250-299 4 Units; 300-349 6 Units; 350-400 8 Units;  Over 400 10 Units 9/30/21  Yes Mere Waldron MD   insulin glargine (LANTUS SOLOSTAR) 100 UNIT/ML injection pen Inject 25 Units into the skin 2 times daily 9/25/21  Yes Jose Addison MD   FreeStyle Lancets MISC 1 each by Does not apply route 4 times daily (before meals and nightly) May substitute brand based on insurance formulary 9/25/21  Yes Jose Addison MD   glucose monitoring (FREESTYLE FREEDOM) kit 1 kit by Does not apply route 4 times daily (before meals and nightly) May substitute brand based on insurance formulary 9/25/21  Yes Jose Addison MD   Insulin Pen Needle ([employer] PEN NEEDLES) 31G X 6 MM MISC 1 each by Does not apply route 5 times daily May substitute brand based on insurance formulary 9/25/21  Yes Jose Addison MD   atorvastatin (LIPITOR) 80 MG tablet Take 1 tablet by mouth nightly 9/25/21  Yes Jose Addison MD   escitalopram (LEXAPRO) 20 MG tablet TAKE ONE TABLET BY MOUTH DAILY 8/30/21  Yes Leroy Zheng MD   buPROPion (WELLBUTRIN XL) 150 MG extended release tablet TAKE ONE TABLET BY MOUTH EVERY MORNING 7/27/21  Yes FRAN Mcgill CNP   atenolol (TENORMIN) 25 MG tablet TAKE ONE TABLET BY MOUTH DAILY 7/12/21  Yes Lreoy Zheng MD   lisinopril (PRINIVIL;ZESTRIL) 10 MG tablet Take 1 tablet by mouth daily 5/18/21  Yes Leroy Zheng MD   fluticasone (FLONASE) 50 MCG/ACT nasal spray 2 sprays by Each Nostril route daily 3/25/21  Yes Leroy Zheng MD   tretinoin (RETIN-A) 0.025 % cream Apply topically nightly. 12/31/20  Yes Leroy Zheng MD   aspirin-acetaminophen-caffeine (EXCEDRIN MIGRAINE) 351-198-33 MG per tablet Take 1 tablet by mouth every 6 hours as needed for Headaches 10/20/20  Yes Leroy Zheng MD       Future Appointments   Date Time Provider Maria Esther Dania   11/15/2021  3:30 PM MD BLAYNE Ang - DYNA[facility]JOT     Diabetes Assessment    Medic Alert ID: No  Meal Planning: Plate Method   How often do you test your blood sugar?: Meals, Bedtime   Do you have barriers with adherence to non-pharmacologic self-management interventions?  (Nutrition/Exercise/Self-Monitoring): Yes   Have you ever had to go to the ED for symptoms of low blood sugar?: No       No patient-reported symptoms       and   General Assessment    Do you have any symptoms that are causing concern?: No     '  Call to pharmacy to provide name of meter that was supplied by [facility] - One Touch Verio Flex  They were able to run it though for 4 times/ day testing    Made patient aware will be available after 4 today

## 2021-10-18 ENCOUNTER — CARE COORDINATION (OUTPATIENT)
Dept: CARE COORDINATION | Age: 26
End: 2021-10-18

## 2021-10-18 NOTE — CARE COORDINATION
Ambulatory Care Coordination Note  10/18/2021  CM Risk Score: 1  Charlson 10 Year Mortality Risk Score: 4%     ACC: Jason Scott, RN    Summary Note:   Over weekend  Had 2 hypoglycemia episodes with some shakiness  56, 66 mid day. Reports he had eaten  Taking medications as prescribed. The rest of his numbers are below 100. Last reading 94. Picked up strips,has enough for testing 4 times daily    Plan  He will send readings for week via my chart          Care Coordination Interventions    Program Enrollment: Complex Care  Referral from Primary Care Provider: Yes  Suggested Interventions and Community Resources         Goals Addressed    None         Prior to Admission medications    Medication Sig Start Date End Date Taking? Authorizing Provider   BLOOD GLUCOSE MONITOR 1 Device One Touch Verio Flex meter    Historical Provider, MD   blood glucose monitor strips 125 strips by Other route Test 4 times a day & as needed for symptoms of irregular blood glucose. Dispense sufficient amount for indicated testing frequency plus additional to accommodate PRN testing needs     verio flex testing strips. Historical Provider, MD   Lancets Micro Thin 33G MISC by Does not apply route One touch Verio Flex    Historical Provider, MD   ondansetron (ZOFRAN) 4 MG tablet Take 1 tablet by mouth daily as needed for Nausea or Vomiting 10/14/21   Veena Swift MD   metFORMIN (GLUCOPHAGE-XR) 500 MG extended release tablet Take 4 tablets by mouth daily (with breakfast)  Patient taking differently: Take 2,000 mg by mouth Daily with supper  9/30/21   Veena Swift MD   insulin lispro, 1 Unit Dial, (HUMALOG KWIKPEN) 100 UNIT/ML SOPN 12 units with meals plus sliding scale:  No extra; 140-199 No extra; 200-249 2 Units; 250-299 4 Units; 300-349 6 Units; 350-400 8 Units;  Over 400 10 Units 9/30/21   Veena Swift MD   insulin glargine (LANTUS SOLOSTAR) 100 UNIT/ML injection pen Inject 25 Units into the skin 2 times daily 9/25/21   Jerry Dubose MD   Insulin Pen Needle ([employer] PEN NEEDLES) 31G X 6 MM MISC 1 each by Does not apply route 5 times daily May substitute brand based on insurance formulary 9/25/21   Jerry Dubose MD   atorvastatin (LIPITOR) 80 MG tablet Take 1 tablet by mouth nightly 9/25/21   Jerry Dubose MD   escitalopram (LEXAPRO) 20 MG tablet TAKE ONE TABLET BY MOUTH DAILY 8/30/21   Alejandrina Vogel MD   buPROPion (WELLBUTRIN XL) 150 MG extended release tablet TAKE ONE TABLET BY MOUTH EVERY MORNING 7/27/21   FRAN Viera - CNP   atenolol (TENORMIN) 25 MG tablet TAKE ONE TABLET BY MOUTH DAILY 7/12/21   Alejandrina Vogel MD   lisinopril (PRINIVIL;ZESTRIL) 10 MG tablet Take 1 tablet by mouth daily 5/18/21   Alejandrina Vogel MD   fluticasone [facility]) 50 MCG/ACT nasal spray 2 sprays by Each Nostril route daily 3/25/21   Alejandrina Vogel MD   tretinoin (RETIN-A) 0.025 % cream Apply topically nightly. 12/31/20   Alejandrina Vogel MD   aspirin-acetaminophen-caffeine (EXCEDRIN MIGRAINE) 464-635-65 MG per tablet Take 1 tablet by mouth every 6 hours as needed for Headaches 10/20/20   Alejandrina Vogel MD       Future Appointments   Date Time Provider Maria Esther Najera   11/15/2021  3:30 PM MD BLAYNE Vilchis Cinlona - DYD      and   Diabetes Assessment    Medic Alert ID: No  Meal Planning: Plate Method   How often do you test your blood sugar?: Meals, Bedtime   Do you have barriers with adherence to non-pharmacologic self-management interventions?  (Nutrition/Exercise/Self-Monitoring): No   Have you ever had to go to the ED for symptoms of low blood sugar?: No       Blood Sugars less than 70   Do you have hyperglycemia symptoms?: No   Do you have hypoglycemia symptoms?: Yes   Frequency of Episodes: 2 Per: Week   Last Blood Sugar Value: 94   Blood Sugar Monitoring Regimen: Before Meals, At Bedtime, Morning Fasting   Blood Sugar Trends: Steady Decrease      '

## 2021-10-18 NOTE — CARE COORDINATION
Spoke w patient re changes in regimen.   Voiced understanding    Will decrease basla to 20 units bid

## 2021-10-18 NOTE — TELEPHONE ENCOUNTER
LVM for patient with insulin instructions. Want him to decrease his long acting insulin to 20 units twice a day and keep his mealtime the same.

## 2021-10-20 ENCOUNTER — CARE COORDINATION (OUTPATIENT)
Dept: CARE COORDINATION | Age: 26
End: 2021-10-20

## 2021-10-20 NOTE — CARE COORDINATION
Ambulatory Care Coordination Note  10/20/2021  CM Risk Score: 1  Charlson 10 Year Mortality Risk Score: 4%     ACC: Cami Sanon, RN    Summary Note:   My chart message from patient re continuing to get error message w glucometer  Went through 6 or so strips w error message over last day    Call to office to see if we have verio flex. - we do not     call to [facility], they would like him to come to pharmacy to trouble shoot meter error messages     made pt aware via my chart    plan    Will continue outreach          Care Coordination Interventions    Program Enrollment: Complex Care  Referral from Primary Care Provider: Yes  Suggested Interventions and Community Resources         Goals Addressed    None         Prior to Admission medications    Medication Sig Start Date End Date Taking? Authorizing Provider   BLOOD GLUCOSE MONITOR 1 Device One Touch Verio Flex meter    Historical Provider, MD   blood glucose monitor strips 125 strips by Other route Test 4 times a day & as needed for symptoms of irregular blood glucose. Dispense sufficient amount for indicated testing frequency plus additional to accommodate PRN testing needs     verio flex testing strips. Historical Provider, MD   Lancets Micro Thin 33G MISC by Does not apply route One touch Verio Flex    Historical Provider, MD   ondansetron (ZOFRAN) 4 MG tablet Take 1 tablet by mouth daily as needed for Nausea or Vomiting 10/14/21   Francisca Maher MD   metFORMIN (GLUCOPHAGE-XR) 500 MG extended release tablet Take 4 tablets by mouth daily (with breakfast)  Patient taking differently: Take 2,000 mg by mouth Daily with supper  9/30/21   Francisca Maher MD   insulin lispro, 1 Unit Dial, (HUMALOG KWIKPEN) 100 UNIT/ML SOPN 12 units with meals plus sliding scale:  No extra; 140-199 No extra; 200-249 2 Units; 250-299 4 Units; 300-349 6 Units; 350-400 8 Units;  Over 400 10 Units 9/30/21   Francisca Maher MD   insulin glargine (LANTUS SOLOSTAR) 100 UNIT/ML injection pen Inject 25 Units into the skin 2 times daily  Patient taking differently: Inject 20 Units into the skin 2 times daily  9/25/21   Zamzam Feldman MD   Insulin Pen Needle ([employer] PEN NEEDLES) 31G X 6 MM MISC 1 each by Does not apply route 5 times daily May substitute brand based on insurance formulary 9/25/21   Zamzam Feldman MD   atorvastatin (LIPITOR) 80 MG tablet Take 1 tablet by mouth nightly 9/25/21   Zamzam Feldman MD   escitalopram (LEXAPRO) 20 MG tablet TAKE ONE TABLET BY MOUTH DAILY 8/30/21   Vandana Varela MD   buPROPion (WELLBUTRIN XL) 150 MG extended release tablet TAKE ONE TABLET BY MOUTH EVERY MORNING 7/27/21   FRAN Gallegos - CNP   atenolol (TENORMIN) 25 MG tablet TAKE ONE TABLET BY MOUTH DAILY 7/12/21   Vandana Varela MD   lisinopril (PRINIVIL;ZESTRIL) 10 MG tablet Take 1 tablet by mouth daily 5/18/21   Vandana Varela MD   fluticasone [facility]) 50 MCG/ACT nasal spray 2 sprays by Each Nostril route daily 3/25/21   Vandana Varela MD   tretinoin (RETIN-A) 0.025 % cream Apply topically nightly.  12/31/20   Vandana Varela MD   aspirin-acetaminophen-caffeine (EXCEDRIN MIGRAINE) 513-910-35 MG per tablet Take 1 tablet by mouth every 6 hours as needed for Headaches 10/20/20   Vandana Varela MD       Future Appointments   Date Time Provider Maria Esther Najera   11/15/2021  3:30 PM MD BLAYNE Quezada

## 2021-11-09 ENCOUNTER — CARE COORDINATION (OUTPATIENT)
Dept: CARE COORDINATION | Age: 26
End: 2021-11-09

## 2021-11-11 NOTE — CARE COORDINATION
Ambulatory Care Coordination Note  11/11/2021  CM Risk Score: 1  Charlson 10 Year Mortality Risk Score: 4%     ACC: Faby Valentine, AALIYAH    Summary Note:   Call to patient . LM on  with this ACM name and number    Per chart review and recent my chart message, see below    Will cont outreach     Mariza Ward MD  to [facility]      10/29/21 4:10 PM  These look so much better! I want to take you down a little bit across the board. Change to 40 units of lantus once a day can be morning or bedtime but wait a full 24 hour after your last dose of lantus to do that. Will make your sugars kind of funny for a day or two but will be easier in the long run. Also would do 12 units with breakfast and 10 units with lunch and dinner since your sugars are going lower later in the day.      Great work! Care Coordination Interventions    Program Enrollment: Complex Care  Referral from Primary Care Provider: Yes  Suggested Interventions and Community Resources         Goals Addressed    None         Prior to Admission medications    Medication Sig Start Date End Date Taking? Authorizing Provider   atenolol (TENORMIN) 25 MG tablet TAKE ONE TABLET BY MOUTH DAILY 10/25/21   Mariza Ward MD   BLOOD GLUCOSE MONITOR 1 Device One Touch Verio Flex meter    Kiah Lujan MD   blood glucose monitor strips 125 strips by Other route Test 4 times a day & as needed for symptoms of irregular blood glucose. Dispense sufficient amount for indicated testing frequency plus additional to accommodate PRN testing needs     verio flex testing strips.     Kiah Lujan MD   Lancets Micro Thin 33G MISC by Does not apply route One touch Verio Flex    Kiah Lujan MD   ondansetron (ZOFRAN) 4 MG tablet Take 1 tablet by mouth daily as needed for Nausea or Vomiting 10/14/21   Mariza Ward MD   metFORMIN (GLUCOPHAGE-XR) 500 MG extended release tablet Take 4 tablets by mouth daily (with breakfast)  Patient taking

## 2021-11-15 ENCOUNTER — OFFICE VISIT (OUTPATIENT)
Dept: INTERNAL MEDICINE CLINIC | Age: 26
End: 2021-11-15
Payer: MEDICAID

## 2021-11-15 VITALS
SYSTOLIC BLOOD PRESSURE: 128 MMHG | OXYGEN SATURATION: 95 % | WEIGHT: 232 LBS | HEART RATE: 73 BPM | BODY MASS INDEX: 33.29 KG/M2 | DIASTOLIC BLOOD PRESSURE: 76 MMHG | RESPIRATION RATE: 14 BRPM

## 2021-11-15 DIAGNOSIS — E11.9 DIABETES MELLITUS, NEW ONSET (HCC): Primary | ICD-10-CM

## 2021-11-15 DIAGNOSIS — I10 ESSENTIAL HYPERTENSION: ICD-10-CM

## 2021-11-15 DIAGNOSIS — R11.2 NON-INTRACTABLE VOMITING WITH NAUSEA, UNSPECIFIED VOMITING TYPE: ICD-10-CM

## 2021-11-15 DIAGNOSIS — L73.2 HIDRADENITIS SUPPURATIVA OF RIGHT AXILLA: ICD-10-CM

## 2021-11-15 DIAGNOSIS — R10.13 EPIGASTRIC ABDOMINAL PAIN: ICD-10-CM

## 2021-11-15 PROCEDURE — 3046F HEMOGLOBIN A1C LEVEL >9.0%: CPT | Performed by: INTERNAL MEDICINE

## 2021-11-15 PROCEDURE — 99214 OFFICE O/P EST MOD 30 MIN: CPT | Performed by: INTERNAL MEDICINE

## 2021-11-15 PROCEDURE — G8427 DOCREV CUR MEDS BY ELIG CLIN: HCPCS | Performed by: INTERNAL MEDICINE

## 2021-11-15 PROCEDURE — G8417 CALC BMI ABV UP PARAM F/U: HCPCS | Performed by: INTERNAL MEDICINE

## 2021-11-15 PROCEDURE — 2022F DILAT RTA XM EVC RTNOPTHY: CPT | Performed by: INTERNAL MEDICINE

## 2021-11-15 PROCEDURE — 1036F TOBACCO NON-USER: CPT | Performed by: INTERNAL MEDICINE

## 2021-11-15 PROCEDURE — G8482 FLU IMMUNIZE ORDER/ADMIN: HCPCS | Performed by: INTERNAL MEDICINE

## 2021-11-15 RX ORDER — INSULIN LISPRO 100 [IU]/ML
INJECTION, SOLUTION INTRAVENOUS; SUBCUTANEOUS
Qty: 5 PEN | Refills: 2 | Status: SHIPPED
Start: 2021-11-15 | End: 2022-01-03 | Stop reason: DRUGHIGH

## 2021-11-15 RX ORDER — CLINDAMYCIN PHOSPHATE 10 MG/G
GEL TOPICAL
Qty: 75 ML | Refills: 1 | Status: SHIPPED | OUTPATIENT
Start: 2021-11-15 | End: 2021-11-22

## 2021-11-15 RX ORDER — OMEPRAZOLE 40 MG/1
CAPSULE, DELAYED RELEASE ORAL
Qty: 30 CAPSULE | Refills: 1 | Status: SHIPPED | OUTPATIENT
Start: 2021-11-15 | End: 2022-01-13

## 2021-11-15 RX ORDER — INSULIN GLARGINE 100 [IU]/ML
35 INJECTION, SOLUTION SUBCUTANEOUS DAILY
Qty: 5 PEN | Refills: 3 | Status: SHIPPED
Start: 2021-11-15 | End: 2022-01-03 | Stop reason: DRUGHIGH

## 2021-11-15 ASSESSMENT — ENCOUNTER SYMPTOMS
SHORTNESS OF BREATH: 0
ABDOMINAL PAIN: 0
DIARRHEA: 0
NAUSEA: 0
VOMITING: 0

## 2021-11-15 NOTE — PATIENT INSTRUCTIONS
Patient Education        Hidradenitis Suppurativa: Care Instructions  Your Care Instructions     Hidradenitis suppurativa (say \"hyg-jhnn-ro-NY-tus sup-shanell-uh-TY-vuh\") is a skin condition that causes lumps on the skin that look like pimples or boils. The lumps are usually painful and can break open and drain blood and bad-smelling pus. The condition can come and go for many years. Treatment for this condition may include antibiotics and other medicines. You may need surgery to remove the lumps. Home care includes wearing loose-fitting clothes and washing the area gently. You can help prevent lumps from coming back by staying at a healthy weight and not smoking. Doctors don't know exactly how this condition starts. But they do know that something irritates and inflames the hair follicles, causing them to swell and form lumps. This skin condition can't be spread from person to person (isn't contagious). Follow-up care is a key part of your treatment and safety. Be sure to make and go to all appointments, and call your doctor if you are having problems. It's also a good idea to know your test results and keep a list of the medicines you take. How can you care for yourself at home? Skin care    · Wash the area every day with mild soap. Use your hands rather than a washcloth or sponge when you wash that part of your body.     · Leave the affected areas uncovered when you can. If you have lumps that are draining, you can cover them with a bandage or other dressing. Put petroleum jelly (such as Vaseline) on the dressing to help keep it from sticking.     · Wear-loose fitting clothes that don't rub against the area. Avoid activities that cause skin to rub together.     · If you have pain, try a warm compress. Soak a towel or washcloth in warm water, wring it out, and place it on the affected skin for about 10 minutes. Medicines    · Be safe with medicines. Take your medicines exactly as prescribed.  Call your doctor if

## 2021-11-15 NOTE — PROGRESS NOTES
Current Outpatient Medications on File Prior to Visit   Medication Sig Dispense Refill    atenolol (TENORMIN) 25 MG tablet TAKE ONE TABLET BY MOUTH DAILY 30 tablet 5    BLOOD GLUCOSE MONITOR 1 Device One Touch Verio Flex meter      blood glucose monitor strips 125 strips by Other route Test 4 times a day & as needed for symptoms of irregular blood glucose. Dispense sufficient amount for indicated testing frequency plus additional to accommodate PRN testing needs     verio flex testing strips.  Lancets Micro Thin 33G MISC by Does not apply route One touch Verio Flex      ondansetron (ZOFRAN) 4 MG tablet Take 1 tablet by mouth daily as needed for Nausea or Vomiting 30 tablet 0    metFORMIN (GLUCOPHAGE-XR) 500 MG extended release tablet Take 4 tablets by mouth daily (with breakfast) (Patient taking differently: Take 2,000 mg by mouth Daily with supper ) 120 tablet 5    Insulin Pen Needle ([employer] PEN NEEDLES) 31G X 6 MM MISC 1 each by Does not apply route 5 times daily May substitute brand based on insurance formulary 100 each 3    atorvastatin (LIPITOR) 80 MG tablet Take 1 tablet by mouth nightly 30 tablet 3    escitalopram (LEXAPRO) 20 MG tablet TAKE ONE TABLET BY MOUTH DAILY 90 tablet 0    buPROPion (WELLBUTRIN XL) 150 MG extended release tablet TAKE ONE TABLET BY MOUTH EVERY MORNING 30 tablet 2    lisinopril (PRINIVIL;ZESTRIL) 10 MG tablet Take 1 tablet by mouth daily 30 tablet 5    fluticasone (FLONASE) 50 MCG/ACT nasal spray 2 sprays by Each Nostril route daily 1 Bottle 5    tretinoin (RETIN-A) 0.025 % cream Apply topically nightly. 45 g 1    aspirin-acetaminophen-caffeine (EXCEDRIN MIGRAINE) 938-889-46 MG per tablet Take 1 tablet by mouth every 6 hours as needed for Headaches 90 tablet 3     No current facility-administered medications on file prior to visit.         Vitals:    11/15/21 1540   BP: 128/76   Pulse: 73   Resp: 14   SpO2: 95%     Physical Exam  Constitutional:       General: He is not in acute distress. Appearance: Normal appearance. HENT:      Head: Normocephalic and atraumatic. Right Ear: External ear normal.      Left Ear: External ear normal.      Nose: Nose normal.      Mouth/Throat:      Mouth: Mucous membranes are moist.      Pharynx: Oropharynx is clear. Eyes:      General: No scleral icterus. Conjunctiva/sclera: Conjunctivae normal.   Cardiovascular:      Rate and Rhythm: Normal rate and regular rhythm. Pulses: Normal pulses. Heart sounds: Normal heart sounds. No murmur heard. No friction rub. No gallop. Pulmonary:      Effort: Pulmonary effort is normal.      Breath sounds: Normal breath sounds. No wheezing, rhonchi or rales. Abdominal:      General: Abdomen is flat. Bowel sounds are normal.      Palpations: Abdomen is soft. Musculoskeletal:         General: Normal range of motion. Cervical back: Normal range of motion and neck supple. Right lower leg: No edema. Left lower leg: No edema. Skin:     General: Skin is warm and dry. Findings: No rash. Neurological:      General: No focal deficit present. Mental Status: He is alert. Mental status is at baseline. Coordination: Coordination normal.      Gait: Gait normal.   Psychiatric:         Mood and Affect: Mood normal.         Behavior: Behavior normal.           On this date 11/15/2021 I have spent 30 minutes reviewing previous notes, test results and face to face with the patient discussing the diagnosis and importance of compliance with the treatment plan as well as documenting on the day of the visit. An electronic signature was used to authenticate this note.     --Marcela Mcdaniel MD

## 2021-11-18 NOTE — ASSESSMENT & PLAN NOTE
Blood sugars are too tightly controlled. Will have him decrease his lantus to 35 units daily and lispro to 10/8/8 units.

## 2021-11-19 ENCOUNTER — CARE COORDINATION (OUTPATIENT)
Dept: CARE COORDINATION | Age: 26
End: 2021-11-19

## 2021-11-19 NOTE — CARE COORDINATION
Ambulatory Care Coordination Note  11/19/2021  CM Risk Score: 1  Charlson 10 Year Mortality Risk Score: 4%     ACC: Gloria Sanon, RN    Summary Note: Call to patient, LM on VM   prescriptions for insulins changed at pharmacy with updates      Plan   will cont outreach        Care Coordination Interventions    Program Enrollment: Complex Care  Referral from Primary Care Provider: Yes  Suggested Interventions and Community Resources         Goals Addressed    None         Prior to Admission medications    Medication Sig Start Date End Date Taking? Authorizing Provider   omeprazole (PRILOSEC) 40 MG delayed release capsule TAKE ONE CAPSULE BY MOUTH EVERY MORNING BEFORE BREAKFAST 11/15/21   Rolly Chatterjee MD   insulin glargine (LANTUS SOLOSTAR) 100 UNIT/ML injection pen Inject 35 Units into the skin daily 11/15/21   Rolly Chatterjee MD   insulin lispro, 1 Unit Dial, (HUMALOG KWIKPEN) 100 UNIT/ML SOPN 10 units with breakfast and 8 units with lunch and dinner 11/15/21   Rolly Chatterjee MD   clindamycin (CLINDAGEL) 1 % gel Apply topically 2 times daily. 11/15/21 11/22/21  Rolly Chatterjee MD   atenolol (TENORMIN) 25 MG tablet TAKE ONE TABLET BY MOUTH DAILY 10/25/21   Rolly Chatterjee MD   BLOOD GLUCOSE MONITOR 1 Device One Touch Verio Flex meter    Historical MD Le   blood glucose monitor strips 125 strips by Other route Test 4 times a day & as needed for symptoms of irregular blood glucose. Dispense sufficient amount for indicated testing frequency plus additional to accommodate PRN testing needs     verio flex testing strips.     Kiah Lujan MD   Lancets Micro Thin 33G MISC by Does not apply route One touch Verio Flex    Kiah Lujan MD   ondansetron (ZOFRAN) 4 MG tablet Take 1 tablet by mouth daily as needed for Nausea or Vomiting 10/14/21   Rolly Chatterjee MD   metFORMIN (GLUCOPHAGE-XR) 500 MG extended release tablet Take 4 tablets by mouth daily (with breakfast)  Patient taking differently: Take 2,000 mg by mouth Daily with supper  9/30/21   Francisca Maher MD   Insulin Pen Needle ([employer] PEN NEEDLES) 31G X 6 MM MISC 1 each by Does not apply route 5 times daily May substitute brand based on insurance formulary 9/25/21   Leticia Mims MD   atorvastatin (LIPITOR) 80 MG tablet Take 1 tablet by mouth nightly 9/25/21   Leticia Mims MD   escitalopram (LEXAPRO) 20 MG tablet TAKE ONE TABLET BY MOUTH DAILY 8/30/21   Francisca Maher MD   buPROPion (WELLBUTRIN XL) 150 MG extended release tablet TAKE ONE TABLET BY MOUTH EVERY MORNING 7/27/21   FRAN Mckeon - CNP   lisinopril (PRINIVIL;ZESTRIL) 10 MG tablet Take 1 tablet by mouth daily 5/18/21   Francisca Maher MD   fluticasone [facility]) 50 MCG/ACT nasal spray 2 sprays by Each Nostril route daily 3/25/21   Francisca Maher MD   tretinoin (RETIN-A) 0.025 % cream Apply topically nightly.  12/31/20   Francisca Maher MD   aspirin-acetaminophen-caffeine (EXCEDRIN MIGRAINE) 325-256-04 MG per tablet Take 1 tablet by mouth every 6 hours as needed for Headaches 10/20/20   Francisca Maher MD       Future Appointments   Date Time Provider Maria Esther Najera   1/3/2022  8:30 AM MD BLAYNE Cartagena

## 2021-12-02 DIAGNOSIS — R11.2 NON-INTRACTABLE VOMITING WITH NAUSEA, UNSPECIFIED VOMITING TYPE: ICD-10-CM

## 2021-12-02 DIAGNOSIS — R10.13 EPIGASTRIC ABDOMINAL PAIN: ICD-10-CM

## 2021-12-02 DIAGNOSIS — R51.9 CHRONIC DAILY HEADACHE: ICD-10-CM

## 2021-12-02 DIAGNOSIS — I10 ESSENTIAL HYPERTENSION: ICD-10-CM

## 2021-12-03 RX ORDER — ONDANSETRON 4 MG/1
4 TABLET, FILM COATED ORAL DAILY PRN
Qty: 30 TABLET | Refills: 0 | Status: SHIPPED | OUTPATIENT
Start: 2021-12-03 | End: 2022-01-13

## 2021-12-03 RX ORDER — LISINOPRIL 10 MG/1
TABLET ORAL
Qty: 30 TABLET | Refills: 5 | Status: SHIPPED | OUTPATIENT
Start: 2021-12-03 | End: 2022-06-06

## 2021-12-13 ENCOUNTER — CARE COORDINATION (OUTPATIENT)
Dept: CARE COORDINATION | Age: 26
End: 2021-12-13

## 2021-12-16 NOTE — CARE COORDINATION
Ambulatory Care Coordination Note  12/16/2021  CM Risk Score: 1  Charlson 10 Year Mortality Risk Score: 4%     ACC: London Woody RN    Summary Note: Call to patient , BENNY on VM with this ACM name and number to please call     will cont outreach        Care Coordination Interventions    Program Enrollment: Complex Care  Referral from Primary Care Provider: Yes  Suggested Interventions and Community Resources         Goals Addressed    None         Prior to Admission medications    Medication Sig Start Date End Date Taking? Authorizing Provider   escitalopram (LEXAPRO) 20 MG tablet TAKE ONE TABLET BY MOUTH DAILY 12/14/21   Nano Truong MD   buPROPion (WELLBUTRIN XL) 150 MG extended release tablet TAKE ONE TABLET BY MOUTH EVERY MORNING 12/14/21   Nano Truong MD   lisinopril (PRINIVIL;ZESTRIL) 10 MG tablet TAKE ONE TABLET BY MOUTH DAILY 12/3/21   Nano Truong MD   ondansetron (ZOFRAN) 4 MG tablet TAKE 1 TABLET BY MOUTH DAILY AS NEEDED FOR NAUSEA OR VOMITING 12/3/21   Nano Truong MD   omeprazole (PRILOSEC) 40 MG delayed release capsule TAKE ONE CAPSULE BY MOUTH EVERY MORNING BEFORE BREAKFAST 11/15/21   Nano Truong MD   insulin glargine (LANTUS SOLOSTAR) 100 UNIT/ML injection pen Inject 35 Units into the skin daily 11/15/21   Nano Truong MD   insulin lispro, 1 Unit Dial, (HUMALOG KWIKPEN) 100 UNIT/ML SOPN 10 units with breakfast and 8 units with lunch and dinner 11/15/21   Nano Truong MD   atenolol (TENORMIN) 25 MG tablet TAKE ONE TABLET BY MOUTH DAILY 10/25/21   Nano Truong MD   BLOOD GLUCOSE MONITOR 1 Device One Touch Verio Flex meter    Historical MD Le   blood glucose monitor strips 125 strips by Other route Test 4 times a day & as needed for symptoms of irregular blood glucose. Dispense sufficient amount for indicated testing frequency plus additional to accommodate PRN testing needs     verio flex testing strips.     Kiah Lujan MD   Lancets Micro Thin 33G MISC by Does not apply route One touch Verio Flex    Historical Provider, MD   metFORMIN (GLUCOPHAGE-XR) 500 MG extended release tablet Take 4 tablets by mouth daily (with breakfast)  Patient taking differently: Take 2,000 mg by mouth Daily with supper  9/30/21   Tanna Mendoza MD   Insulin Pen Needle ([employer] PEN NEEDLES) 31G X 6 MM MISC 1 each by Does not apply route 5 times daily May substitute brand based on insurance formulary 9/25/21   Molly Jonas MD   atorvastatin (LIPITOR) 80 MG tablet Take 1 tablet by mouth nightly 9/25/21   Molly Jonas MD   fluticasone (FLONASE) 50 MCG/ACT nasal spray 2 sprays by Each Nostril route daily 3/25/21   Tanna Mendoza MD   tretinoin (RETIN-A) 0.025 % cream Apply topically nightly.  12/31/20   Tanna Mendoza MD   aspirin-acetaminophen-caffeine (EXCEDRIN MIGRAINE) 789-440-72 MG per tablet Take 1 tablet by mouth every 6 hours as needed for Headaches 10/20/20   Tanna Mendoza MD       Future Appointments   Date Time Provider Maria Esther Najera   1/3/2022  8:30 AM MD BLAYNE Albert

## 2022-01-03 ENCOUNTER — OFFICE VISIT (OUTPATIENT)
Dept: INTERNAL MEDICINE CLINIC | Age: 27
End: 2022-01-03
Payer: MEDICAID

## 2022-01-03 VITALS
HEART RATE: 73 BPM | WEIGHT: 232 LBS | OXYGEN SATURATION: 97 % | BODY MASS INDEX: 33.29 KG/M2 | DIASTOLIC BLOOD PRESSURE: 68 MMHG | SYSTOLIC BLOOD PRESSURE: 114 MMHG | RESPIRATION RATE: 14 BRPM

## 2022-01-03 DIAGNOSIS — I10 ESSENTIAL HYPERTENSION: ICD-10-CM

## 2022-01-03 DIAGNOSIS — Z79.4 DIABETES MELLITUS TYPE 2, INSULIN DEPENDENT (HCC): ICD-10-CM

## 2022-01-03 DIAGNOSIS — E11.9 DIABETES MELLITUS TYPE 2, INSULIN DEPENDENT (HCC): ICD-10-CM

## 2022-01-03 DIAGNOSIS — J34.1 MUCOUS RETENTION CYST OF MAXILLARY SINUS: ICD-10-CM

## 2022-01-03 DIAGNOSIS — E11.9 DIABETES MELLITUS, NEW ONSET (HCC): Primary | ICD-10-CM

## 2022-01-03 DIAGNOSIS — F39 MOOD DISORDER (HCC): ICD-10-CM

## 2022-01-03 DIAGNOSIS — F41.9 ANXIETY: ICD-10-CM

## 2022-01-03 LAB — HBA1C MFR BLD: 5.6 %

## 2022-01-03 PROCEDURE — G8417 CALC BMI ABV UP PARAM F/U: HCPCS | Performed by: INTERNAL MEDICINE

## 2022-01-03 PROCEDURE — G8482 FLU IMMUNIZE ORDER/ADMIN: HCPCS | Performed by: INTERNAL MEDICINE

## 2022-01-03 PROCEDURE — 1036F TOBACCO NON-USER: CPT | Performed by: INTERNAL MEDICINE

## 2022-01-03 PROCEDURE — 99214 OFFICE O/P EST MOD 30 MIN: CPT | Performed by: INTERNAL MEDICINE

## 2022-01-03 PROCEDURE — 2022F DILAT RTA XM EVC RTNOPTHY: CPT | Performed by: INTERNAL MEDICINE

## 2022-01-03 PROCEDURE — G8427 DOCREV CUR MEDS BY ELIG CLIN: HCPCS | Performed by: INTERNAL MEDICINE

## 2022-01-03 PROCEDURE — 3044F HG A1C LEVEL LT 7.0%: CPT | Performed by: INTERNAL MEDICINE

## 2022-01-03 PROCEDURE — 83036 HEMOGLOBIN GLYCOSYLATED A1C: CPT | Performed by: INTERNAL MEDICINE

## 2022-01-03 RX ORDER — INSULIN LISPRO 100 [IU]/ML
INJECTION, SOLUTION INTRAVENOUS; SUBCUTANEOUS
Qty: 5 PEN | Refills: 2 | Status: SHIPPED
Start: 2022-01-03 | End: 2022-02-15 | Stop reason: ALTCHOICE

## 2022-01-03 RX ORDER — INSULIN GLARGINE 100 [IU]/ML
20 INJECTION, SOLUTION SUBCUTANEOUS DAILY
Qty: 5 PEN | Refills: 3 | Status: SHIPPED
Start: 2022-01-03 | End: 2022-05-10 | Stop reason: ALTCHOICE

## 2022-01-03 ASSESSMENT — ENCOUNTER SYMPTOMS
DIARRHEA: 0
VOMITING: 0
NAUSEA: 0
ABDOMINAL PAIN: 0
SHORTNESS OF BREATH: 0

## 2022-01-03 NOTE — ASSESSMENT & PLAN NOTE
A1C 5.6% from 14% since diagnosis in September. Think we have gained good enough/aggressive control enough to back way off on insulin with goal of getting off completely. Will change to 6 units lispro with largest meal and 20 units of lantus daily. RTC 6 weeks. Continue decrease if able. He is on statin (was put on by hospital). He is on lisinopril. Still recommend cardiology consultation given t wave inversions previously and likely upcoming surgery.

## 2022-01-03 NOTE — ASSESSMENT & PLAN NOTE
Current meds lexapro 20mg daily and wellbutrin XL 150mg daily. Recommend psychiatry consult given still some clarity on diagnosis lacking. He is in agreement with this.

## 2022-01-03 NOTE — PROGRESS NOTES
Sergey Hackett (:  1995) is a 23 y.o. male,Established patient, here for evaluation of the following chief complaint(s):  Diabetes      ASSESSMENT/PLAN:  1. Diabetes mellitus, new onset ([facility].)  Assessment & Plan:  A1C 5.6% from 14% since diagnosis in September. Think we have gained good enough/aggressive control enough to back way off on insulin with goal of getting off completely. Will change to 6 units lispro with largest meal and 20 units of lantus daily. RTC 6 weeks. Continue decrease if able. He is on statin (was put on by hospital). He is on lisinopril. Still recommend cardiology consultation given t wave inversions previously and likely upcoming surgery. Orders:  -     POCT glycosylated hemoglobin (Hb A1C)  2. Diabetes mellitus type 2, insulin dependent ([facility].)  Assessment & Plan:  A1C 5.6% from 14% since diagnosis in September. Think we have gained good enough/aggressive control enough to back way off on insulin with goal of getting off completely. Will change to 6 units lispro with largest meal and 20 units of lantus daily. RTC 6 weeks. Continue decrease if able. He is on statin (was put on by hospital). He is on lisinopril. Still recommend cardiology consultation given t wave inversions previously and likely upcoming surgery. 3. Essential hypertension  Assessment & Plan:  BP well controlled. Continue current meds. On ACE-I. And atenolol. 4. Mucous retention cyst of maxillary sinus  Assessment & Plan:  He will consul [facility] Dr. Jose Elizabeth regarding this and rescheduling surgery in the coming months. 5. Anxiety  Assessment & Plan:  Current meds lexapro 20mg daily and wellbutrin XL 150mg daily. Recommend psychiatry consult given still some clarity on diagnosis lacking. He is in agreement with this. Orders:  -     Ambulatory referral to Psychiatry  6.  Mood disorder ([facility]  -     Ambulatory referral to Psychiatry      Return in about 6 weeks (around 2022) for DM2.    SUBJECTIVE/OBJECTIVE:  HPI    Here for follow up of recently diagnosed diabetes. Control has overall been good - mostly . Did not bring log today  Current insulin regimen 35 units lantus in the morning and 6 units of lispro with meals. Lowest he has seen is 70s. Missed a few doses over [place] holidays and has not noticed any changes in blood sugars. .     Mood and anxiety have been stable  Previously diagnosed with bipolar in college/grad school - mostly because of severe depression that was difficult to treat with comorbid anxiety  Has had that diagnosis questioned in the past.     Review of Systems   Constitutional: Negative for chills, fatigue and fever. Respiratory: Negative for shortness of breath. Cardiovascular: Negative for chest pain. Gastrointestinal: Negative for abdominal pain, diarrhea, nausea and vomiting. Psychiatric/Behavioral: Negative for dysphoric mood and sleep disturbance. The patient is not nervous/anxious. Current Outpatient Medications on File Prior to Visit   Medication Sig Dispense Refill    escitalopram (LEXAPRO) 20 MG tablet TAKE ONE TABLET BY MOUTH DAILY 30 tablet 5    buPROPion (WELLBUTRIN XL) 150 MG extended release tablet TAKE ONE TABLET BY MOUTH EVERY MORNING 30 tablet 5    lisinopril (PRINIVIL;ZESTRIL) 10 MG tablet TAKE ONE TABLET BY MOUTH DAILY 30 tablet 5    ondansetron (ZOFRAN) 4 MG tablet TAKE 1 TABLET BY MOUTH DAILY AS NEEDED FOR NAUSEA OR VOMITING 30 tablet 0    omeprazole (PRILOSEC) 40 MG delayed release capsule TAKE ONE CAPSULE BY MOUTH EVERY MORNING BEFORE BREAKFAST 30 capsule 1    atenolol (TENORMIN) 25 MG tablet TAKE ONE TABLET BY MOUTH DAILY 30 tablet 5    BLOOD GLUCOSE MONITOR 1 Device One Touch Verio Flex meter      blood glucose monitor strips 125 strips by Other route Test 4 times a day & as needed for symptoms of irregular blood glucose.  Dispense sufficient amount for indicated testing frequency plus additional to accommodate PRN testing needs     verio flex testing strips.  Lancets Micro Thin 33G MISC by Does not apply route One touch Verio Flex      metFORMIN (GLUCOPHAGE-XR) 500 MG extended release tablet Take 4 tablets by mouth daily (with breakfast) (Patient taking differently: Take 2,000 mg by mouth Daily with supper ) 120 tablet 5    Insulin Pen Needle ([employer] PEN NEEDLES) 31G X 6 MM MISC 1 each by Does not apply route 5 times daily May substitute brand based on insurance formulary 100 each 3    atorvastatin (LIPITOR) 80 MG tablet Take 1 tablet by mouth nightly 30 tablet 3    fluticasone (FLONASE) 50 MCG/ACT nasal spray 2 sprays by Each Nostril route daily 1 Bottle 5    tretinoin (RETIN-A) 0.025 % cream Apply topically nightly. 45 g 1    aspirin-acetaminophen-caffeine (EXCEDRIN MIGRAINE) 232-563-65 MG per tablet Take 1 tablet by mouth every 6 hours as needed for Headaches 90 tablet 3     No current facility-administered medications on file prior to visit. Vitals:    01/03/22 0835   BP: 114/68   Pulse: 73   Resp: 14   SpO2: 97%     Physical Exam  Constitutional:       General: He is not in acute distress. Appearance: Normal appearance. HENT:      Head: Normocephalic and atraumatic. Right Ear: External ear normal.      Left Ear: External ear normal.      Nose: Nose normal.      Mouth/Throat:      Mouth: Mucous membranes are moist.      Pharynx: Oropharynx is clear. Eyes:      General: No scleral icterus. Conjunctiva/sclera: Conjunctivae normal.   Cardiovascular:      Rate and Rhythm: Normal rate and regular rhythm. Pulses: Normal pulses. Heart sounds: Normal heart sounds. No murmur heard. No friction rub. No gallop. Pulmonary:      Effort: Pulmonary effort is normal.      Breath sounds: Normal breath sounds. No wheezing, rhonchi or rales. Abdominal:      General: Abdomen is flat. Bowel sounds are normal.      Palpations: Abdomen is soft.    Musculoskeletal: General: Normal range of motion. Cervical back: Normal range of motion and neck supple. Right lower leg: No edema. Left lower leg: No edema. Skin:     General: Skin is warm and dry. Findings: No rash. Neurological:      General: No focal deficit present. Mental Status: He is alert. Mental status is at baseline. Coordination: Coordination normal.      Gait: Gait normal.   Psychiatric:         Mood and Affect: Mood normal.         Behavior: Behavior normal.           On this date 1/3/2022 I have spent 30 minutes reviewing previous notes, test results and face to face with the patient discussing the diagnosis and importance of compliance with the treatment plan as well as documenting on the day of the visit. An electronic signature was used to authenticate this note.     --Julia Tucker MD

## 2022-01-03 NOTE — ASSESSMENT & PLAN NOTE
He will consul [facility]NT Dr. Franki Rivera regarding this and rescheduling surgery in the coming months.

## 2022-01-05 NOTE — PROGRESS NOTES
PSYCHIATRY INITIAL EVALUATION    Macho Forde  1995 01/06/22  Face to Face time: 60 minutes  PCP: Emily Frederick MD    CC: Anxiety and Depression      ASSESSMENT:   Patient is a 27-year-old male with significant past medical history of chronic headache, chronic back pain, hypertension, type 2 diabetes who presents to the psychiatry clinic today for evaluation and management of depression and anxiety. Patient's presentation today is rather interesting given the apparent number of diagnoses that have not been added to his problem list.  His previous diagnosis of bipolar disorder is NOT supported by today's evaluation, as there is no evidence suggestive of manic or hypomanic episodes even extending back into his college days when the diagnosis was initially made. There is significant evidence for an ongoing underlying major depressive disorder today characterized as mild to moderate in severity. Additionally to this there is a generalized anxiety disorder with evidence of panic attacks. Those being said, there are due to appear to be ongoing chronic medical/psychiatric issues that appear pertinent to this patient's condition. He does appear to meet criteria for Tourette's syndrome with multiple facial motor tics that were seen on evaluation today. No phonic tics were elicited or verbalized by the patient. In addition to this, strongly suspect that the patient would qualify for a diagnosis of autism spectrum disorder, previously would have been Aspberger's syndrome. With his facial tics and some of the difficulty interacting with peers, suspect that this helped to foster a social anxiety disorder and the patient, though this was not formally measured on today's assessment. Diagnosis:  Major depressive disorder, recurrent, mild  Generalized anxiety disorder with panic  Social anxiety disorder, severity unspecified  Tourette syndrome  Concern for autism spectrum disorder    PLAN:   1. Discussion had with the patient regarding ongoing management of his depressive/anxiety spectrum illnesses as well as these being seen in the setting of his other medical/psychiatric illnesses. Medication management options do exist even though the patient has had multiple medication trials in the past.  Patient on board with current plan of care. 2.  Will initiate additional treatment with aripiprazole 5 mg daily to augment his current medication regimen for depression in particular. Suspect that aripiprazole may also help to minimize some of his facial tics. Patient was cautioned about potential adverse events with this medication including increased movement disorders, akathisia, and changes to weight and blood pressure. 3.  We will plan to maintain current medications of escitalopram 20 mg daily and bupropion 150 mg daily extended release. At subsequent visits, can consider increase of the bupropion as this is a rather low dose for treatment of things such as depression. Bupropion may also be helpful for this patient if there are comorbid ADHD symptoms. 4.  At subsequent evaluation, would recommend completion of Liebowitz Social Anxiety Scale. Medication Monitoring:    - PDMP reviewed: No information found       Follow-up: RTC in 4 weeks    Safety: Pt was counseled on the potential for increased suicidal ideations and advised on potential options for dealing with these including hotlines, calling the office, or going to the nearest emergency room. ____________________________________________________________________________    HPI:   Patient is a 73-year-old male with significant past medical history of chronic headache, chronic back pain, hypertension, type 2 diabetes who presents to the psychiatry clinic today for evaluation and management of depression and anxiety. Patient reports the today's visit is designed to help with diagnostic clarity for him.   He feels that his previous diagnoses do not well fit his current symptomology. Historically, the patient identified that approximately 5 years ago was when his mental health issues came to the forefront for the first time. At that time he was noting an increase in depressive moods though he was diagnosed with bipolar disorder and felt that it never really fit him. He did identify some impulsivity issues at that time especially with purchasing collectibles, though he notes that this has improved significantly over the past 5 years. Patient denied periods of prolonged sleeplessness or reduced sleep, denied significant episodes of grandiosity. On depression screening, the patient identified ongoing difficulties with low motivation and low energy. He identified also that his sleep is markedly problematic and that he will chronically oversleep, sometimes sleeping in excess of 10 hours nightly. Patient endorsed that there is some mild anhedonia to some previously enjoyed activities, though he also identified that he still very much enjoys collecting certain things (videogames and collectibles). Patient identified intermittent passive suicidal ideations that never increased to the point of being active. On anxiety screening, the patient endorsed issues with panic attacks that can occur multiple times per week. These panic attacks can last anywhere from minutes to hours, are characterized by a \"heavy chest feeling\", abnormal sensations in his stomach, and sometimes have feelings of dread associated. Patient endorsed that social situations in particular can make him anxious and historically have always made him anxious. Worst scenarios are where he has to confront somebody in a position of authority or make phone calls. Patient screened negative for trauma or psychotic spectrum illnesses. ROS:   Review of Systems   Constitutional: Negative. HENT: Negative. Eyes: Negative. Respiratory: Negative. Cardiovascular: Negative. Gastrointestinal: Negative. Endocrine: Negative. Genitourinary: Negative. Musculoskeletal: Positive for back pain. Skin: Negative. Allergic/Immunologic: Negative. Neurological: Negative. Hematological: Negative. Psychiatric/Behavioral: Positive for decreased concentration, dysphoric mood and sleep disturbance. The patient is nervous/anxious. Past Psychiatric History:     Hosp: Denied  Diagnoses: Bipolar 2 disorder, generalized anxiety disorder  Currrent medications:  Bupropion, escitalopram  Med trials: lamotrigine,    Outpt: Previously worked with NP Robby Patel  NSSI: Denied  Suicide Attempts: Denied    Past Medical History:   Diagnosis Date    Anxiety     Depression     Hypertension      No past surgical history on file. Social History     Socioeconomic History    Marital status: Single     Spouse name: None    Number of children: 0    Years of education: 18    Highest education level: Bachelor's degree (e.g., BA, AB, BS)   Occupational History    None   Tobacco Use    Smoking status: Never Smoker    Smokeless tobacco: Never Used   Substance and Sexual Activity    Alcohol use: Yes     Comment: Occasional, 2-3 x/wk, 1 mixed drink    Drug use: Never    Sexual activity: Not Currently     Partners: Female   Other Topics Concern    None   Social History Narrative    Finishing grad school    Moved to [place] in 2020. Patient currently living by himself with his 2 cats, supporting himself off of savings and the inheritance left by his father. He is currently attempting to reengage and finish his graduate education in [employer] and working on his thesis. During school years, the patient did identify being placed in \"speech impediment classes\" which he believes were actually special education courses. No guns in the home, no [employer], no legal issues at this time     Social Determinants of Health     Financial Resource Strain:     Difficulty of Paying Living Expenses: Not on file   Food Insecurity:     Worried About [employer] [employer] in the Last Year: Not on file    Machelle of Food in the Last Year: Not on file   Transportation Needs:     Lack of Transportation (Medical): Not on file    Lack of Transportation (Non-Medical):  Not on file   Physical Activity:     Days of Exercise per Week: Not on file    Minutes of Exercise per Session: Not on file   Stress:     Feeling of Stress : Not on file   Social Connections:     Frequency of Communication with Friends and Family: Not on file    Frequency of Social Gatherings with Friends and Family: Not on file    Attends Zoroastrian Services: Not on file    Active Member of Clubs or Organizations: Not on file    Attends Club or Organization Meetings: Not on file    Marital Status: Not on file   Intimate Partner Violence:     Fear of Current or Ex-Partner: Not on file    Emotionally Abused: Not on file    Physically Abused: Not on file    Sexually Abused: Not on file   Housing Stability:     Unable to Pay for Housing in the Last Year: Not on file    Number of [place] in the Last Year: Not on file    Unstable Housing in the Last Year: Not on file      Family History   Problem Relation Age of Onset    Cancer Mother         melanoma    Hypertension Mother     Diabetes Mother     Other Mother         concerns for hoarding    Hypertension Father     Anxiety Disorder Father     Diabetes Father     Suicide Attempts Father         pills     Allergies   Allergen Reactions    No Known Allergies      Current Outpatient Medications on File Prior to Visit   Medication Sig Dispense Refill    insulin glargine (LANTUS SOLOSTAR) 100 UNIT/ML injection pen Inject 20 Units into the skin daily 5 pen 3    insulin lispro, 1 Unit Dial, (HUMALOG KWIKPEN) 100 UNIT/ML SOPN 6 units SC with largest meal 5 pen 2    escitalopram (LEXAPRO) 20 MG tablet TAKE ONE TABLET BY MOUTH DAILY 30 tablet 5    buPROPion [facility] XL) 150 MG extended release tablet TAKE ONE TABLET BY MOUTH EVERY MORNING 30 tablet 5    lisinopril (PRINIVIL;ZESTRIL) 10 MG tablet TAKE ONE TABLET BY MOUTH DAILY 30 tablet 5    ondansetron (ZOFRAN) 4 MG tablet TAKE 1 TABLET BY MOUTH DAILY AS NEEDED FOR NAUSEA OR VOMITING 30 tablet 0    omeprazole (PRILOSEC) 40 MG delayed release capsule TAKE ONE CAPSULE BY MOUTH EVERY MORNING BEFORE BREAKFAST 30 capsule 1    atenolol (TENORMIN) 25 MG tablet TAKE ONE TABLET BY MOUTH DAILY 30 tablet 5    BLOOD GLUCOSE MONITOR 1 Device One Touch Verio Flex meter      blood glucose monitor strips 125 strips by Other route Test 4 times a day & as needed for symptoms of irregular blood glucose. Dispense sufficient amount for indicated testing frequency plus additional to accommodate PRN testing needs     verio flex testing strips.  Lancets Micro Thin 33G MISC by Does not apply route One touch Verio Flex      metFORMIN (GLUCOPHAGE-XR) 500 MG extended release tablet Take 4 tablets by mouth daily (with breakfast) (Patient taking differently: Take 2,000 mg by mouth Daily with supper ) 120 tablet 5    Insulin Pen Needle ([employer] PEN NEEDLES) 31G X 6 MM MISC 1 each by Does not apply route 5 times daily May substitute brand based on insurance formulary 100 each 3    atorvastatin (LIPITOR) 80 MG tablet Take 1 tablet by mouth nightly 30 tablet 3    fluticasone (FLONASE) 50 MCG/ACT nasal spray 2 sprays by Each Nostril route daily 1 Bottle 5    tretinoin (RETIN-A) 0.025 % cream Apply topically nightly. 45 g 1    aspirin-acetaminophen-caffeine (EXCEDRIN MIGRAINE) 832-175-09 MG per tablet Take 1 tablet by mouth every 6 hours as needed for Headaches 90 tablet 3     No current facility-administered medications on file prior to visit.        OBJECTIVE:  Vitals:    01/06/22 1400   BP: 118/78   Site: Right Upper Arm   Position: Sitting   Cuff Size: Medium Adult   Pulse: 60   Resp: 12   Weight: 231 lb (104.8 kg)       MSE:   Appearance: Obese male, appropriately dressed  Motor: Prominent motor tic noted in his eyes with excessive squinting/closing them noted multiple times throughout the course of the visit. Possible evidence of lower facial motor tics as well. Eye Contact: Generally poor  Speech:    Appropriate rate and tone. No evidence of phonic tics noted  Language:   Appropriate diction  Mood/Affect:   \"A bit anxious\"/mildly anxious affect at times but generally blunted  Thought Process:    Linear, logical  Thought Content:    Topic appropriate, no SI/HI verbalized  Hallucinations:   Denied, not responding to internal stimuli  Associations:   Intact  Attention/Concentration:   Intact  Orientation:    Alert and oriented x4  Memory:   Intact  Fund of Knowledge:    Appropriate for age and education  Insight/Judgement:   Intact/intact    FAREED-7 SCREENING 1/6/2022   Feeling nervous, anxious, or on edge Nearly every day   Not being able to stop or control worrying Nearly every day   Worrying too much about different things Nearly every day   Trouble relaxing More than half the days   Being so restless that it is hard to sit still Several days   Becoming easily annoyed or irritable Nearly every day   Feeling afraid as if something awful might happen More than half the days   FAREED-7 Total Score 17     PHQ-9 Questionaire 1/6/2022 5/18/2021   Little interest or pleasure in doing things 2 3   Feeling down, depressed, or hopeless 2 2   Trouble falling or staying asleep, or sleeping too much 3 3   Feeling tired or having little energy 3 3   Poor appetite or overeating 1 2   Feeling bad about yourself - or that you are a failure or have let yourself or your family down 1 1   Trouble concentrating on things, such as reading the newspaper or watching television 1 2   Moving or speaking so slowly that other people could have noticed.  Or the opposite - being so fidgety or restless that you have been moving around a lot more than usual 0 0   Thoughts that you would be better off dead, or of hurting yourself in some way 0 1   PHQ-9 Total Score 13 17   If you checked off any problems, how difficult have these problems made it for you to do your work, take care of things at home, or get along with other people?  1 2        Labs:     Lab Results   Component Value Date    CHOL 234 (H) 09/22/2021    CHOL 242 (H) 09/21/2021     Lab Results   Component Value Date    TRIG 623 (H) 09/22/2021    TRIG 314 (H) 09/21/2021     Lab Results   Component Value Date    HDL 27 (L) 09/22/2021    HDL 33 (L) 09/21/2021     Lab Results   Component Value Date    LDLCALC see below 09/22/2021    [address] see below 09/21/2021     Lab Results   Component Value Date    LABVLDL see below 09/22/2021    LABVLDL see below 09/21/2021       Lab Results   Component Value Date    LABA1C 5.6 01/03/2022     Lab Results   Component Value Date    .1 09/22/2021       Lab Results   Component Value Date    TSHREFLEX 2.04 09/22/2021       Lab Results   Component Value Date    EHEAJVTJ50 1231 (H) 09/21/2021       Last Drug screen: None on file    Imaging: MRI Brain w/o contrast 3/18/2021 reviewed, no significant pertinent findings for this evaluation    EKG: 10/14/2021 QTc 400        Marilou Merchant MD   Psychiatry

## 2022-01-06 ENCOUNTER — OFFICE VISIT (OUTPATIENT)
Dept: PSYCHIATRY | Age: 27
End: 2022-01-06
Payer: MEDICAID

## 2022-01-06 VITALS
WEIGHT: 231 LBS | HEART RATE: 60 BPM | RESPIRATION RATE: 12 BRPM | DIASTOLIC BLOOD PRESSURE: 78 MMHG | BODY MASS INDEX: 33.15 KG/M2 | SYSTOLIC BLOOD PRESSURE: 118 MMHG

## 2022-01-06 DIAGNOSIS — F84.0 AUTISM SPECTRUM DISORDER: ICD-10-CM

## 2022-01-06 DIAGNOSIS — F41.1 GENERALIZED ANXIETY DISORDER WITH PANIC ATTACKS: Primary | ICD-10-CM

## 2022-01-06 DIAGNOSIS — F41.0 GENERALIZED ANXIETY DISORDER WITH PANIC ATTACKS: Primary | ICD-10-CM

## 2022-01-06 DIAGNOSIS — F95.2 TOURETTE SYNDROME: ICD-10-CM

## 2022-01-06 DIAGNOSIS — F33.0 MILD EPISODE OF RECURRENT MAJOR DEPRESSIVE DISORDER (HCC): ICD-10-CM

## 2022-01-06 PROCEDURE — G8427 DOCREV CUR MEDS BY ELIG CLIN: HCPCS | Performed by: STUDENT IN AN ORGANIZED HEALTH CARE EDUCATION/TRAINING PROGRAM

## 2022-01-06 PROCEDURE — 1036F TOBACCO NON-USER: CPT | Performed by: STUDENT IN AN ORGANIZED HEALTH CARE EDUCATION/TRAINING PROGRAM

## 2022-01-06 PROCEDURE — 99204 OFFICE O/P NEW MOD 45 MIN: CPT | Performed by: STUDENT IN AN ORGANIZED HEALTH CARE EDUCATION/TRAINING PROGRAM

## 2022-01-06 PROCEDURE — G8482 FLU IMMUNIZE ORDER/ADMIN: HCPCS | Performed by: STUDENT IN AN ORGANIZED HEALTH CARE EDUCATION/TRAINING PROGRAM

## 2022-01-06 PROCEDURE — G8417 CALC BMI ABV UP PARAM F/U: HCPCS | Performed by: STUDENT IN AN ORGANIZED HEALTH CARE EDUCATION/TRAINING PROGRAM

## 2022-01-06 RX ORDER — ARIPIPRAZOLE 5 MG/1
5 TABLET ORAL DAILY
Qty: 30 TABLET | Refills: 3 | Status: SHIPPED | OUTPATIENT
Start: 2022-01-06 | End: 2022-04-13 | Stop reason: SDUPTHER

## 2022-01-06 ASSESSMENT — PATIENT HEALTH QUESTIONNAIRE - PHQ9
SUM OF ALL RESPONSES TO PHQ QUESTIONS 1-9: 13
4. FEELING TIRED OR HAVING LITTLE ENERGY: 3
5. POOR APPETITE OR OVEREATING: 1
9. THOUGHTS THAT YOU WOULD BE BETTER OFF DEAD, OR OF HURTING YOURSELF: 0
8. MOVING OR SPEAKING SO SLOWLY THAT OTHER PEOPLE COULD HAVE NOTICED. OR THE OPPOSITE, BEING SO FIGETY OR RESTLESS THAT YOU HAVE BEEN MOVING AROUND A LOT MORE THAN USUAL: 0
2. FEELING DOWN, DEPRESSED OR HOPELESS: 2
SUM OF ALL RESPONSES TO PHQ QUESTIONS 1-9: 13
SUM OF ALL RESPONSES TO PHQ QUESTIONS 1-9: 13
1. LITTLE INTEREST OR PLEASURE IN DOING THINGS: 2
6. FEELING BAD ABOUT YOURSELF - OR THAT YOU ARE A FAILURE OR HAVE LET YOURSELF OR YOUR FAMILY DOWN: 1
3. TROUBLE FALLING OR STAYING ASLEEP: 3
10. IF YOU CHECKED OFF ANY PROBLEMS, HOW DIFFICULT HAVE THESE PROBLEMS MADE IT FOR YOU TO DO YOUR WORK, TAKE CARE OF THINGS AT HOME, OR GET ALONG WITH OTHER PEOPLE: 1
SUM OF ALL RESPONSES TO PHQ9 QUESTIONS 1 & 2: 4
7. TROUBLE CONCENTRATING ON THINGS, SUCH AS READING THE NEWSPAPER OR WATCHING TELEVISION: 1
SUM OF ALL RESPONSES TO PHQ QUESTIONS 1-9: 13

## 2022-01-06 ASSESSMENT — ANXIETY QUESTIONNAIRES
6. BECOMING EASILY ANNOYED OR IRRITABLE: 3
7. FEELING AFRAID AS IF SOMETHING AWFUL MIGHT HAPPEN: 2
5. BEING SO RESTLESS THAT IT IS HARD TO SIT STILL: 1
1. FEELING NERVOUS, ANXIOUS, OR ON EDGE: 3
2. NOT BEING ABLE TO STOP OR CONTROL WORRYING: 3
IF YOU CHECKED OFF ANY PROBLEMS ON THIS QUESTIONNAIRE, HOW DIFFICULT HAVE THESE PROBLEMS MADE IT FOR YOU TO DO YOUR WORK, TAKE CARE OF THINGS AT HOME, OR GET ALONG WITH OTHER PEOPLE: VERY DIFFICULT
GAD7 TOTAL SCORE: 17
3. WORRYING TOO MUCH ABOUT DIFFERENT THINGS: 3
4. TROUBLE RELAXING: 2

## 2022-01-06 ASSESSMENT — ENCOUNTER SYMPTOMS
ALLERGIC/IMMUNOLOGIC NEGATIVE: 1
GASTROINTESTINAL NEGATIVE: 1
EYES NEGATIVE: 1
BACK PAIN: 1
RESPIRATORY NEGATIVE: 1

## 2022-01-12 DIAGNOSIS — R11.2 NON-INTRACTABLE VOMITING WITH NAUSEA, UNSPECIFIED VOMITING TYPE: ICD-10-CM

## 2022-01-12 DIAGNOSIS — R10.13 EPIGASTRIC ABDOMINAL PAIN: ICD-10-CM

## 2022-01-13 RX ORDER — ONDANSETRON 4 MG/1
TABLET, FILM COATED ORAL
Qty: 30 TABLET | Refills: 0 | Status: SHIPPED | OUTPATIENT
Start: 2022-01-13

## 2022-01-13 RX ORDER — OMEPRAZOLE 40 MG/1
CAPSULE, DELAYED RELEASE ORAL
Qty: 30 CAPSULE | Refills: 1 | Status: SHIPPED | OUTPATIENT
Start: 2022-01-13 | End: 2022-03-11

## 2022-01-14 ENCOUNTER — OFFICE VISIT (OUTPATIENT)
Dept: CARDIOLOGY CLINIC | Age: 27
End: 2022-01-14
Payer: MEDICAID

## 2022-01-14 VITALS
WEIGHT: 230 LBS | DIASTOLIC BLOOD PRESSURE: 80 MMHG | SYSTOLIC BLOOD PRESSURE: 130 MMHG | BODY MASS INDEX: 33 KG/M2 | HEART RATE: 68 BPM

## 2022-01-14 DIAGNOSIS — I10 ESSENTIAL HYPERTENSION: ICD-10-CM

## 2022-01-14 DIAGNOSIS — R94.31 ABNORMAL EKG: Primary | ICD-10-CM

## 2022-01-14 PROCEDURE — G8417 CALC BMI ABV UP PARAM F/U: HCPCS | Performed by: INTERNAL MEDICINE

## 2022-01-14 PROCEDURE — G8482 FLU IMMUNIZE ORDER/ADMIN: HCPCS | Performed by: INTERNAL MEDICINE

## 2022-01-14 PROCEDURE — G8427 DOCREV CUR MEDS BY ELIG CLIN: HCPCS | Performed by: INTERNAL MEDICINE

## 2022-01-14 PROCEDURE — 99204 OFFICE O/P NEW MOD 45 MIN: CPT | Performed by: INTERNAL MEDICINE

## 2022-01-14 PROCEDURE — 1036F TOBACCO NON-USER: CPT | Performed by: INTERNAL MEDICINE

## 2022-01-14 ASSESSMENT — ENCOUNTER SYMPTOMS
APNEA: 0
SHORTNESS OF BREATH: 0
ABDOMINAL DISTENTION: 0
CHOKING: 0
CHEST TIGHTNESS: 0
COUGH: 0

## 2022-01-14 NOTE — PROGRESS NOTES
Subjective:      Patient ID: Christina Montes is a 23 y.o. male. HPI  Referred for abn ekg. Also with HTN. DM. No previous cardiac hx. Normal activities. No exertional sob. Some chest discomfort often when nervious, he feels due to anxiety. Not really with exertion. No pnd or orthopnea. No tachy/syncope. No palp. Feels well otherwise. No drugs. Past Medical History:   Diagnosis Date    Anxiety     Depression     Hypertension      No past surgical history on file. Social History     Socioeconomic History    Marital status: Single     Spouse name: Not on file    Number of children: 0    Years of education: 25    Highest education level: Bachelor's degree (e.g., BA, AB, BS)   Occupational History    Not on file   Tobacco Use    Smoking status: Never Smoker    Smokeless tobacco: Never Used   Substance and Sexual Activity    Alcohol use: Yes     Comment: Occasional, 2-3 x/wk, 1 mixed drink    Drug use: Never    Sexual activity: Not Currently     Partners: Female   Other Topics Concern    Not on file   Social History Narrative    Finishing grad school    Moved to [place] in 2020. Patient currently living by himself with his 2 cats, supporting himself off of savings and the inheritance left by his father. He is currently attempting to reengage and finish his graduate education in [employer] and working on his thesis. During school years, the patient did identify being placed in \"speech impediment classes\" which he believes were actually special education courses. No guns in the home, no [employer], no legal issues at this time     Social Determinants of Health     Financial Resource Strain:     Difficulty of Paying Living Expenses: Not on file   Food Insecurity:     Worried About Running Out of Food in the Last Year: Not on file    Machelle of Food in the Last Year: Not on file   Transportation Needs:     Lack of Transportation (Medical):  Not on file    Lack of Transportation (Non-Medical): Not on file   Physical Activity:     Days of Exercise per Week: Not on file    Minutes of Exercise per Session: Not on file   Stress:     Feeling of Stress : Not on file   Social Connections:     Frequency of Communication with Friends and Family: Not on file    Frequency of Social Gatherings with Friends and Family: Not on file    Attends Church Services: Not on file    Active Member of [address] [place] [address] or Organizations: Not on file    Attends Club or Organization Meetings: Not on file    Marital Status: Not on file   Intimate Partner Violence:     Fear of Current or Ex-Partner: Not on file    Emotionally Abused: Not on file    Physically Abused: Not on file    Sexually Abused: Not on file   Housing Stability:     Unable to Pay for Housing in the Last Year: Not on file    Number of [place] in the Last Year: Not on file    Unstable Housing in the Last Year: Not on file     FH reviewed,  Mother side Mi   Some early deaths. Vitals:    01/14/22 0915   BP: 130/80   Pulse: 68         Review of Systems   Constitutional: Negative for activity change and fatigue. Respiratory: Negative for apnea, cough, choking, chest tightness and shortness of breath. Cardiovascular: Negative for chest pain, palpitations and leg swelling. No PND or orthopnea. No tachycardia. Gastrointestinal: Negative for abdominal distention. Musculoskeletal: Negative for myalgias. Neurological: Negative for dizziness, syncope and light-headedness. Psychiatric/Behavioral: Negative for agitation, behavioral problems and confusion. All other systems reviewed and are negative. Objective:   Physical Exam  Constitutional:       General: He is not in acute distress. Appearance: Normal appearance. He is well-developed. HENT:      Head: Normocephalic and atraumatic. Right Ear: External ear normal.      Left Ear: External ear normal.   Neck:      Vascular: No JVD.

## 2022-01-17 ENCOUNTER — CARE COORDINATION (OUTPATIENT)
Dept: CARE COORDINATION | Age: 27
End: 2022-01-17

## 2022-01-19 NOTE — CARE COORDINATION
Ambulatory Care Coordination Note  1/19/2022  CM Risk Score: 1  Charlson 10 Year Mortality Risk Score: 4%     ACC: Zara Hilario, RN    Summary Note: Call to patient, BENNY on VM with this ACM name and number  Havent been able to reach pt and no return calls , per labs BG much better controlled. Lab Results   Component Value Date    LABA1C 5.6 01/03/2022     Lab Results   Component Value Date    .1 09/22/2021     Will dc fom panel at this time,if needs arise or pt returns call happy to add back to panel. Plan   dc from panel        Care Coordination Interventions    Program Enrollment: Complex Care  Referral from Primary Care Provider: Yes  Suggested Interventions and Community Resources         Goals Addressed    None         Prior to Admission medications    Medication Sig Start Date End Date Taking?  Authorizing Provider   omeprazole (PRILOSEC) 40 MG delayed release capsule TAKE ONE CAPSULE BY MOUTH EVERY MORNING BEFORE BREAKFAST 1/13/22   Sheri Habermann, MD   ondansetron (ZOFRAN) 4 MG tablet TAKE ONE TABLET BY MOUTH DAILY AS NEEDED FOR NAUSEA OR VOMITING 1/13/22   Sheri Habermann, MD   ARIPiprazole (ABILIFY) 5 MG tablet Take 1 tablet by mouth daily 1/6/22   Linda Pham MD   insulin glargine (LANTUS SOLOSTAR) 100 UNIT/ML injection pen Inject 20 Units into the skin daily 1/3/22   Sheri Habermann, MD   insulin lispro, 1 Unit Dial, (HUMALOG [facility]) 100 UNIT/ML SOPN 6 units SC with largest meal 1/3/22   Sheri Habermann, MD   escitalopram (LEXAPRO) 20 MG tablet TAKE ONE TABLET BY MOUTH DAILY 12/14/21   Sheri Habermann, MD   buPROPion (WELLBUTRIN XL) 150 MG extended release tablet TAKE ONE TABLET BY MOUTH EVERY MORNING 12/14/21   Sheri Habermann, MD   lisinopril (PRINIVIL;ZESTRIL) 10 MG tablet TAKE ONE TABLET BY MOUTH DAILY 12/3/21   Sheri Habermann, MD   atenolol (TENORMIN) 25 MG tablet TAKE ONE TABLET BY MOUTH DAILY 10/25/21   Sheri Habermann, MD   BLOOD GLUCOSE MONITOR 1 Device One Touch Verio Flex meter Historical Provider, MD   blood glucose monitor strips 125 strips by Other route Test 4 times a day & as needed for symptoms of irregular blood glucose. Dispense sufficient amount for indicated testing frequency plus additional to accommodate PRN testing needs     verio flex testing strips. Historical Provider, MD   Lancets Micro Thin 33G MISC by Does not apply route One touch Verio Flex    Historical Provider, MD   metFORMIN (GLUCOPHAGE-XR) 500 MG extended release tablet Take 4 tablets by mouth daily (with breakfast)  Patient taking differently: Take 2,000 mg by mouth Daily with supper  9/30/21   Phillip Rahman MD   Insulin Pen Needle ([employer] PEN NEEDLES) 31G X 6 MM MISC 1 each by Does not apply route 5 times daily May substitute brand based on insurance formulary 9/25/21   Lisa Reagan MD   atorvastatin (LIPITOR) 80 MG tablet Take 1 tablet by mouth nightly 9/25/21   Lisa Reagan MD   fluticasone (FLONASE) 50 MCG/ACT nasal spray 2 sprays by Each Nostril route daily 3/25/21   Phillip Rahman MD   tretinoin (RETIN-A) 0.025 % cream Apply topically nightly.  12/31/20   Phillip Rahman MD   aspirin-acetaminophen-caffeine (EXCEDRIN MIGRAINE) 873-780-31 MG per tablet Take 1 tablet by mouth every 6 hours as needed for Headaches 10/20/20   Phillip Rahman MD       Future Appointments   Date Time Provider Maria Esther Najera   2/9/2022  8:00 AM SCHEDULE, [facility] PLAIN ECHO RM 2 [facility] ECHO [facility]   2/9/2022  9:00 AM SCHEDULE, [facility] STRESS INJ RM 1 [facility] STRESS [facility]   2/9/2022  9:30 AM [address] NM RM 2 [facility] STRESS [facility]   2/9/2022 10:00 AM NUCLEAR STRESS ROOM POOL [facility] STRESS [facility]   2/10/2022  2:30 PM Felipa Malhotra MD [facility] MD [place]   2/15/2022  9:30 AM MD BLAYNE Terrell

## 2022-01-24 RX ORDER — BUPROPION HYDROCHLORIDE 150 MG/1
TABLET ORAL
Qty: 30 TABLET | Refills: 2 | OUTPATIENT
Start: 2022-01-24

## 2022-02-08 NOTE — PROGRESS NOTES
PSYCHIATRY PROGRESS NOTE    Macho Forde  1995  02/10/22  Face to Face time: 30 minutes  PCP: Saida Miles MD    CC:   Chief Complaint   Patient presents with    Anxiety    Depression     Patient is a 21-year-old male with significant past medical history of chronic headache, chronic back pain, hypertension, type 2 diabetes who presents to the psychiatry clinic today for evaluation and management of depression and anxiety. A:  Patient's presentation today appears very similar to his previous presentation, indicative of minimal change elicited by the addition of the aripiprazole. There does appear to be some minor decrease to his facial tics as well as a small improvement to his reported anxieties. We will work to increase his other medications and titrate them to better effect/coverage of his depression and anxiety. Specifically this month we will try to target his decrease in energy/motivation, decrease in focus, and sleep irregularities (oversleeping). Diagnosis:  Major depressive disorder, recurrent, mild  Generalized anxiety disorder with panic  Social anxiety disorder, severity unspecified  Tourette syndrome  Concern for autism spectrum disorder    P:   1. We will increase his Wellbutrin XL to 300 mg (was 150 mg) daily for treatment of depression and anxiety. Patient was cautioned on potential side effects of this medication including appetite suppression, insomnia, headaches/increased blood pressure, suicidality. 2.  We will plan to continue the patient on his current dose of Escitalopram 20 mg daily and aripiprazole 5 mg daily.     Medication Monitoring:    - PDMP reviewed: No interval change     Follow-up: 4 weeks    Safety: Pt was counseled on the potential for increased suicidal ideations and advised on potential options for dealing with these including hotlines, calling the office, or going to the nearest emergency room.      __________________________________________________________________________    S:   Patient endorsed that he had felt generally the same since his last visit. He did note that there were some minor feelings of improvement but these have been overshadowed by worsening of his back pain. Patient identifies that his back pain does influence some of the scores from his depressive questionnaire, noting that he very frequently has fatigue and frequent discomfort while performing tasks, leading to an increase in anhedonia. Patient endorsed a couple of panic episodes but that they have been relatively minor at this time. He did endorse an increase of \"weird dreams\", noting that they did not wake him from sleep but they were considered bizarre and almost nightmarish. He was questioning whether this would be a potential adverse effect of the medication, however he did not feel like it was a reason to stop the medication. 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 Patient identified that he has been oversleeping a little bit more of late, and in some scenarios this has encompassed most of the day. Patient denied any suicidality, homicidality, auditory visual hallucinations. ROS:  Review of Systems   Constitutional: Positive for fatigue. HENT: Negative. Eyes: Negative. Respiratory: Negative. Cardiovascular: Negative. Gastrointestinal: Negative. Endocrine: Negative. Genitourinary: Negative. Musculoskeletal: Positive for back pain. Skin: Negative. Allergic/Immunologic: Negative. Neurological: Positive for headaches. Hematological: Negative. Psychiatric/Behavioral: Positive for decreased concentration and dysphoric mood. The patient is nervous/anxious.          Brief Medical Hx:   Patient Active Problem List   Diagnosis    Allergic rhinitis    Generalized anxiety disorder with panic attacks    Chronic daily headache    Mid back pain, chronic    Essential hypertension    Mucous retention cyst of maxillary sinus    Diabetes mellitus type 2, insulin dependent (HCC)    Mild episode of recurrent major depressive disorder (HCC)    Tourette syndrome    Autism spectrum disorder        Brief Psych Hx:  Hosp: Denied  Diagnoses: Bipolar 2 disorder, generalized anxiety disorder  Currrent medications:  Bupropion, escitalopram  Med trials: lamotrigine,    Outpt: Previously worked with NP Iveth Victoria  NSSI: Denied  Suicide Attempts: Denied    O:  Wt Readings from Last 3 Encounters:   02/10/22 233 lb (105.7 kg)   01/14/22 230 lb (104.3 kg)   01/06/22 231 lb (104.8 kg)       Vitals:    02/10/22 1500   BP: 122/86   Pulse: 74   Resp: 12   Weight: 233 lb (105.7 kg)       Mental Status Exam:   Appearance:    Appropriately dressed  Motor: Facial taking present especially in eyes squinching.   May be minorly decreased from previous  Eye Contact: Good  Speech:    Appropriate rate and tone  Language:   Appropriate diction  Mood/Affect:   \"Still down\"/blunted affect  Thought Process:    Linear, logical  Thought Content:    Topic appropriate, no SI/HI  Hallucinations:   Denied, not seem to be responding internal stimuli  Associations:   Intact  Attention/Concentration:   Intact  Orientation:    Alert and oriented x4  Memory:   Intact  Fund of Knowledge:    Appropriate for age and education  Insight/Judgement:   Intact/intact      FAREED-7 SCREENING 2/10/2022 1/6/2022   Feeling nervous, anxious, or on edge Nearly every day Nearly every day   Not being able to stop or control worrying More than half the days Nearly every day   Worrying too much about different things More than half the days Nearly every day   Trouble relaxing Nearly every day More than half the days   Being so restless that it is hard to sit still Several days Several days   Becoming easily annoyed or irritable Nearly every day Nearly every day   Feeling afraid as if something awful might happen Several days More than half the days   FAREED-7 Total Score 15 17   How difficult have these problems made it for you to do your work, take care of things at home, or get along with other people? - Very difficult     PHQ-9 Questionaire 2/10/2022 1/6/2022   Little interest or pleasure in doing things 1 2   Feeling down, depressed, or hopeless 2 2   Trouble falling or staying asleep, or sleeping too much 3 3   Feeling tired or having little energy 3 3   Poor appetite or overeating 1 1   Feeling bad about yourself - or that you are a failure or have let yourself or your family down 1 1   Trouble concentrating on things, such as reading the newspaper or watching television 2 1   Moving or speaking so slowly that other people could have noticed. Or the opposite - being so fidgety or restless that you have been moving around a lot more than usual 1 0   Thoughts that you would be better off dead, or of hurting yourself in some way 0 0   PHQ-9 Total Score 14 13   If you checked off any problems, how difficult have these problems made it for you to do your work, take care of things at home, or get along with other people? - 1        AIMS Scale  Facial and Oral Movements(AIMS)  Muscles of Facial Expression: Minimal  Lips and Perioral Area: None, normal  Jaw: None, normal  Tongue: None, normal  Extremity Movements(AIMS)  Upper (arms, wrists, hands, fingers): None, normal  Lower (legs, knees, ankles, toes): None, normal  Trunk Movements(AIMS)  Neck, shoulders, hips: None, normal  Overall Severity(AIMS)  Severity of abnormal movements (highest score from 1-7): 1  Incapacitation due to abnormal movements: None, normal  Patient's awareness of abnormal movements (rate only patient's report):  Aware, no distress  Total of items 1-7: 1  Copy above value to this cell: 1  Dental Status(AIMS)  Current problems with teeth and/or dentures?: No  Does patient usually wear dentures?: No     Labs:     Office Visit on 01/03/2022   Component Date Value Ref Range Status    Hemoglobin A1C 01/03/2022 5.6  % Final       EKG: 10/14/2021 QTc 400        Sierra Henao MD  Psychiatrist

## 2022-02-09 ENCOUNTER — HOSPITAL ENCOUNTER (OUTPATIENT)
Dept: NON INVASIVE DIAGNOSTICS | Age: 27
Discharge: HOME | End: 2022-02-09
Payer: MEDICAID

## 2022-02-09 DIAGNOSIS — I10 ESSENTIAL HYPERTENSION: ICD-10-CM

## 2022-02-09 DIAGNOSIS — R94.31 ABNORMAL EKG: ICD-10-CM

## 2022-02-09 LAB
LV EF: 66 %
LVEF MODALITY: NORMAL

## 2022-02-09 PROCEDURE — A9502 TC99M TETROFOSMIN: HCPCS | Performed by: INTERNAL MEDICINE

## 2022-02-09 PROCEDURE — 93306 TTE W/DOPPLER COMPLETE: CPT

## 2022-02-09 PROCEDURE — 2580000003 HC RX 258: Performed by: INTERNAL MEDICINE

## 2022-02-09 PROCEDURE — 93017 CV STRESS TEST TRACING ONLY: CPT

## 2022-02-09 PROCEDURE — 78452 HT MUSCLE IMAGE SPECT MULT: CPT

## 2022-02-09 PROCEDURE — 3430000000 HC RX DIAGNOSTIC RADIOPHARMACEUTICAL: Performed by: INTERNAL MEDICINE

## 2022-02-09 RX ORDER — SODIUM CHLORIDE 0.9 % (FLUSH) 0.9 %
10 SYRINGE (ML) INJECTION 2 TIMES DAILY
Status: DISCONTINUED | OUTPATIENT
Start: 2022-02-09 | End: 2022-02-10 | Stop reason: HOSPADM

## 2022-02-09 RX ADMIN — TETROFOSMIN 30 MILLICURIE: 1.38 INJECTION, POWDER, LYOPHILIZED, FOR SOLUTION INTRAVENOUS at 10:29

## 2022-02-09 RX ADMIN — TETROFOSMIN 10 MILLICURIE: 1.38 INJECTION, POWDER, LYOPHILIZED, FOR SOLUTION INTRAVENOUS at 09:03

## 2022-02-09 RX ADMIN — Medication 10 ML: at 09:04

## 2022-02-09 RX ADMIN — Medication 10 ML: at 10:29

## 2022-02-10 ENCOUNTER — OFFICE VISIT (OUTPATIENT)
Dept: PSYCHIATRY | Age: 27
End: 2022-02-10
Payer: MEDICAID

## 2022-02-10 VITALS
HEART RATE: 74 BPM | BODY MASS INDEX: 33.43 KG/M2 | WEIGHT: 233 LBS | SYSTOLIC BLOOD PRESSURE: 122 MMHG | RESPIRATION RATE: 12 BRPM | DIASTOLIC BLOOD PRESSURE: 86 MMHG

## 2022-02-10 DIAGNOSIS — F41.1 GENERALIZED ANXIETY DISORDER WITH PANIC ATTACKS: ICD-10-CM

## 2022-02-10 DIAGNOSIS — F84.0 AUTISM SPECTRUM DISORDER: ICD-10-CM

## 2022-02-10 DIAGNOSIS — F33.0 MILD EPISODE OF RECURRENT MAJOR DEPRESSIVE DISORDER (HCC): Primary | ICD-10-CM

## 2022-02-10 DIAGNOSIS — F41.0 GENERALIZED ANXIETY DISORDER WITH PANIC ATTACKS: ICD-10-CM

## 2022-02-10 DIAGNOSIS — F95.2 TOURETTE SYNDROME: ICD-10-CM

## 2022-02-10 PROCEDURE — G8417 CALC BMI ABV UP PARAM F/U: HCPCS | Performed by: STUDENT IN AN ORGANIZED HEALTH CARE EDUCATION/TRAINING PROGRAM

## 2022-02-10 PROCEDURE — 1036F TOBACCO NON-USER: CPT | Performed by: STUDENT IN AN ORGANIZED HEALTH CARE EDUCATION/TRAINING PROGRAM

## 2022-02-10 PROCEDURE — 99214 OFFICE O/P EST MOD 30 MIN: CPT | Performed by: STUDENT IN AN ORGANIZED HEALTH CARE EDUCATION/TRAINING PROGRAM

## 2022-02-10 PROCEDURE — G8482 FLU IMMUNIZE ORDER/ADMIN: HCPCS | Performed by: STUDENT IN AN ORGANIZED HEALTH CARE EDUCATION/TRAINING PROGRAM

## 2022-02-10 PROCEDURE — G8427 DOCREV CUR MEDS BY ELIG CLIN: HCPCS | Performed by: STUDENT IN AN ORGANIZED HEALTH CARE EDUCATION/TRAINING PROGRAM

## 2022-02-10 RX ORDER — BUPROPION HYDROCHLORIDE 300 MG/1
TABLET ORAL
Qty: 30 TABLET | Refills: 2 | Status: SHIPPED | OUTPATIENT
Start: 2022-02-10 | End: 2022-05-09

## 2022-02-10 ASSESSMENT — PATIENT HEALTH QUESTIONNAIRE - PHQ9
SUM OF ALL RESPONSES TO PHQ9 QUESTIONS 1 & 2: 3
SUM OF ALL RESPONSES TO PHQ QUESTIONS 1-9: 14
4. FEELING TIRED OR HAVING LITTLE ENERGY: 3
1. LITTLE INTEREST OR PLEASURE IN DOING THINGS: 1
9. THOUGHTS THAT YOU WOULD BE BETTER OFF DEAD, OR OF HURTING YOURSELF: 0
8. MOVING OR SPEAKING SO SLOWLY THAT OTHER PEOPLE COULD HAVE NOTICED. OR THE OPPOSITE, BEING SO FIGETY OR RESTLESS THAT YOU HAVE BEEN MOVING AROUND A LOT MORE THAN USUAL: 1
2. FEELING DOWN, DEPRESSED OR HOPELESS: 2
5. POOR APPETITE OR OVEREATING: 1
6. FEELING BAD ABOUT YOURSELF - OR THAT YOU ARE A FAILURE OR HAVE LET YOURSELF OR YOUR FAMILY DOWN: 1
7. TROUBLE CONCENTRATING ON THINGS, SUCH AS READING THE NEWSPAPER OR WATCHING TELEVISION: 2
SUM OF ALL RESPONSES TO PHQ QUESTIONS 1-9: 14
SUM OF ALL RESPONSES TO PHQ QUESTIONS 1-9: 14
3. TROUBLE FALLING OR STAYING ASLEEP: 3
SUM OF ALL RESPONSES TO PHQ QUESTIONS 1-9: 14

## 2022-02-10 ASSESSMENT — ENCOUNTER SYMPTOMS
GASTROINTESTINAL NEGATIVE: 1
RESPIRATORY NEGATIVE: 1
EYES NEGATIVE: 1
BACK PAIN: 1
ALLERGIC/IMMUNOLOGIC NEGATIVE: 1

## 2022-02-10 ASSESSMENT — ANXIETY QUESTIONNAIRES
2. NOT BEING ABLE TO STOP OR CONTROL WORRYING: 2
6. BECOMING EASILY ANNOYED OR IRRITABLE: 3
1. FEELING NERVOUS, ANXIOUS, OR ON EDGE: 3
3. WORRYING TOO MUCH ABOUT DIFFERENT THINGS: 2
4. TROUBLE RELAXING: 3
7. FEELING AFRAID AS IF SOMETHING AWFUL MIGHT HAPPEN: 1
GAD7 TOTAL SCORE: 15
5. BEING SO RESTLESS THAT IT IS HARD TO SIT STILL: 1

## 2022-02-15 ENCOUNTER — OFFICE VISIT (OUTPATIENT)
Dept: INTERNAL MEDICINE CLINIC | Age: 27
End: 2022-02-15
Payer: MEDICAID

## 2022-02-15 VITALS
SYSTOLIC BLOOD PRESSURE: 108 MMHG | OXYGEN SATURATION: 96 % | WEIGHT: 234 LBS | TEMPERATURE: 97 F | DIASTOLIC BLOOD PRESSURE: 64 MMHG | BODY MASS INDEX: 33.58 KG/M2 | HEART RATE: 86 BPM

## 2022-02-15 DIAGNOSIS — J34.89 NASAL OBSTRUCTION: ICD-10-CM

## 2022-02-15 DIAGNOSIS — K62.5 RECTAL BLEEDING: ICD-10-CM

## 2022-02-15 DIAGNOSIS — M54.9 MID BACK PAIN, CHRONIC: ICD-10-CM

## 2022-02-15 DIAGNOSIS — Z79.4 DIABETES MELLITUS TYPE 2, INSULIN DEPENDENT (HCC): Primary | ICD-10-CM

## 2022-02-15 DIAGNOSIS — G89.29 MID BACK PAIN, CHRONIC: ICD-10-CM

## 2022-02-15 DIAGNOSIS — Z01.818 PREOP EXAM FOR INTERNAL MEDICINE: ICD-10-CM

## 2022-02-15 DIAGNOSIS — J34.1 MUCOUS RETENTION CYST OF MAXILLARY SINUS: ICD-10-CM

## 2022-02-15 DIAGNOSIS — E11.9 DIABETES MELLITUS TYPE 2, INSULIN DEPENDENT (HCC): Primary | ICD-10-CM

## 2022-02-15 PROCEDURE — G8417 CALC BMI ABV UP PARAM F/U: HCPCS | Performed by: INTERNAL MEDICINE

## 2022-02-15 PROCEDURE — 2022F DILAT RTA XM EVC RTNOPTHY: CPT | Performed by: INTERNAL MEDICINE

## 2022-02-15 PROCEDURE — 3044F HG A1C LEVEL LT 7.0%: CPT | Performed by: INTERNAL MEDICINE

## 2022-02-15 PROCEDURE — G8427 DOCREV CUR MEDS BY ELIG CLIN: HCPCS | Performed by: INTERNAL MEDICINE

## 2022-02-15 PROCEDURE — G8482 FLU IMMUNIZE ORDER/ADMIN: HCPCS | Performed by: INTERNAL MEDICINE

## 2022-02-15 PROCEDURE — 99214 OFFICE O/P EST MOD 30 MIN: CPT | Performed by: INTERNAL MEDICINE

## 2022-02-15 PROCEDURE — 1036F TOBACCO NON-USER: CPT | Performed by: INTERNAL MEDICINE

## 2022-02-15 ASSESSMENT — PATIENT HEALTH QUESTIONNAIRE - PHQ9
SUM OF ALL RESPONSES TO PHQ QUESTIONS 1-9: 14
5. POOR APPETITE OR OVEREATING: 1
SUM OF ALL RESPONSES TO PHQ9 QUESTIONS 1 & 2: 4
6. FEELING BAD ABOUT YOURSELF - OR THAT YOU ARE A FAILURE OR HAVE LET YOURSELF OR YOUR FAMILY DOWN: 1
8. MOVING OR SPEAKING SO SLOWLY THAT OTHER PEOPLE COULD HAVE NOTICED. OR THE OPPOSITE, BEING SO FIGETY OR RESTLESS THAT YOU HAVE BEEN MOVING AROUND A LOT MORE THAN USUAL: 0
3. TROUBLE FALLING OR STAYING ASLEEP: 3
7. TROUBLE CONCENTRATING ON THINGS, SUCH AS READING THE NEWSPAPER OR WATCHING TELEVISION: 2
10. IF YOU CHECKED OFF ANY PROBLEMS, HOW DIFFICULT HAVE THESE PROBLEMS MADE IT FOR YOU TO DO YOUR WORK, TAKE CARE OF THINGS AT HOME, OR GET ALONG WITH OTHER PEOPLE: 0
2. FEELING DOWN, DEPRESSED OR HOPELESS: 2
SUM OF ALL RESPONSES TO PHQ QUESTIONS 1-9: 14
4. FEELING TIRED OR HAVING LITTLE ENERGY: 3
SUM OF ALL RESPONSES TO PHQ QUESTIONS 1-9: 14
1. LITTLE INTEREST OR PLEASURE IN DOING THINGS: 2
9. THOUGHTS THAT YOU WOULD BE BETTER OFF DEAD, OR OF HURTING YOURSELF: 0
SUM OF ALL RESPONSES TO PHQ QUESTIONS 1-9: 14

## 2022-02-15 ASSESSMENT — ENCOUNTER SYMPTOMS
NAUSEA: 0
WHEEZING: 0
COUGH: 0
CONSTIPATION: 0
SHORTNESS OF BREATH: 0
VOMITING: 0
ABDOMINAL PAIN: 0
BACK PAIN: 1

## 2022-02-15 NOTE — PROGRESS NOTES
Silvina Schuster (:  1995) is a 33 y.o. male,Established patient, here for evaluation of the following chief complaint(s):  Diabetes (BS at home mid 100's)      ASSESSMENT/PLAN:  1. Diabetes mellitus type 2, insulin dependent ([facility] [address]  Assessment & Plan:  A1C 5.6% from 14% since diagnosis in September. Think we have gained good enough/aggressive control enough to back way off on insulin with goal of getting off completely. Will drop mealtime insulin altogether and continue 20 units of lantus daily. RTC 6 weeks. Continue decrease if able. He is on statin (was put on by hospital). He is on lisinopril. He has seen cardiology and had a normal stress test.   2. Mid back pain, chronic  Assessment & Plan:  Some listhesis on xray. See imaging tab. No radicular symptoms. Will refer to PT. Orders:  -     [facility] [place] ([facility])- [facility]  3. Rectal bleeding  Assessment & Plan:  Has had colonoscopy. Was told had hemorrhoids. Discussed dietary modifications and how to manage stools. May need referral.        Return in about 6 weeks (around 3/29/2022) for DM2, VV okay. SUBJECTIVE/OBJECTIVE:  HPI    Blood sugars have looked good. We dropped down to lantus 20 units once a day and 6 units of lispro with BG all under 200. Continues with mid back tightness. Has had chronic intermittent rectal bleeding. Has had colonoscopy 2 years ago just before moved down to [place]    Review of Systems   Constitutional: Negative for chills, fatigue and fever. Respiratory: Negative for cough, shortness of breath and wheezing. Cardiovascular: Negative for chest pain, palpitations and leg swelling. Gastrointestinal: Negative for abdominal pain, constipation, nausea and vomiting. Musculoskeletal: Positive for back pain. Skin: Negative for rash. Neurological: Positive for headaches.        Current Outpatient Medications on File Prior to Visit   Medication Sig Dispense Refill    buPROPion (WELLBUTRIN XL) 300 MG extended release tablet TAKE ONE TABLET BY MOUTH EVERY MORNING 30 tablet 2    omeprazole (PRILOSEC) 40 MG delayed release capsule TAKE ONE CAPSULE BY MOUTH EVERY MORNING BEFORE BREAKFAST 30 capsule 1    ondansetron (ZOFRAN) 4 MG tablet TAKE ONE TABLET BY MOUTH DAILY AS NEEDED FOR NAUSEA OR VOMITING 30 tablet 0    ARIPiprazole (ABILIFY) 5 MG tablet Take 1 tablet by mouth daily 30 tablet 3    insulin glargine (LANTUS SOLOSTAR) 100 UNIT/ML injection pen Inject 20 Units into the skin daily 5 pen 3    insulin lispro, 1 Unit Dial, (HUMALOG [facility]) 100 UNIT/ML SOPN 6 units SC with largest meal 5 pen 2    escitalopram (LEXAPRO) 20 MG tablet TAKE ONE TABLET BY MOUTH DAILY 30 tablet 5    lisinopril (PRINIVIL;ZESTRIL) 10 MG tablet TAKE ONE TABLET BY MOUTH DAILY 30 tablet 5    atenolol (TENORMIN) 25 MG tablet TAKE ONE TABLET BY MOUTH DAILY 30 tablet 5    BLOOD GLUCOSE MONITOR 1 Device One Touch Verio Flex meter      blood glucose monitor strips 125 strips by Other route Test 4 times a day & as needed for symptoms of irregular blood glucose. Dispense sufficient amount for indicated testing frequency plus additional to accommodate PRN testing needs     verio flex testing strips.       Lancets Micro Thin 33G MISC by Does not apply route One touch Verio Flex      metFORMIN (GLUCOPHAGE-XR) 500 MG extended release tablet Take 4 tablets by mouth daily (with breakfast) (Patient taking differently: Take 2,000 mg by mouth Daily with supper ) 120 tablet 5    Insulin Pen Needle ([employer] PEN NEEDLES) 31G X 6 MM MISC 1 each by Does not apply route 5 times daily May substitute brand based on insurance formulary 100 each 3    atorvastatin (LIPITOR) 80 MG tablet Take 1 tablet by mouth nightly 30 tablet 3    fluticasone (FLONASE) 50 MCG/ACT nasal spray 2 sprays by Each Nostril route daily 1 Bottle 5    aspirin-acetaminophen-caffeine (EXCEDRIN MIGRAINE) 250-250-65 MG per tablet Take 1 tablet by mouth every 6 hours as needed for Headaches 90 tablet 3     No current facility-administered medications on file prior to visit. Vitals:    02/15/22 0934   BP: 108/64   Pulse: 86   Temp: 97 °F (36.1 °C)   SpO2: 96%     Physical Exam  Constitutional:       General: He is not in acute distress. Appearance: Normal appearance. HENT:      Head: Normocephalic and atraumatic. Right Ear: External ear normal.      Left Ear: External ear normal.      Nose: Nose normal.      Mouth/Throat:      Mouth: Mucous membranes are moist.      Pharynx: Oropharynx is clear. Eyes:      General: No scleral icterus. Conjunctiva/sclera: Conjunctivae normal.   Cardiovascular:      Rate and Rhythm: Normal rate and regular rhythm. Pulses: Normal pulses. Heart sounds: Normal heart sounds. No murmur heard. No friction rub. No gallop. Pulmonary:      Effort: Pulmonary effort is normal.      Breath sounds: Normal breath sounds. No wheezing, rhonchi or rales. Abdominal:      General: Abdomen is flat. Bowel sounds are normal.      Palpations: Abdomen is soft. Musculoskeletal:         General: Normal range of motion. Cervical back: Normal range of motion and neck supple. Right lower leg: No edema. Left lower leg: No edema. Skin:     General: Skin is warm and dry. Findings: No rash. Neurological:      General: No focal deficit present. Mental Status: He is alert. Mental status is at baseline. Coordination: Coordination normal.      Gait: Gait normal.   Psychiatric:         Mood and Affect: Mood normal.         Behavior: Behavior normal.           On this date 2/15/2022 I have spent 30 minutes reviewing previous notes, test results and face to face with the patient discussing the diagnosis and importance of compliance with the treatment plan as well as documenting on the day of the visit. An electronic signature was used to authenticate this note.     --Gracie Joseph MD [facility]         ADDENDUM -   Scheduled for surgery with Dr. Mitchel Dey on 3/14/2022. He has nasal obstruction causing chronic sinus issues and difficulty breathing as well as snoring. This has been longstanding. Conservative measures have not been effective. No anesthesia in the past.  Good exercise tolerance. No abnormal bleeding. No history of blood clot. No objection to blood transfusion if necessary. He has an acceptable risk for a low risk surgery. He has well-controlled diabetes. He is is had a recent cardiac work-up that included a nuclear stress test which was a low risk scan. This was done because of some T wave inversions inferiorly. He saw cardiology. No contraindications to planned surgery. He is medically optimized.

## 2022-02-15 NOTE — ASSESSMENT & PLAN NOTE
A1C 5.6% from 14% since diagnosis in September. Think we have gained good enough/aggressive control enough to back way off on insulin with goal of getting off completely. Will drop mealtime insulin altogether and continue 20 units of lantus daily. RTC 6 weeks. Continue decrease if able. He is on statin (was put on by hospital). He is on lisinopril.  He has seen cardiology and had a normal stress test.

## 2022-02-15 NOTE — ASSESSMENT & PLAN NOTE
Has had colonoscopy. Was told had hemorrhoids. Discussed dietary modifications and how to manage stools.  May need referral.

## 2022-02-16 ENCOUNTER — OFFICE VISIT (OUTPATIENT)
Dept: ENT CLINIC | Age: 27
End: 2022-02-16
Payer: MEDICAID

## 2022-02-16 VITALS
DIASTOLIC BLOOD PRESSURE: 85 MMHG | BODY MASS INDEX: 33.5 KG/M2 | WEIGHT: 234 LBS | HEART RATE: 75 BPM | HEIGHT: 70 IN | TEMPERATURE: 97 F | SYSTOLIC BLOOD PRESSURE: 126 MMHG

## 2022-02-16 DIAGNOSIS — J34.2 DNS (DEVIATED NASAL SEPTUM): Primary | ICD-10-CM

## 2022-02-16 DIAGNOSIS — J34.3 NASAL TURBINATE HYPERTROPHY: ICD-10-CM

## 2022-02-16 DIAGNOSIS — J34.89 NASAL OBSTRUCTION: ICD-10-CM

## 2022-02-16 PROCEDURE — G8427 DOCREV CUR MEDS BY ELIG CLIN: HCPCS | Performed by: OTOLARYNGOLOGY

## 2022-02-16 PROCEDURE — 1036F TOBACCO NON-USER: CPT | Performed by: OTOLARYNGOLOGY

## 2022-02-16 PROCEDURE — G8482 FLU IMMUNIZE ORDER/ADMIN: HCPCS | Performed by: OTOLARYNGOLOGY

## 2022-02-16 PROCEDURE — G8417 CALC BMI ABV UP PARAM F/U: HCPCS | Performed by: OTOLARYNGOLOGY

## 2022-02-16 PROCEDURE — 99214 OFFICE O/P EST MOD 30 MIN: CPT | Performed by: OTOLARYNGOLOGY

## 2022-02-16 ASSESSMENT — ENCOUNTER SYMPTOMS
VOICE CHANGE: 0
COUGH: 0
COLOR CHANGE: 0
NAUSEA: 0
STRIDOR: 0
SHORTNESS OF BREATH: 0
TROUBLE SWALLOWING: 0
EYE PAIN: 0
DIARRHEA: 0
PHOTOPHOBIA: 0
SINUS PAIN: 0
EYE REDNESS: 0
RHINORRHEA: 0
EYE ITCHING: 0
FACIAL SWELLING: 0
SORE THROAT: 0
SINUS PRESSURE: 0
CHOKING: 0

## 2022-02-16 NOTE — LETTER
[facility]    Surgery Schedule Request Form: 02/16/22  [address] Alessandra Kaufman                                                               Post op 3/22/22    DATE OF SURGERY: 3/14/22    TIME OF SURGERY:  12:30 pm            CONF #: ____________________       Patient Information:    Patient name: Jorge Guerrero    YOB: 1995 Age/Sex:27 y.o./male    SS #:[id number]    Wt Readings from Last 1 Encounters:   02/16/22 234 lb (106.1 kg)       Telephone Information:   Mobile [phone]     Home [phone]     Surgeon & Procedure Information:     Lead surgeon: Obdulio Moya Co-Surgeon: no  Phone: [phone] Fax: [phone]  PCP: Emily Frederick MD    Diagnosis: 1. Nasal obstruction   J34.89   2. Deviated nasal septum    J3.2   3. Nasal turbinate   J34.3    Procedure name/CPT: Bilateral Inferior Turbinate Reduction (37702-67) and Septoplasty (92486)    Procedure length: 30 minutes Anesthesia: General    Special Equipment: no    Patient Status: SDS (OP)    Primary Payor Plan: Alondra Montenegro  Member ID: [id number]   [address] [place] [address] Drive name: Mynor BARBOSA Vaccinated? Yes    [] Implement attached clinical orders for patient.       Electronically signed by Olinda Calvo DO on 2/16/2022 at 3:02 PM Leigh B fracture of lateral malleolus and posterior malleolus fracture, widening of syndesmosis with external rotation stress

## 2022-02-16 NOTE — PROGRESS NOTES
[facility]  [address] [address] [facility], [address] [place] [address]  P: [phone]  F: [phone]       Patient     Johnie Lindsey  1995    ChiefComplaint     Chief Complaint   Patient presents with    Surgery     Patient is here to re-evaluate if he still can have the surgery that had to be canceled       History of Present Illness     Johnie Lindsey is a pleasant 33 y.o. male known to me for nasal obstruction, deviated nasal septum, turbinate hypertrophy, allergic rhinitis. He underwent previous CT of the sinuses revealing a small right-sided maxillary mucous retention cyst.  No other evidence of chronic sinusitis. He was previously interested in pursuing surgery. He is here today to discuss moving forward with surgery to improve his nasal breathing. Past Medical History     Past Medical History:   Diagnosis Date    Anxiety     Depression     Hypertension        Past Surgical History     No past surgical history on file. Family History     Family History   Problem Relation Age of Onset    Cancer Mother         melanoma    Hypertension Mother     Diabetes Mother     Other Mother         concerns for hoarding    Hypertension Father     Anxiety Disorder Father     Diabetes Father     Suicide Attempts Father         pills       Social History     Social History     Socioeconomic History    Marital status: Single     Spouse name: Not on file    Number of children: 0    Years of education: 25    Highest education level: Bachelor's degree (e.g., BA, AB, BS)   Occupational History    Not on file   Tobacco Use    Smoking status: Never Smoker    Smokeless tobacco: Never Used   Substance and Sexual Activity    Alcohol use: Yes     Comment: Occasional, 2-3 x/wk, 1 mixed drink    Drug use: Never    Sexual activity: Not Currently     Partners: Female   Other Topics Concern    Not on file   Social History Narrative    Finishing grad school    Moved to [place] in 2020. Patient currently living by himself with his 2 cats, supporting himself off of savings and the inheritance left by his father. He is currently attempting to reengage and finish his graduate education in [employer] and working on his thesis. During school years, the patient did identify being placed in \"speech impediment classes\" which he believes were actually special education courses. No guns in the home, no [employer], no legal issues at this time     Social Determinants of Health     Financial Resource Strain:     Difficulty of Paying Living Expenses: Not on file   Food Insecurity:     Worried About Running Out of Food in the Last Year: Not on file    Machelle of Food in the Last Year: Not on file   Transportation Needs:     Lack of Transportation (Medical): Not on file    Lack of Transportation (Non-Medical):  Not on file   Physical Activity:     Days of Exercise per Week: Not on file    Minutes of Exercise per Session: Not on file   Stress:     Feeling of Stress : Not on file   Social Connections:     Frequency of Communication with Friends and Family: Not on file    Frequency of Social Gatherings with Friends and Family: Not on file    Attends [employer] Services: Not on file    Active Member of [address] [place] [address] [employer] or Organizations: Not on file    Attends Club or Organization Meetings: Not on file    Marital Status: Not on file   Intimate Partner Violence:     Fear of Current or Ex-Partner: Not on file    Emotionally Abused: Not on file    Physically Abused: Not on file    Sexually Abused: Not on file   Housing Stability:     Unable to Pay for Housing in the Last Year: Not on file    Number of [place] in the Last Year: Not on file    Unstable Housing in the Last Year: Not on file       Allergies     Allergies   Allergen Reactions    No Known Allergies        Medications     Current Outpatient Medications   Medication Sig Dispense Refill    buPROPion (WELLBUTRIN XL) 300 MG extended release tablet TAKE ONE TABLET BY MOUTH EVERY MORNING 30 tablet 2    omeprazole (PRILOSEC) 40 MG delayed release capsule TAKE ONE CAPSULE BY MOUTH EVERY MORNING BEFORE BREAKFAST 30 capsule 1    ondansetron (ZOFRAN) 4 MG tablet TAKE ONE TABLET BY MOUTH DAILY AS NEEDED FOR NAUSEA OR VOMITING 30 tablet 0    ARIPiprazole (ABILIFY) 5 MG tablet Take 1 tablet by mouth daily 30 tablet 3    insulin glargine (LANTUS SOLOSTAR) 100 UNIT/ML injection pen Inject 20 Units into the skin daily 5 pen 3    escitalopram (LEXAPRO) 20 MG tablet TAKE ONE TABLET BY MOUTH DAILY 30 tablet 5    lisinopril (PRINIVIL;ZESTRIL) 10 MG tablet TAKE ONE TABLET BY MOUTH DAILY 30 tablet 5    atenolol (TENORMIN) 25 MG tablet TAKE ONE TABLET BY MOUTH DAILY 30 tablet 5    BLOOD GLUCOSE MONITOR 1 Device One Touch Verio Flex meter      blood glucose monitor strips 125 strips by Other route Test 4 times a day & as needed for symptoms of irregular blood glucose. Dispense sufficient amount for indicated testing frequency plus additional to accommodate PRN testing needs     verio flex testing strips.  Lancets Micro Thin 33G MISC by Does not apply route One touch Verio Flex      metFORMIN (GLUCOPHAGE-XR) 500 MG extended release tablet Take 4 tablets by mouth daily (with breakfast) (Patient taking differently: Take 2,000 mg by mouth Daily with supper ) 120 tablet 5    Insulin Pen Needle ([employer] PEN NEEDLES) 31G X 6 MM MISC 1 each by Does not apply route 5 times daily May substitute brand based on insurance formulary 100 each 3    atorvastatin (LIPITOR) 80 MG tablet Take 1 tablet by mouth nightly 30 tablet 3    fluticasone (FLONASE) 50 MCG/ACT nasal spray 2 sprays by Each Nostril route daily 1 Bottle 5    aspirin-acetaminophen-caffeine (EXCEDRIN MIGRAINE) 250-250-65 MG per tablet Take 1 tablet by mouth every 6 hours as needed for Headaches 90 tablet 3     No current facility-administered medications for this visit.        Review of Systems Review of Systems   Constitutional: Negative for chills, fatigue and fever. HENT: Positive for congestion. Negative for ear discharge, ear pain, facial swelling, hearing loss, nosebleeds, postnasal drip, rhinorrhea, sinus pressure, sinus pain, sneezing, sore throat, tinnitus, trouble swallowing and voice change. Eyes: Negative for photophobia, pain, redness, itching and visual disturbance. Respiratory: Negative for cough, choking, shortness of breath and stridor. Gastrointestinal: Negative for diarrhea and nausea. Musculoskeletal: Negative for neck pain and neck stiffness. Skin: Negative for color change and rash. Neurological: Negative for dizziness, facial asymmetry and light-headedness. Hematological: Negative for adenopathy. Psychiatric/Behavioral: Negative for agitation and confusion. PhysicalExam     Vitals:    02/16/22 1449   BP: 126/85   Pulse: 75   Temp: 97 °F (36.1 °C)       Physical Exam  Constitutional:       Appearance: He is well-developed. HENT:      Head: Normocephalic and atraumatic. Jaw: No trismus. Right Ear: Tympanic membrane, ear canal and external ear normal. No drainage. No middle ear effusion. Tympanic membrane is not perforated. Left Ear: Tympanic membrane, ear canal and external ear normal. No drainage. No middle ear effusion. Tympanic membrane is not perforated. Nose: Septal deviation (3+ left) present. No mucosal edema or rhinorrhea. Right Turbinates: Enlarged and swollen. Left Turbinates: Enlarged and swollen. Mouth/Throat:      Dentition: Normal dentition. Pharynx: Uvula midline. No oropharyngeal exudate. Eyes:      General: No scleral icterus. Right eye: No discharge. Left eye: No discharge. Pupils: Pupils are equal, round, and reactive to light. Neck:      Thyroid: No thyromegaly. Trachea: Phonation normal. No tracheal deviation.    Pulmonary:      Effort: Pulmonary effort is normal. No respiratory distress. Breath sounds: No stridor. Musculoskeletal:      Cervical back: Neck supple. Lymphadenopathy:      Cervical: No cervical adenopathy. Skin:     General: Skin is warm and dry. Neurological:      Mental Status: He is alert and oriented to person, place, and time. Cranial Nerves: No cranial nerve deficit. Psychiatric:         Behavior: Behavior normal.           Procedure           Assessment and Plan     1. DNS (deviated nasal septum)  Patient is here to discuss surgical intervention for his nasal obstruction. Unfortunately, his previous surgery had to be canceled due to preoperative testing bringing up issues with diabetes and his pancreas. He is now cleared to go forward with surgery. The patient has a nasal obstruction secondary to deviated septum and enlarged turbinates. I recommend septoplasty and turbinate reduction. Risks, benefits and alternatives discussed with the patient. Risk include, but not limited to bleeding, infection, pain, septal perforation, septal hematoma, poor cosmetic outcome. The patient understands risk and would like to proceed. 2. Nasal turbinate hypertrophy      3. Nasal obstruction        Return for 1 week post op. Portions of this note were dictated using Dragon.  There may be linguistic errors secondary to the use of this program.

## 2022-02-17 ENCOUNTER — TELEPHONE (OUTPATIENT)
Dept: ENT CLINIC | Age: 27
End: 2022-02-17

## 2022-03-04 NOTE — PROGRESS NOTES
Place patient label inside box (if no patient label, complete below)  Name:  :  MR#:   Christiano Quezada / PROCEDURE  1. I (we), Jacque Rankin (Patient Name) authorize Kristan Wagner (Provider / Joseluis Hernandez) and/or such assistants as may be selected by him/her, to perform the following operation/procedure(s): BILATERAL INFERIOR TURBINATE REDUCTION AND SEPTOPLASTY        Note: If unable to obtain consent prior to an emergent procedure, document the emergent reason in the medical record. This procedure has been explained to my (our) satisfaction and included in the explanation was:  A) The intended benefit, nature, and extent of the procedure to be performed;  B) The significant risks involved and the probability of success;  C) Alternative procedures and methods of treatment;  D) The dangers and probable consequences of such alternatives (including no procedure or treatment); E) The expected consequences of the procedure on my future health;  F) Whether other qualified individuals would be performing important surgical tasks and/or whether  would be present to advise or support the procedure. I (we) understand that there are other risks of infection and other serious complications in the pre-operative/procedural and postoperative/procedural stages of my (our) care. I (we) have asked all of the questions which I (we) thought were important in deciding whether or not to undergo treatment or diagnosis. These questions have been answered to my (our) satisfaction. I (we) understand that no assurance can be given that the procedure will be a success, and no guarantee or warranty of success has been given to me (us). 2. It has been explained to me (us) that during the course of the operation/procedure, unforeseen conditions may be revealed that necessitate extension of the original procedure(s) or different procedure(s) than those set forth in Paragraph 1. I (we) authorize and request that the above-named physician, his/her assistants or his/her designees, perform procedures as necessary and desirable if deemed to be in my (our) best interest.     Revised 8/2/2021                                                                          Page 1 of 2           3. I acknowledge that health care personnel may be observing this procedure for the purpose of medical education or other specified purposes as may be necessary as requested and/or approved by my (our) physician. 4. I (we) consent to the disposal by the hospital Pathologist of the removed tissue, parts or organs in accordance with hospital policy. 5. I do ____ do not ____ consent to the use of a local infiltration pain blocking agent that will be used by my provider/surgical provider to help alleviate pain during my procedure. 6. I do ____ do not ____ consent to an emergent blood transfusion in the case of a life-threatening situation that requires blood components to be administered. This consent is valid for 24 hours from the beginning of the procedure. 7. This patient does ____ or does not ____ currently have a DNR status/order. If DNR order is in place, obtain Addendum to the Surgical Consent for ALL Patients with a DNR Order to address bhavin-operative status for limited intervention or DNR suspension.      8. I have read and fully understand the above Consent for Operation/Procedure and that all blanks were completed before I signed the consent.   _____________________________       _____________________      ____/____am/pm  Signature of Patient or legal representative      Printed Name / Relationship            Date / Time   ____________________________       _____________________      ____/____am/pm  Witness to Signature                                    Printed Name                    Date / Time     If patient is unable to sign or is a minor, complete the following)  Patient is a minor, ____ years of age, or unable to sign because:   ______________________________________________________________________________________________    Castaneda If a phone consent is obtained, consent will be documented by using two health care professionals, each affirming that the consenting party has no questions and gives consent for the procedure discussed with the physician/provider.   _____________________          ____________________       _____/_____am/pm   2nd witness to phone consent        Printed name           Date / Time    Informed Consent:  I have provided the explanation described above in section 1 to the patient and/or legal representative.  I have provided the patient and/or legal representative with an opportunity to ask any questions about the proposed operation/procedure.   ___________________________          ____________________         ____/____am/pm  Provider / Proceduralist                            Printed name            Date / Time  Revised 8/2/2021                                                                      Page 2 of 2

## 2022-03-08 NOTE — PROGRESS NOTES
PRE-OP INSTRUCTIONS FOR THE SURGICAL PATIENT YOU ARE UNABLE TO MAKE CONTACT FOR AN INTERVIEW:    All patients having surgery or anesthesia are required to be Covid tested OR to have been vaccinated at least 14 days prior to your procedure. It is very important to return our call to [phone] and notify the staff of your last vaccination date otherwise you will be required to complete Covid PCR test within the 5-6 days prior to surgery & quarantine. The results will need to be faxed to PreAdmission Testing at [phone]. 1. Follow all instructions provided to you from your surgeons office, including your ARRIVAL TIME. 2. Enter the MAIN entrance located on [address] and report to the desk. 3. Bring your insurance & photo ID with you. You may also be asked to pay a co-pay, as you may want to bring a check or credit card with you. 4. Leave all other valuables at home. 5. Arrange for someone to drive you home and be with you for the first 24 hours after discharge. 6. Bring your medication list with you day of surgery with doses and frequency listed (including over the counter medications)  7. You must contact your surgeon for Instructions regarding:              - ALL medication instructions, especially if taking blood thinners, aspirin, or diabetic medication.         -Bariatric patients call surgeon if on diabetic medications as some need to be stopped 1 week preop  - IF  there is a change in your physical condition such as a cold, fever, rash, cuts, sores or any other infection, especially near your surgical site. 8. A Pre-op History and Physical for surgery MUST be completed by your Physician or an Urgent Care within 30 days of your procedure date. Please bring a copy with you on the day of your procedure and along with any other testing performed. 9. DO NOT EAT ANYTHING eight hours prior to arrival for surgery.   May have up to 8 ounces of water 4 hours prior to arrival for surgery. NOTE: ALL Gastric, Bariatric and Bowel surgery patients MUST follow their surgeon's instructions. 10. No gum, candy, mints, or ice chips day of procedure. 11. Please refrain from drinking alcohol the day before or day of your procedure. 12. Please do not smoke the day of your procedure. 13. Dress in loose, comfortable clothing appropriate for redressing after your procedure. Do not wear jewelry (including body piercings), make-up, fingernail polish, lotion, powders or metal hairclips. 15. Contacts will need to be removed prior to surgery. You may want to bring your eye glasses to wear immediately before and after surgery. 14. Dentures will need to be removed before your procedure. 13. Bring cases for your glasses, contacts, dentures, or hearing aids to protect them while you are in surgery. 16. If you use a CPAP, please bring it with you on the day of your procedure. 17. Do not shave or wax for 72 hours prior to procedure near your operative site  18. FOR WOMAN OF CHILDBEARING AGE ONLY- please make sure we can collect a urine sample on arrival.     If you have further questions, you may contact your surgeon's office or us at [phone]     Left instructions on patient's voicemail.     Suresh Garza RN.3/8/2022 .10:56 AM

## 2022-03-10 ENCOUNTER — OFFICE VISIT (OUTPATIENT)
Dept: PSYCHIATRY | Age: 27
End: 2022-03-10
Payer: MEDICAID

## 2022-03-10 ENCOUNTER — TELEPHONE (OUTPATIENT)
Dept: INTERNAL MEDICINE CLINIC | Age: 27
End: 2022-03-10

## 2022-03-10 DIAGNOSIS — F84.0 AUTISM SPECTRUM DISORDER: ICD-10-CM

## 2022-03-10 DIAGNOSIS — F95.2 TOURETTE SYNDROME: ICD-10-CM

## 2022-03-10 DIAGNOSIS — F33.0 MILD EPISODE OF RECURRENT MAJOR DEPRESSIVE DISORDER (HCC): ICD-10-CM

## 2022-03-10 DIAGNOSIS — F41.0 GENERALIZED ANXIETY DISORDER WITH PANIC ATTACKS: Primary | ICD-10-CM

## 2022-03-10 DIAGNOSIS — F41.1 GENERALIZED ANXIETY DISORDER WITH PANIC ATTACKS: Primary | ICD-10-CM

## 2022-03-10 PROCEDURE — G8428 CUR MEDS NOT DOCUMENT: HCPCS | Performed by: STUDENT IN AN ORGANIZED HEALTH CARE EDUCATION/TRAINING PROGRAM

## 2022-03-10 PROCEDURE — 99214 OFFICE O/P EST MOD 30 MIN: CPT | Performed by: STUDENT IN AN ORGANIZED HEALTH CARE EDUCATION/TRAINING PROGRAM

## 2022-03-10 PROCEDURE — 1036F TOBACCO NON-USER: CPT | Performed by: STUDENT IN AN ORGANIZED HEALTH CARE EDUCATION/TRAINING PROGRAM

## 2022-03-10 PROCEDURE — G8482 FLU IMMUNIZE ORDER/ADMIN: HCPCS | Performed by: STUDENT IN AN ORGANIZED HEALTH CARE EDUCATION/TRAINING PROGRAM

## 2022-03-10 PROCEDURE — G8417 CALC BMI ABV UP PARAM F/U: HCPCS | Performed by: STUDENT IN AN ORGANIZED HEALTH CARE EDUCATION/TRAINING PROGRAM

## 2022-03-10 ASSESSMENT — PATIENT HEALTH QUESTIONNAIRE - PHQ9
SUM OF ALL RESPONSES TO PHQ QUESTIONS 1-9: 17
SUM OF ALL RESPONSES TO PHQ9 QUESTIONS 1 & 2: 6
SUM OF ALL RESPONSES TO PHQ QUESTIONS 1-9: 17
5. POOR APPETITE OR OVEREATING: 1
SUM OF ALL RESPONSES TO PHQ QUESTIONS 1-9: 17
SUM OF ALL RESPONSES TO PHQ QUESTIONS 1-9: 17
9. THOUGHTS THAT YOU WOULD BE BETTER OFF DEAD, OR OF HURTING YOURSELF: 0
8. MOVING OR SPEAKING SO SLOWLY THAT OTHER PEOPLE COULD HAVE NOTICED. OR THE OPPOSITE, BEING SO FIGETY OR RESTLESS THAT YOU HAVE BEEN MOVING AROUND A LOT MORE THAN USUAL: 0
6. FEELING BAD ABOUT YOURSELF - OR THAT YOU ARE A FAILURE OR HAVE LET YOURSELF OR YOUR FAMILY DOWN: 1
4. FEELING TIRED OR HAVING LITTLE ENERGY: 3
1. LITTLE INTEREST OR PLEASURE IN DOING THINGS: 3
2. FEELING DOWN, DEPRESSED OR HOPELESS: 3
3. TROUBLE FALLING OR STAYING ASLEEP: 3
7. TROUBLE CONCENTRATING ON THINGS, SUCH AS READING THE NEWSPAPER OR WATCHING TELEVISION: 3

## 2022-03-10 ASSESSMENT — ANXIETY QUESTIONNAIRES
3. WORRYING TOO MUCH ABOUT DIFFERENT THINGS: 3
7. FEELING AFRAID AS IF SOMETHING AWFUL MIGHT HAPPEN: 0
GAD7 TOTAL SCORE: 15
5. BEING SO RESTLESS THAT IT IS HARD TO SIT STILL: 1
2. NOT BEING ABLE TO STOP OR CONTROL WORRYING: 3
4. TROUBLE RELAXING: 3
1. FEELING NERVOUS, ANXIOUS, OR ON EDGE: 3
6. BECOMING EASILY ANNOYED OR IRRITABLE: 2

## 2022-03-10 ASSESSMENT — ENCOUNTER SYMPTOMS
RESPIRATORY NEGATIVE: 1
EYES NEGATIVE: 1
GASTROINTESTINAL NEGATIVE: 1
ALLERGIC/IMMUNOLOGIC NEGATIVE: 1

## 2022-03-10 NOTE — PROGRESS NOTES
PSYCHIATRY PROGRESS NOTE    Macho Forde  1995  03/10/22  Face to Face time: 30 minutes  PCP: Saida Miles MD    CC:   Chief Complaint   Patient presents with    Anxiety    Depression     Patient is a 52-year-old male with significant past medical history of chronic headache, chronic back pain, hypertension, type 2 diabetes who presents to the psychiatry clinic today for evaluation and management of depression and anxiety. A:  The patient's presentation today remains essentially unchanged from his previous visit. The patient identifies that this may be from his extensive physical issues/limitations in his remaining hopeful that this may improve significantly over the coming weeks with physical therapy and a surgical procedure. We will continue to evaluate the patient's progress from a psychiatric standpoint and adjust medications accordingly. Diagnosis:  Major depressive disorder, recurrent, mild  Generalized anxiety disorder with panic  Social anxiety disorder, severity unspecified  Tourette syndrome  Concern for autism spectrum disorder    P:   1. Given the upcoming surgeries, we will plan to maintain the patient on his current dose without change of Wellbutrin XL 300mg for treatment of depression and anxiety. At subsequent visits we may need to consider either increasing this medication or swapping to another medication entirely. 2.  We will plan to continue the patient on his current dose of Escitalopram 20 mg daily and aripiprazole 5 mg daily. Medication Monitoring:    - PDMP reviewed: No interval change     Follow-up: 4 weeks    Safety: Pt was counseled on the potential for increased suicidal ideations and advised on potential options for dealing with these including hotlines, calling the office, or going to the nearest emergency room.       __________________________________________________________________________    S:   Patient identified that he is struggling significantly at this time with fatigue issues and pain. He notes that the physical discomfort is causing him significant difficulties with sleep because he cannot lie down for prolonged periods of time without pain. Patient identified that he was experiencing discomfort even walking or sitting in the exam room. He was hopeful that the physical therapy that he is going be starting over the next couple of weeks would help to reduce his pain load down. That being said, currently he identifies significant mental fatigue as well as physical fatigue and does believe the 2 to be related. Patient acknowledged that he is going to be having surgery this upcoming Monday for repair of his deviated septum. Patient was hopeful that the surgery would allow him slightly better breathing, noting that this may actually help some of his physical fatigue as well. With the significant amounts of pain, the patient identified that his mood has remained generally low and that he has had continued difficulties with focus. He denied any SI/HI/AVH at the visit today. ROS:  Review of Systems   Constitutional: Positive for activity change and fatigue. HENT: Negative. Eyes: Negative. Respiratory: Negative. Cardiovascular: Negative. Gastrointestinal: Negative. Endocrine: Negative. Genitourinary: Negative. Musculoskeletal: Positive for back pain. Skin: Negative. Allergic/Immunologic: Negative. Hematological: Negative. Psychiatric/Behavioral: Positive for decreased concentration, dysphoric mood and sleep disturbance. The patient is nervous/anxious.          Brief Medical Hx:   Patient Active Problem List   Diagnosis    Allergic rhinitis    Generalized anxiety disorder with panic attacks    Chronic daily headache    Mid back pain, chronic    Essential hypertension    Mucous retention cyst of maxillary sinus    Diabetes mellitus type 2, insulin dependent (HCC)    Mild episode of recurrent major depressive disorder ([facility]    Tourette syndrome    Autism spectrum disorder    Rectal bleeding        Brief Psych Hx:  Hosp: Denied  Diagnoses: Bipolar 2 disorder, generalized anxiety disorder  Med trials: lamotrigine,    Outpt: Previously worked with NP Xi Nagy  NSSI: Denied  Suicide Attempts: Denied    O:  Wt Readings from Last 3 Encounters:   03/10/22 236 lb 3.2 oz (107.1 kg)   02/16/22 234 lb (106.1 kg)   02/15/22 234 lb (106.1 kg)       Vitals:    03/10/22 1630   BP: 126/82   Pulse: 60   Resp: 12   Weight: 236 lb 3.2 oz (107.1 kg)       Mental Status Exam:   Appearance:    Appropriately dressed  Motor: Difficulty sitting still secondary to pain  Eye Contact: Good  Speech:    Appropriate rate and rhythm  Language:   Appropriate diction  Mood/Affect: \"I am the same\"/blunted  Thought Process:   Linear, logical.  Notably concrete at times  Thought Content:    Topicappropriate, no SI/HI  Hallucinations:   Denied, not seem to be responding to internal stimuli  Associations:   Intact  Attention/Concentration:   Intact  Orientation:    Alert and oriented x4  Memory:   Intact  Fund of Knowledge:    Appropriate for age and education  Insight/Judgement:   Intact/intact        AFREED-7 SCREENING 3/10/2022 2/10/2022   Feeling nervous, anxious, or on edge Nearly every day Nearly every day   Not being able to stop or control worrying Nearly every day More than half the days   Worrying too much about different things Nearly every day More than half the days   Trouble relaxing Nearly every day Nearly every day   Being so restless that it is hard to sit still Several days Several days   Becoming easily annoyed or irritable More than half the days Nearly every day   Feeling afraid as if something awful might happen Not at all Several days   FAREED-7 Total Score 15 15   How difficult have these problems made it for you to do your work, take care of things at home, or get along with other people? - -     PHQ-9 Questionaire 3/10/2022 2/15/2022 Little interest or pleasure in doing things 3 2   Feeling down, depressed, or hopeless 3 2   Trouble falling or staying asleep, or sleeping too much 3 3   Feeling tired or having little energy 3 3   Poor appetite or overeating 1 1   Feeling bad about yourself - or that you are a failure or have let yourself or your family down 1 1   Trouble concentrating on things, such as reading the newspaper or watching television 3 2   Moving or speaking so slowly that other people could have noticed.  Or the opposite - being so fidgety or restless that you have been moving around a lot more than usual 0 0   Thoughts that you would be better off dead, or of hurting yourself in some way 0 0   PHQ-9 Total Score 17 14   If you checked off any problems, how difficult have these problems made it for you to do your work, take care of things at home, or get along with other people? - 0        Labs:     Hospital Outpatient Visit on 02/09/2022   Component Date Value Ref Range Status    Left Ventricular Ejection Fraction 02/09/2022 66   Final-Edited    LVEF MODALITY 02/09/2022 Nuclear   Final-Edited       EKG: 10/14/2021 QTc 400    Jose Lynn MD  Psychiatrist

## 2022-03-10 NOTE — TELEPHONE ENCOUNTER
Johana Sutherland with [facility] called to check the status. She is wanting to know if the doctor will be filing those out and scanning them back in to the patients chart or how are they going to get the forms completed by  his appointment on Monday?

## 2022-03-11 ENCOUNTER — ANESTHESIA EVENT (OUTPATIENT)
Dept: OPERATING ROOM | Age: 27
End: 2022-03-11
Payer: MEDICAID

## 2022-03-11 ENCOUNTER — TELEPHONE (OUTPATIENT)
Dept: ENT CLINIC | Age: 27
End: 2022-03-11

## 2022-03-11 DIAGNOSIS — R11.2 NON-INTRACTABLE VOMITING WITH NAUSEA, UNSPECIFIED VOMITING TYPE: ICD-10-CM

## 2022-03-11 DIAGNOSIS — R10.13 EPIGASTRIC ABDOMINAL PAIN: ICD-10-CM

## 2022-03-11 RX ORDER — OMEPRAZOLE 40 MG/1
CAPSULE, DELAYED RELEASE ORAL
Qty: 30 CAPSULE | Refills: 1 | Status: SHIPPED | OUTPATIENT
Start: 2022-03-11 | End: 2022-05-31

## 2022-03-14 ENCOUNTER — ANESTHESIA (OUTPATIENT)
Dept: OPERATING ROOM | Age: 27
End: 2022-03-14
Payer: MEDICAID

## 2022-03-14 ENCOUNTER — HOSPITAL ENCOUNTER (OUTPATIENT)
Age: 27
Setting detail: OUTPATIENT SURGERY
Discharge: HOME | End: 2022-03-14
Attending: OTOLARYNGOLOGY | Admitting: OTOLARYNGOLOGY
Payer: MEDICAID

## 2022-03-14 VITALS
TEMPERATURE: 98.1 F | HEART RATE: 72 BPM | HEIGHT: 70 IN | OXYGEN SATURATION: 96 % | SYSTOLIC BLOOD PRESSURE: 155 MMHG | WEIGHT: 231.4 LBS | DIASTOLIC BLOOD PRESSURE: 106 MMHG | BODY MASS INDEX: 33.13 KG/M2 | RESPIRATION RATE: 16 BRPM

## 2022-03-14 VITALS — OXYGEN SATURATION: 87 % | SYSTOLIC BLOOD PRESSURE: 97 MMHG | DIASTOLIC BLOOD PRESSURE: 52 MMHG | TEMPERATURE: 89.4 F

## 2022-03-14 VITALS
RESPIRATION RATE: 12 BRPM | HEART RATE: 60 BPM | DIASTOLIC BLOOD PRESSURE: 82 MMHG | SYSTOLIC BLOOD PRESSURE: 126 MMHG | WEIGHT: 236.2 LBS | BODY MASS INDEX: 33.89 KG/M2

## 2022-03-14 DIAGNOSIS — G89.18 ACUTE POST-OPERATIVE PAIN: Primary | ICD-10-CM

## 2022-03-14 PROBLEM — J34.2 DEVIATED NASAL SEPTUM: Status: ACTIVE | Noted: 2022-03-14

## 2022-03-14 PROBLEM — J34.3 HYPERTROPHY OF INFERIOR NASAL TURBINATE: Status: ACTIVE | Noted: 2022-03-14

## 2022-03-14 PROBLEM — J34.89 NASAL OBSTRUCTION: Status: ACTIVE | Noted: 2022-03-14

## 2022-03-14 LAB
GLUCOSE BLD-MCNC: 124 MG/DL (ref 70–99)
GLUCOSE BLD-MCNC: 150 MG/DL (ref 70–99)
PERFORMED ON: ABNORMAL
PERFORMED ON: ABNORMAL

## 2022-03-14 PROCEDURE — 6370000000 HC RX 637 (ALT 250 FOR IP): Performed by: ANESTHESIOLOGY

## 2022-03-14 PROCEDURE — 3700000000 HC ANESTHESIA ATTENDED CARE: Performed by: OTOLARYNGOLOGY

## 2022-03-14 PROCEDURE — 3700000001 HC ADD 15 MINUTES (ANESTHESIA): Performed by: OTOLARYNGOLOGY

## 2022-03-14 PROCEDURE — 2720000010 HC SURG SUPPLY STERILE: Performed by: OTOLARYNGOLOGY

## 2022-03-14 PROCEDURE — 7100000010 HC PHASE II RECOVERY - FIRST 15 MIN: Performed by: OTOLARYNGOLOGY

## 2022-03-14 PROCEDURE — 7100000001 HC PACU RECOVERY - ADDTL 15 MIN: Performed by: OTOLARYNGOLOGY

## 2022-03-14 PROCEDURE — 7100000011 HC PHASE II RECOVERY - ADDTL 15 MIN: Performed by: OTOLARYNGOLOGY

## 2022-03-14 PROCEDURE — 2500000003 HC RX 250 WO HCPCS: Performed by: OTOLARYNGOLOGY

## 2022-03-14 PROCEDURE — 2580000003 HC RX 258: Performed by: NURSE ANESTHETIST, CERTIFIED REGISTERED

## 2022-03-14 PROCEDURE — 2500000003 HC RX 250 WO HCPCS: Performed by: NURSE ANESTHETIST, CERTIFIED REGISTERED

## 2022-03-14 PROCEDURE — 3600000014 HC SURGERY LEVEL 4 ADDTL 15MIN: Performed by: OTOLARYNGOLOGY

## 2022-03-14 PROCEDURE — 2580000003 HC RX 258: Performed by: OTOLARYNGOLOGY

## 2022-03-14 PROCEDURE — 7100000000 HC PACU RECOVERY - FIRST 15 MIN: Performed by: OTOLARYNGOLOGY

## 2022-03-14 PROCEDURE — 30140 RESECT INFERIOR TURBINATE: CPT | Performed by: OTOLARYNGOLOGY

## 2022-03-14 PROCEDURE — 6370000000 HC RX 637 (ALT 250 FOR IP): Performed by: OTOLARYNGOLOGY

## 2022-03-14 PROCEDURE — A4217 STERILE WATER/SALINE, 500 ML: HCPCS | Performed by: OTOLARYNGOLOGY

## 2022-03-14 PROCEDURE — 6360000002 HC RX W HCPCS: Performed by: NURSE ANESTHETIST, CERTIFIED REGISTERED

## 2022-03-14 PROCEDURE — 6360000002 HC RX W HCPCS: Performed by: ANESTHESIOLOGY

## 2022-03-14 PROCEDURE — 2500000003 HC RX 250 WO HCPCS: Performed by: ANESTHESIOLOGY

## 2022-03-14 PROCEDURE — 30520 REPAIR OF NASAL SEPTUM: CPT | Performed by: OTOLARYNGOLOGY

## 2022-03-14 PROCEDURE — 2709999900 HC NON-CHARGEABLE SUPPLY: Performed by: OTOLARYNGOLOGY

## 2022-03-14 PROCEDURE — 3600000004 HC SURGERY LEVEL 4 BASE: Performed by: OTOLARYNGOLOGY

## 2022-03-14 PROCEDURE — 2580000003 HC RX 258: Performed by: ANESTHESIOLOGY

## 2022-03-14 RX ORDER — METOCLOPRAMIDE HYDROCHLORIDE 5 MG/ML
10 INJECTION INTRAMUSCULAR; INTRAVENOUS
Status: DISCONTINUED | OUTPATIENT
Start: 2022-03-14 | End: 2022-03-14 | Stop reason: HOSPADM

## 2022-03-14 RX ORDER — MAGNESIUM HYDROXIDE 1200 MG/15ML
LIQUID ORAL CONTINUOUS PRN
Status: COMPLETED | OUTPATIENT
Start: 2022-03-14 | End: 2022-03-14

## 2022-03-14 RX ORDER — SODIUM CHLORIDE 9 MG/ML
25 INJECTION, SOLUTION INTRAVENOUS PRN
Status: DISCONTINUED | OUTPATIENT
Start: 2022-03-14 | End: 2022-03-14 | Stop reason: HOSPADM

## 2022-03-14 RX ORDER — LORAZEPAM 2 MG/ML
0.5 INJECTION INTRAMUSCULAR
Status: COMPLETED | OUTPATIENT
Start: 2022-03-14 | End: 2022-03-14

## 2022-03-14 RX ORDER — SODIUM CHLORIDE, SODIUM LACTATE, POTASSIUM CHLORIDE, CALCIUM CHLORIDE 600; 310; 30; 20 MG/100ML; MG/100ML; MG/100ML; MG/100ML
INJECTION, SOLUTION INTRAVENOUS CONTINUOUS PRN
Status: DISCONTINUED | OUTPATIENT
Start: 2022-03-14 | End: 2022-03-14 | Stop reason: SDUPTHER

## 2022-03-14 RX ORDER — IPRATROPIUM BROMIDE AND ALBUTEROL SULFATE 2.5; .5 MG/3ML; MG/3ML
1 SOLUTION RESPIRATORY (INHALATION)
Status: DISCONTINUED | OUTPATIENT
Start: 2022-03-14 | End: 2022-03-14 | Stop reason: HOSPADM

## 2022-03-14 RX ORDER — ONDANSETRON 2 MG/ML
4 INJECTION INTRAMUSCULAR; INTRAVENOUS
Status: DISCONTINUED | OUTPATIENT
Start: 2022-03-14 | End: 2022-03-14 | Stop reason: HOSPADM

## 2022-03-14 RX ORDER — LISINOPRIL 10 MG/1
10 TABLET ORAL DAILY
Status: DISCONTINUED | OUTPATIENT
Start: 2022-03-14 | End: 2022-03-14 | Stop reason: HOSPADM

## 2022-03-14 RX ORDER — CETIRIZINE HYDROCHLORIDE 5 MG/1
5 TABLET ORAL DAILY
COMMUNITY

## 2022-03-14 RX ORDER — LABETALOL HYDROCHLORIDE 5 MG/ML
10 INJECTION, SOLUTION INTRAVENOUS
Status: COMPLETED | OUTPATIENT
Start: 2022-03-14 | End: 2022-03-14

## 2022-03-14 RX ORDER — OXYMETAZOLINE HYDROCHLORIDE 0.05 G/100ML
SPRAY NASAL PRN
Status: DISCONTINUED | OUTPATIENT
Start: 2022-03-14 | End: 2022-03-14 | Stop reason: ALTCHOICE

## 2022-03-14 RX ORDER — FENTANYL CITRATE 50 UG/ML
50 INJECTION, SOLUTION INTRAMUSCULAR; INTRAVENOUS EVERY 5 MIN PRN
Status: DISCONTINUED | OUTPATIENT
Start: 2022-03-14 | End: 2022-03-14 | Stop reason: HOSPADM

## 2022-03-14 RX ORDER — MIDAZOLAM HYDROCHLORIDE 1 MG/ML
INJECTION INTRAMUSCULAR; INTRAVENOUS PRN
Status: DISCONTINUED | OUTPATIENT
Start: 2022-03-14 | End: 2022-03-14 | Stop reason: SDUPTHER

## 2022-03-14 RX ORDER — SODIUM CHLORIDE 0.9 % (FLUSH) 0.9 %
5-40 SYRINGE (ML) INJECTION PRN
Status: DISCONTINUED | OUTPATIENT
Start: 2022-03-14 | End: 2022-03-14 | Stop reason: HOSPADM

## 2022-03-14 RX ORDER — PROPOFOL 10 MG/ML
INJECTION, EMULSION INTRAVENOUS PRN
Status: DISCONTINUED | OUTPATIENT
Start: 2022-03-14 | End: 2022-03-14 | Stop reason: SDUPTHER

## 2022-03-14 RX ORDER — ROCURONIUM BROMIDE 10 MG/ML
INJECTION, SOLUTION INTRAVENOUS PRN
Status: DISCONTINUED | OUTPATIENT
Start: 2022-03-14 | End: 2022-03-14 | Stop reason: SDUPTHER

## 2022-03-14 RX ORDER — SODIUM CHLORIDE 0.9 % (FLUSH) 0.9 %
5-40 SYRINGE (ML) INJECTION EVERY 12 HOURS SCHEDULED
Status: DISCONTINUED | OUTPATIENT
Start: 2022-03-14 | End: 2022-03-14 | Stop reason: HOSPADM

## 2022-03-14 RX ORDER — LIDOCAINE HYDROCHLORIDE AND EPINEPHRINE 10; 10 MG/ML; UG/ML
INJECTION, SOLUTION INFILTRATION; PERINEURAL PRN
Status: DISCONTINUED | OUTPATIENT
Start: 2022-03-14 | End: 2022-03-14 | Stop reason: ALTCHOICE

## 2022-03-14 RX ORDER — ATENOLOL 25 MG/1
25 TABLET ORAL ONCE
Status: COMPLETED | OUTPATIENT
Start: 2022-03-14 | End: 2022-03-14

## 2022-03-14 RX ORDER — LIDOCAINE HYDROCHLORIDE 10 MG/ML
1 INJECTION, SOLUTION EPIDURAL; INFILTRATION; INTRACAUDAL; PERINEURAL
Status: DISCONTINUED | OUTPATIENT
Start: 2022-03-14 | End: 2022-03-14 | Stop reason: HOSPADM

## 2022-03-14 RX ORDER — LIDOCAINE HYDROCHLORIDE 20 MG/ML
INJECTION, SOLUTION INFILTRATION; PERINEURAL PRN
Status: DISCONTINUED | OUTPATIENT
Start: 2022-03-14 | End: 2022-03-14 | Stop reason: SDUPTHER

## 2022-03-14 RX ORDER — FENTANYL CITRATE 50 UG/ML
INJECTION, SOLUTION INTRAMUSCULAR; INTRAVENOUS PRN
Status: DISCONTINUED | OUTPATIENT
Start: 2022-03-14 | End: 2022-03-14 | Stop reason: SDUPTHER

## 2022-03-14 RX ORDER — OXYCODONE HYDROCHLORIDE 5 MG/1
5 TABLET ORAL PRN
Status: COMPLETED | OUTPATIENT
Start: 2022-03-14 | End: 2022-03-14

## 2022-03-14 RX ORDER — AMOXICILLIN 500 MG/1
500 CAPSULE ORAL 2 TIMES DAILY
Qty: 14 CAPSULE | Refills: 0 | Status: SHIPPED | OUTPATIENT
Start: 2022-03-14 | End: 2022-03-21

## 2022-03-14 RX ORDER — SODIUM CHLORIDE, SODIUM LACTATE, POTASSIUM CHLORIDE, CALCIUM CHLORIDE 600; 310; 30; 20 MG/100ML; MG/100ML; MG/100ML; MG/100ML
INJECTION, SOLUTION INTRAVENOUS CONTINUOUS
Status: DISCONTINUED | OUTPATIENT
Start: 2022-03-14 | End: 2022-03-14 | Stop reason: HOSPADM

## 2022-03-14 RX ORDER — HYDROCODONE BITARTRATE AND ACETAMINOPHEN 5; 325 MG/1; MG/1
1-2 TABLET ORAL EVERY 6 HOURS PRN
Qty: 30 TABLET | Refills: 0 | Status: SHIPPED | OUTPATIENT
Start: 2022-03-14 | End: 2022-03-21

## 2022-03-14 RX ORDER — OXYCODONE HYDROCHLORIDE 5 MG/1
10 TABLET ORAL PRN
Status: COMPLETED | OUTPATIENT
Start: 2022-03-14 | End: 2022-03-14

## 2022-03-14 RX ADMIN — SODIUM CHLORIDE, SODIUM LACTATE, POTASSIUM CHLORIDE, AND CALCIUM CHLORIDE: .6; .31; .03; .02 INJECTION, SOLUTION INTRAVENOUS at 10:11

## 2022-03-14 RX ADMIN — OXYCODONE 5 MG: 5 TABLET ORAL at 13:33

## 2022-03-14 RX ADMIN — FENTANYL CITRATE 100 MCG: 50 INJECTION, SOLUTION INTRAMUSCULAR; INTRAVENOUS at 10:11

## 2022-03-14 RX ADMIN — MIDAZOLAM HYDROCHLORIDE 2 MG: 2 INJECTION, SOLUTION INTRAMUSCULAR; INTRAVENOUS at 10:11

## 2022-03-14 RX ADMIN — PROPOFOL 50 MG: 10 INJECTION, EMULSION INTRAVENOUS at 10:27

## 2022-03-14 RX ADMIN — SODIUM CHLORIDE, POTASSIUM CHLORIDE, SODIUM LACTATE AND CALCIUM CHLORIDE: 600; 310; 30; 20 INJECTION, SOLUTION INTRAVENOUS at 09:25

## 2022-03-14 RX ADMIN — LABETALOL HYDROCHLORIDE 10 MG: 5 INJECTION, SOLUTION INTRAVENOUS at 12:00

## 2022-03-14 RX ADMIN — SODIUM CHLORIDE, SODIUM LACTATE, POTASSIUM CHLORIDE, AND CALCIUM CHLORIDE: .6; .31; .03; .02 INJECTION, SOLUTION INTRAVENOUS at 09:23

## 2022-03-14 RX ADMIN — SODIUM CHLORIDE, SODIUM LACTATE, POTASSIUM CHLORIDE, AND CALCIUM CHLORIDE: .6; .31; .03; .02 INJECTION, SOLUTION INTRAVENOUS at 10:55

## 2022-03-14 RX ADMIN — LIDOCAINE HYDROCHLORIDE 100 MG: 20 INJECTION, SOLUTION INFILTRATION; PERINEURAL at 10:14

## 2022-03-14 RX ADMIN — PROPOFOL 150 MG: 10 INJECTION, EMULSION INTRAVENOUS at 10:16

## 2022-03-14 RX ADMIN — SUGAMMADEX 200 MG: 100 INJECTION, SOLUTION INTRAVENOUS at 10:55

## 2022-03-14 RX ADMIN — LORAZEPAM 0.5 MG: 2 INJECTION INTRAMUSCULAR; INTRAVENOUS at 12:00

## 2022-03-14 RX ADMIN — ROCURONIUM BROMIDE 80 MG: 10 INJECTION INTRAVENOUS at 10:16

## 2022-03-14 RX ADMIN — ATENOLOL 25 MG: 25 TABLET ORAL at 09:16

## 2022-03-14 RX ADMIN — LISINOPRIL 10 MG: 10 TABLET ORAL at 12:50

## 2022-03-14 RX ADMIN — LABETALOL HYDROCHLORIDE 10 MG: 5 INJECTION, SOLUTION INTRAVENOUS at 12:15

## 2022-03-14 ASSESSMENT — PULMONARY FUNCTION TESTS
PIF_VALUE: 20
PIF_VALUE: 17
PIF_VALUE: 12
PIF_VALUE: 17
PIF_VALUE: 20
PIF_VALUE: 17
PIF_VALUE: 16
PIF_VALUE: 19
PIF_VALUE: 20
PIF_VALUE: 4
PIF_VALUE: 4
PIF_VALUE: 23
PIF_VALUE: 20
PIF_VALUE: 21
PIF_VALUE: 1
PIF_VALUE: 18
PIF_VALUE: 17
PIF_VALUE: 20
PIF_VALUE: 16
PIF_VALUE: 16
PIF_VALUE: 20
PIF_VALUE: 17
PIF_VALUE: 24
PIF_VALUE: 17
PIF_VALUE: 3
PIF_VALUE: 0
PIF_VALUE: 19
PIF_VALUE: 19
PIF_VALUE: 17
PIF_VALUE: 28
PIF_VALUE: 18
PIF_VALUE: 3
PIF_VALUE: 16
PIF_VALUE: 1
PIF_VALUE: 16
PIF_VALUE: 24
PIF_VALUE: 0
PIF_VALUE: 16
PIF_VALUE: 1
PIF_VALUE: 19
PIF_VALUE: 24
PIF_VALUE: 18
PIF_VALUE: 4
PIF_VALUE: 1
PIF_VALUE: 23
PIF_VALUE: 21
PIF_VALUE: 16
PIF_VALUE: 32
PIF_VALUE: 37
PIF_VALUE: 22
PIF_VALUE: 19
PIF_VALUE: 16
PIF_VALUE: 21
PIF_VALUE: 16

## 2022-03-14 ASSESSMENT — PAIN SCALES - GENERAL
PAINLEVEL_OUTOF10: 0
PAINLEVEL_OUTOF10: 6
PAINLEVEL_OUTOF10: 0
PAINLEVEL_OUTOF10: 0
PAINLEVEL_OUTOF10: 5
PAINLEVEL_OUTOF10: 0

## 2022-03-14 ASSESSMENT — PAIN DESCRIPTION - DURATION: DURATION_2: CONTINUOUS

## 2022-03-14 ASSESSMENT — PAIN DESCRIPTION - PAIN TYPE
TYPE: ACUTE PAIN;SURGICAL PAIN
TYPE_2: ACUTE PAIN;SURGICAL

## 2022-03-14 ASSESSMENT — PAIN DESCRIPTION - ORIENTATION
ORIENTATION_2: UPPER
ORIENTATION: INNER

## 2022-03-14 ASSESSMENT — PAIN DESCRIPTION - PROGRESSION
CLINICAL_PROGRESSION: GRADUALLY IMPROVING
CLINICAL_PROGRESSION_2: GRADUALLY WORSENING
CLINICAL_PROGRESSION: GRADUALLY WORSENING

## 2022-03-14 ASSESSMENT — LIFESTYLE VARIABLES: SMOKING_STATUS: 0

## 2022-03-14 ASSESSMENT — PAIN DESCRIPTION - DESCRIPTORS
DESCRIPTORS: BURNING
DESCRIPTORS_2: ACHING

## 2022-03-14 ASSESSMENT — PAIN DESCRIPTION - INTENSITY: RATING_2: 3

## 2022-03-14 ASSESSMENT — ENCOUNTER SYMPTOMS: BACK PAIN: 1

## 2022-03-14 ASSESSMENT — PAIN DESCRIPTION - LOCATION
LOCATION: NOSE
LOCATION_2: TEETH

## 2022-03-14 ASSESSMENT — PAIN DESCRIPTION - ONSET
ONSET_2: GRADUAL
ONSET: GRADUAL

## 2022-03-14 ASSESSMENT — PAIN - FUNCTIONAL ASSESSMENT: PAIN_FUNCTIONAL_ASSESSMENT: 0-10

## 2022-03-14 ASSESSMENT — PAIN DESCRIPTION - FREQUENCY: FREQUENCY: CONTINUOUS

## 2022-03-14 NOTE — PROGRESS NOTES
Ambulatory Surgery/Procedure Discharge Note    Vitals:    03/14/22 1320   BP: (!) 155/106   Pulse: 72   Resp: 16   Temp:    SpO2: 96%   temp:                 98.1      In: 1387 [P.O.:462; I.V.:925]  Out: - Pt drank water, ate nathalie crackers, and voided X 1 in toilet    Restroom use offered before discharge. Yes    Pain assessment:  present - adequately treated and location, inner nose and upper teeth; was given oxycodone 5 mg here in SDS for pain of 6/10  Pain Level: 5    Postop BP of 155/106 within 20% of preop BP. Pt had been given his atenolol 25 mg PO in preop, his lisinopril 10 mg in PACU, and some IV labetalol in PACU prior to arrival.  Pt states he has multiple medications to take when he gets home today. Pt returned to [facility] from PACU s/p nasal surgery. Pt fully alert; speech fairly clear, but has an preexisting speech impediment; breathing easily on RA; has mustache dressing beneath nose which had some sanguinous drainage, and this RN changed twice while pt in SDS. Has inner nose pain and upper teeth pain, and was given oxycodone 5 mg here in SDS after eating crackers and drinking water. Pt states pain went from 6 to 5. Denies nausea. Discharge instructions discussed with pt and pt's aunt Favian Fletcher, and both verbalized understanding. Rx for Norco and Amoxicillin included with d/c instructions, to be filled at outside pharmacy. This RN wrote on front of folder what time pt's BP meds given, along with time of oxycodone. IV removed, and dressing applied. Pt dressed himself. Patient discharged to home/self care.  Patient discharged via wheel chair by this RN to waiting family/S.O.       3/14/2022 2:17 PM

## 2022-03-14 NOTE — PROGRESS NOTES
Spoke with Dr Keagan Christianson about blood pressure issue and the orders for the Labetalol   Dr Keagan Christianson stated to give the medication   Gave Ativan also for the blood pressure     Patient moving about on the stretcher   Stated not cold but shaking  Gave additional dose of labetalol for blood pressure    No complaint of pain  Wants to go home  Spoke with Dr Keagan Christianson   Ordering Lisinopril 10 mg PO to take

## 2022-03-14 NOTE — BRIEF OP NOTE
Brief Postoperative Note      Patient: Guzman Hill  YOB: 1995  MRN: [id number]    Date of Procedure: 3/14/2022    Pre-Op Diagnosis: Nasal obstruction [J34.89] Deviated nasal septum [J34.2] Hypertrophy of inferior nasal turbinate [J34.3]    Post-Op Diagnosis: Same       Procedure(s):  BILATERAL INFERIOR TURBINATE REDUCTION AND SEPTOPLASTY    Surgeon(s):  Vladimir Ordoñez DO    Assistant:  * No surgical staff found *    Anesthesia: General    Estimated Blood Loss (mL): <99KQ    Complications: None    Specimens:   * No specimens in log *    Implants:  * No implants in log *      Drains: * No LDAs found *    Findings: see op note    Electronically signed by Vladimir Ordoñez DO on 3/14/2022 at 11:08 AM

## 2022-03-14 NOTE — OP NOTE
Patient Name: Mao Turpin  YOB: 1995  Medical Record Number:  [id number]  Billing Number:  [id number]  Date of Procedure: 3/14/2022  Time: 1021    Pre Operative Diagnoses:  1. Deviated nasal septum 2. Bilateral nasal turbinate hypertrophy. Post Operative Diagnoses:  same. Procedure:    1. Nasal septal reconstruction  2. Bilateral turbinate reduction           Surgeon: Gema Reeder DO  OR Staff: Circulator: Morris Welch RN  Scrub Person First: Sydney Gomes RN  Scrub Person Second: Kenton Levin RN  Circulator Assist: Malorie Matt  Anesthesia:  General anesthesia. Specimens: * No specimens in log *  Estimated Blood Loss: <25TV  Complications:  None. Findings:    Septal deviation to the left. Bilateral turbinate hypertrophy. Indications:  Uday Gregory is a now 33 y.o. male with a history of nasal obstruction, deviated nasal septum, and turbinate hypertrophy. Recommended surgical intervention. Risks and benefits discussed. Description:   After verification of informed consent, the patient was taken to the operating suite, transferred to the operating table, sedated with general anesthesia and endotracheally intubated. The head of the bed is rotated 90°. The patient was prepped and draped in a standard fashion. The nose was injected with 1% lidocaine 1 100,000 epinephrine. Using a 15 blade a left-sided hemitransfixion incision was made in the septal mucosa. Dissection was carried down to the level of the mucoperichondrial plane. A Morris elevator was used to elevate a mucoperichondrial flap. Once the flap was elevated at 15 blade was used to make a transverse cartilaginous incision. The contralateral flap is elevated in similar fashion. A Ryan swivel knife was used to resect a portion of the quadrangular cartilage leaving sufficient superior and anterior cartilage for tip and dorsal support.   Once this was complete an angled scissors using make a cut and the perpendicular plate of ethmoid. Blevins Dago forceps were then used to piecemeal removed perpendicular plate of ethmoid and vomer. Jackelyn Mocha was used to elevate the flaps off the maxillary crest.  An osteotome was used to remove the deviated portion of the maxillary crest.  The flaps and placed back down. The incision was closed with 4-0 chromic simple interrupted suture. Attention was then turned to the inferior turbinates. The inferior turbinates were injected with 1% lidocaine with 1 100,000 epinephrine. Under endoscopic visualization with a 0 degree 4mm rigid nasal endoscope. An 11 blade is used to make a stab incision at the head of the inferior turbinates bilaterally. A McCracken elevator was used to create a submucosal pocket. The 2.0 Xomed microdebrider turbinate wand was used to resect submucosal turbinate tissue. A long nasal speculum was then used to outfracture the inferior turbinates bilaterally. Examination of the nasal passages reveal significant improvement in the obstruction. Bactroban coated Multani splints were then placed in the nares bilaterally and sutured in place with a 3-0 Prolene suture. This concluded our portion of the procedure. The care the patient was turned back over to the anesthesia team.  The head of bed is rotated and degrees. The patient was taken off anesthesia extubated and transferred to PACU in stable condition. There were no complications with the procedure. The patient tolerated the procedure well. Disposition/Plan:  Stable to PACU.

## 2022-03-14 NOTE — PROGRESS NOTES
PACU Transfer to SDS # 6    BILATERAL INFERIOR TURBINATE REDUCTION AND SEPTOPLASTY - Bilateral  General    Dr Marte Pollen    Pt's Current Allergies: No known allergies    Pt meets criteria to transfer to next phase of care per NEVA SCORE and ASPAN standards    Recent Labs     03/14/22  0926 03/14/22  1109   POCGLU 150* 124*       Vitals:    03/14/22 1300   BP: (!) 168/98   Pulse: 81   Resp: 12   Temp: 97.5 °F (36.4 °C)   SpO2: 94%      BP within 20% of pt's admitting BP as per Neva Score  Received blood pressure medications   In PACU     Intake/Output Summary (Last 24 hours) at 3/14/2022 1303  Last data filed at 3/14/2022 1245  Gross per 24 hour   Intake 1147 ml   Output    Net 1147 ml     Drank diet soda  Ate crackers    Pain assessment:  none  Pain Level: 0    Patient was assessed for unknown alterations to skin integrity. There were not unknown alterations observed. Patient transferred to care of Josias Ordaz RN.  Via handoff sheet  Family updated and directed to Josias Ordaz  Via waiting room staff    3/14/2022 1:03 PM

## 2022-03-14 NOTE — ANESTHESIA POSTPROCEDURE EVALUATION
Department of Anesthesiology  Postprocedure Note    Patient: Afsaneh Edouard  MRN: [id number]  YOB: 1995  Date of evaluation: 3/14/2022  Time:  2:04 PM     Procedure Summary     Date: 03/14/22 Room / Location: [address] [place] [address] [address] [place] [address] / [facility]    Anesthesia Start: 1011 Anesthesia Stop: 1105    Procedure: BILATERAL INFERIOR TURBINATE REDUCTION AND SEPTOPLASTY (Bilateral ) Diagnosis:       Nasal obstruction      Deviated nasal septum      Hypertrophy of inferior nasal turbinate      (Nasal obstruction [J34.89] Deviated nasal septum [J34.2] Hypertrophy of inferior nasal turbinate [J34.3])    Surgeons: Wilian Sanchez DO Responsible Provider: Guillermo Vidal MD    Anesthesia Type: general ASA Status: 2          Anesthesia Type: general    Amos Phase I: Amos Score: 10    Amos Phase II: Amos Score: 10    Last vitals: Reviewed and per EMR flowsheets.        Anesthesia Post Evaluation    Patient location during evaluation: PACU  Level of consciousness: awake and alert  Airway patency: patent  Nausea & Vomiting: no vomiting  Complications: no  Cardiovascular status: blood pressure returned to baseline  Respiratory status: acceptable  Hydration status: euvolemic

## 2022-03-14 NOTE — H&P
[facility]    [phone]    [facility] Same Day Surgery Update H & P  Department of General Surgery   Surgical Service   Pre-operative History and Physical  Last H & P within the last 30 days. DIAGNOSIS:   Nasal obstruction [J34.89]  Deviated nasal septum [J34.2]  Hypertrophy of inferior nasal turbinate [J34.3]    Procedure(s):  BILATERAL INFERIOR TURBINATE REDUCTION AND SEPTOPLASTY    History obtained from: Patient interview and EHR      HISTORY OF PRESENT ILLNESS:   The patient is a 33 y.o. male with c/o nasal obstruction in the setting of deviated septum and nasal turbinate hypertrophy. Their symptoms have been unrelieved with conservative therapy and they presents today for the above procedure. Covid 19:  Patient denies fever, chills, worsening cough, or known exposure to Covid-19. Past Medical History:        Diagnosis Date    Anxiety     Depression     Hypertension      Past Surgical History:    History reviewed. No pertinent surgical history. Past Social History:  Social History     Socioeconomic History    Marital status: Single     Spouse name: None    Number of children: 0    Years of education: 25    Highest education level: Bachelor's degree (e.g., BA, AB, BS)   Occupational History    None   Tobacco Use    Smoking status: Never Smoker    Smokeless tobacco: Never Used   Substance and Sexual Activity    Alcohol use: Yes     Comment: Occasional, 2-3 x/wk, 1 mixed drink    Drug use: Never    Sexual activity: Not Currently     Partners: Female   Other Topics Concern    None   Social History Narrative    Finishing grad school    Moved to [place] in 2020. Patient currently living by himself with his 2 cats, supporting himself off of savings and the inheritance left by his father. He is currently attempting to reengage and finish his graduate education in [employer] and working on his thesis. During school years, the patient did identify being placed in \"speech impediment classes\" which he believes were actually special education courses. No guns in the home, no [employer], no legal issues at this time     Social Determinants of Health     Financial Resource Strain:     Difficulty of Paying Living Expenses: Not on file   Food Insecurity:     Worried About Running Out of Food in the Last Year: Not on file    Machelle of Food in the Last Year: Not on file   Transportation Needs:     Lack of Transportation (Medical): Not on file    Lack of Transportation (Non-Medical): Not on file   Physical Activity:     Days of Exercise per Week: Not on file    Minutes of Exercise per Session: Not on file   Stress:     Feeling of Stress : Not on file   Social Connections:     Frequency of Communication with Friends and Family: Not on file    Frequency of Social Gatherings with Friends and Family: Not on file    Attends Church Services: Not on file    Active Member of [address] [place] [address] or Organizations: Not on file    Attends Club or Organization Meetings: Not on file    Marital Status: Not on file   Intimate Partner Violence:     Fear of Current or Ex-Partner: Not on file    Emotionally Abused: Not on file    Physically Abused: Not on file    Sexually Abused: Not on file   Housing Stability:     Unable to Pay for Housing in the Last Year: Not on file    Number of [place] in the Last Year: Not on file    Unstable Housing in the Last Year: Not on file         Medications Prior to Admission:      Prior to Admission medications    Medication Sig Start Date End Date Taking?  Authorizing Provider   cetirizine (ZYRTEC) 5 MG tablet Take 5 mg by mouth daily   Yes Historical Provider, MD   omeprazole (PRILOSEC) 40 MG delayed release capsule TAKE ONE CAPSULE BY MOUTH EVERY MORNING BEFORE BREAKFAST 3/11/22  Yes Annie Flores MD   buPROPion (WELLBUTRIN XL) 300 MG extended release tablet TAKE ONE TABLET BY MOUTH EVERY MORNING 2/10/22  Yes Micheal Kenyon MD   ondansetron allergies    PHYSICAL EXAM:      BP (!) 147/92   Pulse 80   Temp 98.4 °F (36.9 °C) (Temporal)   Resp 15   Ht 5' 10\" (1.778 m)   Wt 231 lb 6.4 oz (105 kg)   SpO2 95%   BMI 33.20 kg/m²      Airway:  Airway patent with no audible stridor    Heart:  Regular rate and rhythm, No murmur noted    Lungs:  No increased work of breathing, good air exchange, clear to auscultation bilaterally, no crackles or wheezing    Abdomen:  Soft, non-distended, non-tender, no rebound tenderness or guarding, and no masses palpated    ASSESSMENT AND PLAN     Patient is a 33 y.o. male with above specified procedure planned. 1.  The patients history and physical was obtained and signed off by the pre-admission testing department. Patient seen and focused exam done today- no new changes since last physical exam on 2/15/22    2. Access to ancillary services are available per request of the provider.     FRAN Kim - DWAYNE     3/14/2022

## 2022-03-14 NOTE — PROGRESS NOTES
BILATERAL INFERIOR TURBINATE REDUCTION AND SEPTOPLASTY - Bilateral  General    D Libby    Current Allergies: No known allergies    Recent Labs     03/14/22  0926   POCGLU 150*       Admitted to PACU bed 2 from OR. Arrived on a stretcher . Attached to PACU monitoring system. Alarms and parameters set. Report received from anesthesia personnel. OR staff did not report skin issues that were observed while in OR  No problems reported intraoperatively. Pt arrived with oxygen per non-rebreather face mask with oxygen at 10 liters. Oral airway in place  Athrombic wraps in place. Removed to be discharged    Doctors aware of all labs before coming to recovery.

## 2022-03-14 NOTE — ANESTHESIA PRE PROCEDURE
Department of Anesthesiology  Preprocedure Note       Name:  Jorge Guerrero   Age:  33 y.o.  :  1995                                          MRN:  [id number]         Date:  3/14/2022      Surgeon: Sharath Gavin):  Olinda Calvo DO    Procedure: Procedure(s):  BILATERAL INFERIOR TURBINATE REDUCTION AND SEPTOPLASTY    Medications prior to admission:   Prior to Admission medications    Medication Sig Start Date End Date Taking?  Authorizing Provider   cetirizine (ZYRTEC) 5 MG tablet Take 5 mg by mouth daily   Yes Historical Provider, MD   omeprazole (PRILOSEC) 40 MG delayed release capsule TAKE ONE CAPSULE BY MOUTH EVERY MORNING BEFORE BREAKFAST 3/11/22  Yes Kalia Cohen MD   buPROPion (WELLBUTRIN XL) 300 MG extended release tablet TAKE ONE TABLET BY MOUTH EVERY MORNING 2/10/22  Yes Jennifer Russell MD   ondansetron (ZOFRAN) 4 MG tablet TAKE ONE TABLET BY MOUTH DAILY AS NEEDED FOR NAUSEA OR VOMITING 22  Yes Kalia Cohen MD   ARIPiprazole (ABILIFY) 5 MG tablet Take 1 tablet by mouth daily 22  Yes Jennifer Russell MD   insulin glargine (LANTUS SOLOSTAR) 100 UNIT/ML injection pen Inject 20 Units into the skin daily 1/3/22  Yes Kalia Cohen MD   escitalopram (LEXAPRO) 20 MG tablet TAKE ONE TABLET BY MOUTH DAILY 21  Yes Kalia Cohen MD   lisinopril (PRINIVIL;ZESTRIL) 10 MG tablet TAKE ONE TABLET BY MOUTH DAILY 12/3/21  Yes Kalia Cohen MD   atenolol (TENORMIN) 25 MG tablet TAKE ONE TABLET BY MOUTH DAILY 10/25/21  Yes Kalia Cohen MD   metFORMIN (GLUCOPHAGE-XR) 500 MG extended release tablet Take 4 tablets by mouth daily (with breakfast)  Patient taking differently: Take 2,000 mg by mouth Daily with supper  21  Yes Kalia Cohen MD   atorvastatin (LIPITOR) 80 MG tablet Take 1 tablet by mouth nightly 21  Yes Sergo Baker MD   BLOOD GLUCOSE MONITOR 1 Device One Touch Verio Flex meter    Historical Provider, MD   blood glucose monitor strips 125 strips by Other route Test 4 times a day & as needed for symptoms of irregular blood glucose. Dispense sufficient amount for indicated testing frequency plus additional to accommodate PRN testing needs     verio flex testing strips. Historical Provider, MD   Lancets Micro Thin 33G MISC by Does not apply route One touch Verio Flex    Historical Provider, MD   Insulin Pen Needle ([employer] PEN NEEDLES) 31G X 6 MM MISC 1 each by Does not apply route 5 times daily May substitute brand based on insurance formulary 9/25/21   Danay Schilling MD   fluticasone [facility]) 50 MCG/ACT nasal spray 2 sprays by Each Nostril route daily 3/25/21   Deb Ruelas MD   aspirin-acetaminophen-caffeine (EXCEDRIN MIGRAINE) 670-256-14 MG per tablet Take 1 tablet by mouth every 6 hours as needed for Headaches 10/20/20   Deb Ruelas MD       Current medications:    Current Facility-Administered Medications   Medication Dose Route Frequency Provider Last Rate Last Admin    lactated ringers infusion   IntraVENous Continuous Mikayla Clark MD        sodium chloride flush 0.9 % injection 5-40 mL  5-40 mL IntraVENous 2 times per day Mikayla Clark MD        sodium chloride flush 0.9 % injection 5-40 mL  5-40 mL IntraVENous PRN Mikayla Clark MD        0.9 % sodium chloride infusion  25 mL IntraVENous PRN Mikayla Clark MD        lidocaine PF 1 % injection 1 mL  1 mL IntraDERmal Once PRN Mikayla Clark MD        atenolol (TENORMIN) tablet 25 mg  25 mg Oral Once Kim Rebolledo MD           Allergies:     Allergies   Allergen Reactions    No Known Allergies        Problem List:    Patient Active Problem List   Diagnosis Code    Allergic rhinitis J30.9    Generalized anxiety disorder with panic attacks F41.1, F41.0    Chronic daily headache R51.9    Mid back pain, chronic M54.9, G89.29    Essential hypertension I10    Mucous retention cyst of maxillary sinus J34.1    Diabetes mellitus type 2, insulin dependent (HCC) E11.9, Z79.4    Mild episode of recurrent major depressive disorder (HCC) F33.0    Tourette syndrome F95.2    Autism spectrum disorder F84.0    Rectal bleeding K62.5       Past Medical History:        Diagnosis Date    Anxiety     Depression     Hypertension        Past Surgical History:  History reviewed. No pertinent surgical history. Social History:    Social History     Tobacco Use    Smoking status: Never Smoker    Smokeless tobacco: Never Used   Substance Use Topics    Alcohol use: Yes     Comment: Occasional, 2-3 x/wk, 1 mixed drink                                Counseling given: Not Answered      Vital Signs (Current):   Vitals:    03/14/22 0850   BP: (!) 147/92   Pulse: 80   Resp: 15   Temp: 98.4 °F (36.9 °C)   TempSrc: Temporal   SpO2: 95%   Weight: 231 lb 6.4 oz (105 kg)   Height: 5' 10\" (1.778 m)                                              BP Readings from Last 3 Encounters:   03/14/22 (!) 147/92   03/10/22 126/82   02/16/22 126/85       NPO Status:                                                   Date of last liquid consumption: 03/13/22                        Date of last solid food consumption: 03/13/22    BMI:   Wt Readings from Last 3 Encounters:   03/14/22 231 lb 6.4 oz (105 kg)   03/10/22 236 lb 3.2 oz (107.1 kg)   02/16/22 234 lb (106.1 kg)     Body mass index is 33.2 kg/m².     CBC:   Lab Results   Component Value Date    WBC 6.7 09/25/2021    RBC 4.44 09/25/2021    HGB 14.2 09/25/2021    HCT 40.9 09/25/2021    MCV 92.1 09/25/2021    RDW 13.0 09/25/2021     09/25/2021       CMP:   Lab Results   Component Value Date     09/25/2021    K 3.6 09/25/2021     09/25/2021    CO2 26 09/25/2021    BUN 11 09/25/2021    CREATININE 0.7 09/25/2021    GFRAA >60 09/25/2021    AGRATIO 1.2 09/22/2021    LABGLOM >60 09/25/2021    GLUCOSE 218 09/25/2021    PROT 7.6 09/22/2021    CALCIUM 9.3 09/25/2021    BILITOT 0.4 09/22/2021    ALKPHOS 75 09/22/2021     09/22/2021     09/22/2021       POC Tests: No results for input(s): POCGLU, POCNA, POCK, POCCL, POCBUN, POCHEMO, POCHCT in the last 72 hours. Coags: No results found for: PROTIME, INR, APTT    HCG (If Applicable): No results found for: PREGTESTUR, PREGSERUM, HCG, HCGQUANT     ABGs: No results found for: PHART, PO2ART, ERY5VFJ, IJJ7EWK, BEART, S6WBNSJN     Type & Screen (If Applicable):  No results found for: LABABO, LABRH    Drug/Infectious Status (If Applicable):  No results found for: HIV, HEPCAB    COVID-19 Screening (If Applicable): No results found for: COVID19        Anesthesia Evaluation    Airway: Mallampati: II  TM distance: >3 FB   Neck ROM: full  Mouth opening: > = 3 FB Dental: normal exam         Pulmonary: breath sounds clear to auscultation      (-) COPD, asthma, sleep apnea and not a current smoker                           Cardiovascular:    (+) hypertension:,     (-) past MI, CAD, CABG/stent, dysrhythmias,  angina,  CHF, orthopnea and PND      Rhythm: regular  Rate: normal           Beta Blocker:  Not on Beta Blocker         Neuro/Psych:   (+) headaches:, psychiatric history: stable with treatmentdepression/anxiety    (-) seizures, TIA and CVA           GI/Hepatic/Renal:        (-) GERD, PUD and no morbid obesity       Endo/Other:    (+) DiabetesType II DM, using insulin, no malignancy/cancer. (-) hypothyroidism, hyperthyroidism, no malignancy/cancer               Abdominal:             Vascular: Other Findings:             Anesthesia Plan      general     ASA 2       Induction: intravenous. MIPS: Postoperative opioids intended and Prophylactic antiemetics administered. Anesthetic plan and risks discussed with patient. Plan discussed with CRNA.                   Gay Esparza MD   3/14/2022

## 2022-03-21 ENCOUNTER — HOSPITAL ENCOUNTER (OUTPATIENT)
Dept: PHYSICAL THERAPY | Age: 27
Setting detail: THERAPIES SERIES
Discharge: HOME | End: 2022-03-21
Payer: MEDICAID

## 2022-03-21 PROCEDURE — 97110 THERAPEUTIC EXERCISES: CPT

## 2022-03-21 PROCEDURE — 97162 PT EVAL MOD COMPLEX 30 MIN: CPT

## 2022-03-21 NOTE — FLOWSHEET NOTE
bent knee fall out for hip IR stretch  30s x 3 alt rj  [x]      []      []      []      []      []      []      []      []      []      []      []      []      Home Exercise Program:     HEP instruction: Access Code: [email or web address]  URL: [email or web address]  Date: 03/21/2022  Prepared by: Samuel Gill  Exercises  Hooklying Active Hamstring Stretch - 2 x daily - 7 x weekly - 1 sets - 3 reps - 30s hold  Long Sitting Hamstring Stretch - 2 x daily - 7 x weekly - 1 sets - 3 reps - 30s hold  Supine Straight Leg Lumbar Rotation Stretch - 2 x daily - 7 x weekly - 1 sets - 3 reps - 30s hold  Beginner Bridge - 1-2 x daily - 7 x weekly - 2 sets - 10 reps - 10s hold  Prone Bent Leg Fallout - 2 x daily - 7 x weekly - 1 sets - 3 reps - 30s hold       Therapeutic Exercise:   [x] (92363) Provided verbal/tactile cueing for activities related to strengthening, flexibility, endurance, ROM for improvements in LE, proximal hip, and core control with self-care, mobility, lifting, ambulation. NMR:  [] (86805) Provided verbal/tactile cueing for activities related to improving balance, coordination, kinesthetic sense, posture, motor skill, proprioception to assist with LE, proximal hip, and core control in self-care, mobility, lifting, ambulation and eccentric single leg control. Therapeutic Activities:    [] (69572) Provided verbal/tactile cueing for activities related to improving balance, coordination, kinesthetic sense, posture, motor skill, proprioception and motor activation to allow for proper function of core, proximal hip and LE with self-care and ADLs and functional mobility.      Gait Training:    [] (90484) Provided training and instruction to the patient for proper LE, core and proximal hip recruitment and positioning and eccentric body weight control with ambulation re-education including up and down stairs     Manual Treatments:  PROM / STM / Oscillations-Mobs:  G-I, II, III, IV (PA's, Inf., Post.)  [] (77178) Provided manual therapy to mobilize LE, proximal hip and/or LS spine soft tissue/joints for the purpose of modulating pain, promoting relaxation,  increasing ROM, reducing/eliminating soft tissue swelling/inflammation/restriction, improving soft tissue extensibility and allowing for proper ROM for normal function with self-care, mobility, lifting and ambulation. Home Exercise Program:    [x] (78266) Reviewed/Progressed HEP activities related to strengthening, flexibility, endurance, ROM of core, proximal hip and LE for functional self-care, mobility, lifting and ambulation/stair navigation   [] (32049) Reviewed/Progressed HEP activities related to improving balance, coordination, kinesthetic sense, posture, motor skill, proprioception of core, proximal hip and LE for self-care, mobility, lifting, and ambulation/stair navigation      Modalities:    [] Electric Stimulation:   [] Ultrasound:   [] Other:       Charges:  Timed Code Treatment Minutes: 20 mins Mod complexity eval, 23 mins TE   Total Treatment Minutes: 43      [] EVAL (LOW) 86491 (typically 20 minutes face-to-face)  [x] EVAL (MOD) 64180 (typically 30 minutes face-to-face)  [] EVAL (HIGH) 31818 (typically 45 minutes face-to-face)  [] RE-EVAL     [x] VK(00961) x   2    [] NMR (70944) x       [] Manual (72584) x       [] TA (30167) x       [] Gait Training [phone] x       [] ES(attended) (06313)  [] ES (un) ([phone])   [] DRY NEEDLE 1 OR 2 MUSCLES  [] DRY NEEDLE 3+ MUSCLES  [] Mech Traction (56930)  [] Ultrasound (44787)  [] Other:    GOALS:  Patient stated goal: reduce pain, comfortably be able to sit and stand  []? Progressing: []? Met: []? Not Met: []? Adjusted  Therapist goals for Patient:   Short Term Goals: To be achieved in: 2 weeks  1. Independent in HEP and progression per patient tolerance, in order to prevent re-injury. []? Progressing: []? Met: []? Not Met: []? Adjusted  2.  Patient will have a decrease in pain to resting pain 0/10, worse pain 4/10 facilitate improvement in movement, function, and ADLs as indicated by Functional Deficits. []? Progressing: []? Met: []? Not Met: []? Adjusted     Long Term Goals: To be achieved in: 4 weeks  1. Disability index score of 10% or less on the KRISSY to assist with reaching prior level of function. []? Progressing: []? Met: []? Not Met: []? Adjusted  2. Patient will demonstrate increased AROM to WNL, good LS mobility, good hip ROM to allow for proper joint functioning as indicated by patients Functional Deficits ( hamstring length 90/90 to less than 20 deg from neutral knee ext, hip IR AROM to >/=40deg). []? Progressing: []? Met: []? Not Met: []? Adjusted  3. Patient will demonstrate an increase in Strength to good proximal hip and core activation to allow for proper functional mobility as indicated by patients Functional Deficits. []? Progressing: []? Met: []? Not Met: []? Adjusted          ASSESSMENT:  See eval      Treatment/Activity Tolerance:  [x] Patient tolerated treatment well [] Patient limited by fatique  [] Patient limited by pain  [] Patient limited by other medical complications  [] Other:     Overall Progression Towards Functional goals/ Treatment Progress Update:  [] Patient is progressing as expected towards functional goals listed. [] Progression is slowed due to complexities/Impairments listed. [] Progression has been slowed due to co-morbidities.   [x] Plan just implemented, too soon to assess goals progression <30days   [] Goals require adjustment due to lack of progress  [] Patient is not progressing as expected and requires additional follow up with physician  [] Other    Prognosis for POC: [x] Good [] Fair  [] Poor    Patient requires continued skilled intervention: [x] Yes  [] No        PLAN: See eval  [x] Continue per plan of care [] Alter current plan (see comments)  [x] Plan of care initiated [] Hold pending MD visit [] Discharge    Electronically signed by: Carlos Weber, PT DPT  Note: If patient does not return for scheduled/recommended follow up visits, this note will serve as a discharge from care along with the most recent update on progress.

## 2022-03-21 NOTE — PLAN OF CARE
[facility] Outpatient Therapy  [address] [place] [address], JASIEL Kimball [address]  Phone: [phone]   Fax: [phone]        Physical Therapy Certification    Dear Referring Practitioner: Dr Amelia Orozco,    We had the pleasure of evaluating the following patient for physical therapy services at [facility] [place] ([facility]). A summary of our findings can be found in the initial assessment below. This includes our plan of care. If you have any questions or concerns regarding these findings, please do not hesitate to contact me at the office phone number checked above. Thank you for the referral.       Physician Signature:_______________________________Date:__________________  By signing above (or electronic signature), therapist's plan is approved by physician      Patient: Liliya Owens   : 1995   MRN: [id number]  Referring Physician: Referring Practitioner: Dr Amelia Orozco      Evaluation Date: 3/21/2022      Medical Diagnosis Information:  Diagnosis: M54.9 G89.29 Mid Back pain, chronic   Treatment Diagnosis: M54.9 G89.29 Mid Back pain, chronic                                         Insurance information: PT Insurance Information: [employer]     Precautions/ Contra-indications:   Latex Allergy:  [x]NO      [place]  Preferred Language for Healthcare:   [x]English       []other:  C-SSRS Triggered by Intake questionnaire (Past 2 wk assessment):   [x] No, Questionnaire did not trigger screening.   [] Yes, Patient intake triggered further evaluation      [] C-SSRS Screening completed  [] PCP notified via Plan of Care  [] Emergency services notified    SUBJECTIVE:  History of Present Illness:      Pt presents with c/o chronic back pain, hx of 1 yr, but recent exacerbation in 2021, progressively gotten worse since then. Pt denies trauma/injury, just continued to get worse.  Also complaints of right knee pain that also was assoc with onset. Complicated by recent dx of DM, is better managed now, but was suppose to do therapy in Sept when this occurred but then got sick and had to put PT off. Returns to day with worsening complaints, does radiate into the mid back. Pain       Patient reports pain is 3/10 pain at present and 8/10 pain at its worst. Best 2-3/10. Pain increases with: any movement of back       Decreases with: rest otc meds, heat    Pain description:  Tense, tight, denies numbness or tingling, mid back is more painful achy  Pt. reports pain with coughing, sneezing and laughing:  []Yes   [x]No   []NA  Pt. reports bowel and bladder changes:      []Yes   [x]No   []NA   Pt. reports knee/hip/ankle buckling:      []Yes   [x]No   []NA  Denies unexplained weight loss. Current Functional Limitations:   [x]Yes   []No  Functional Complaints:    Any spinal movement. PLOF:   []No functional limitations   [x]Pre-exisiting limitations: managed pain, not every day not constant  Pt's sleep is affected? []Yes   [x]No        Social support/Environment:  Indep community ambulator and , lives alone.      Relevant Medical History:   Co-morbidities/Complexities (which will affect course of rehabilitation):   []None           Arthritic conditions   []Rheumatoid arthritis (M05.9)  []Osteoarthritis (M19.91)   Cardiovascular conditions   [x]Hypertension (I10)  [x]Hyperlipidemia (E78.5)  []Angina pectoris (I20)  []Atherosclerosis (I70)   Musculoskeletal conditions   []Disc pathology   []Congenital spine pathologies   []Prior surgical intervention  []Osteoporosis (M81.8)  []Osteopenia (M85.8)   Endocrine conditions   []Hypothyroid (E03.9)  []Hyperthyroid Gastrointestinal conditions   []Constipation (Y20.27)   Metabolic conditions   [x]Morbid obesity (E66.01)  [x]Diabetes type 1(E10.65) or 2 (E11.65)   []Neuropathy (G60.9)     Pulmonary conditions   []Asthma (J45)  []Coughing   []COPD (J44.9)   Psychological Disorders  [x]Anxiety (F41.9)  [x]Depression (F32.9)   []Other:   []Other: BMI 33.20kg/m2, breathing issues with recent repair of deviated nasal septum, seasonal allergies, Migraines, GERD   XRAYS 9/2021:   1.  Mild retrolisthesis of L5 on S1.   2.  No acute osseous findings in the thoracolumbar spine. Occupation/SchoolSports/Hobbies/Recreational Activities: unemployed due to medial issues, likes libraries and nonprofits. Sedentary lifestyle    OBJECTIVE:     Functional Scale/Score: 17/50, 34% disability per KRISSY    Gait/Steps/Balance       []Gait                             [x]Deviations on a level linoleum surface include:   Forward flexion     Posture: [] WNL  [x]Forward head   [x]Forward flexed trunk    []Scoliosis   []Decreased WB on   []R   []L     []Other:        Quick Tests/Functional Myotome Tests:   Heel Walk (L4):      []NT  [x]Able to perform WNL   []Unable to perform         Toe Walk (S1):       []NT  [x]Able to perform WNL   []Unable to perform        Lumbar Range of MotionTesting      [x]All WFL except as marked below  ROM  Deficits         *denotes pain AROM   COMMENTS   Flexion 9 inches from 3rd dip to floor Standing position for all    Extension     Sidebending Left 1 inch inf popliteal fossa    Sidebending Right 1 inch above popliteal fossa    Rotation Left   []Seated  [x]Standing       Rotation Right  []Seated  [x]Standing         Special Tests- Standing   (C)= [place]ka's Criteria: 3/4 Positive tests or (+) Fortins sign with two additional (+) tests  Special Test Abnormal Findings   Michael's Sign                (C)                  [x]Neg   []Pos R   []Pos L      Standing Landmarks []Iliac Crests Equal   []R High  []L High  []PSIS Equal   []R High  []L High  []ASIS Equal   []R High  []L High     [x]Difficult to assess due to body habitus    Standing Flexion Test  (C) [x]Neg   []Pos R   []Pos L      March Test (Gillet's Test) [x]Neg   []Pos R   []Pos L      Sacral Sulci Test  [x]Neg   []Pos R   []Pos L comorbities such as DM obesity and HTN/Hyperlipidemia and breathing issues. Functional Impairments:     [x]Noted lumbar/proximal hip hypomobility   []Noted lumbosacral and/or generalized hypermobility   [x]Decreased Lumbosacral/hip/LE functional ROM   [x]Decreased core/proximal hip strength and neuromuscular control    [x]Decreased LE functional strength    []Abnormal reflexes/sensation/myotomal/dermatomal deficits  []Reduced balance/proprioceptive control    []other:      Functional Activity Limitations (from functional questionnaire and intake)   [x]Reduced ability to tolerate prolonged functional positions   [x]Reduced ability or difficulty with changes of positions or transfers between positions   [x]Reduced ability to maintain good posture and demonstrate good body mechanics with sitting, bending, and lifting   [x]Reduced ability to sleep   [x] Reduced ability or tolerance with driving and/or computer work   [x]Reduced ability to perform lifting, reaching, carrying tasks   [x]Reduced ability to squat   [x]Reduced ability to forward bend   [x]Reduced ability to ambulate prolonged functional periods/distances/surfaces   [x]Reduced ability to ascend/descend stairs   []other:       Participation Restrictions   [x]Reduced participation in self care activities   [x]Reduced participation in home management activities   [x]Reduced participation in work activities   [x]Reduced participation in social activities. []Reduced participation in sport/recreational activities. Classification:   []Signs/symptoms consistent with Lumbar instability/stabilization subgroup. []Signs/symptoms consistent with Lumbar mobilization/manipulation subgroup, myotomes and dermatomes intact. Meets manipulation criteria.     []Signs/symptoms consistent with Lumbar direction specific/centralization subgroup   []Signs/symptoms consistent with Lumbar traction subgroup       []Signs/symptoms consistent with lumbar facet dysfunction   []Signs/symptoms consistent with lumbar stenosis type dysfunction   []Signs/symptoms consistent with nerve root involvement including myotome & dermatome dysfunction   []Signs/symptoms consistent with post-surgical status including: decreased ROM, strength and function. [x]signs/symptoms consistent with pathology which may benefit from Dry needling     []other:      Prognosis/Rehab Potential:      []Excellent   [x]Good    []Fair   []Poor    Tolerance of evaluation/treatment:    []Excellent   [x]Good    []Fair   []Poor     Physical Therapy Evaluation Complexity Justification  [x] A history of present problem with:  [] no personal factors and/or comorbidities that impact the plan of care;  []1-2 personal factors and/or comorbidities that impact the plan of care  [x]3 personal factors and/or comorbidities that impact the plan of care  [x] An examination of body systems using standardized tests and measures addressing any of the following: body structures and functions (impairments), activity limitations, and/or participation restrictions;:  [] a total of 1-2 or more elements   [x] a total of 3 or more elements   [] a total of 4 or more elements   [x] A clinical presentation with:  [] stable and/or uncomplicated characteristics   [x] evolving clinical presentation with changing characteristics  [] unstable and unpredictable characteristics;   [x] Clinical decision making of [] low, [x] moderate, [] high complexity using standardized patient assessment instrument and/or measurable assessment of functional outcome.     [] EVAL (LOW) 30146 (typically 20 minutes face-to-face)  [x] EVAL (MOD) 79626 (typically 30 minutes face-to-face)  [] EVAL (HIGH) 94590 (typically 45 minutes face-to-face)  [] RE-EVAL     PLAN: Begin PT focusing on: proximal hip mobilizations, LB mobs, LB core activation, proximal hip activation, and HEP    Frequency/Duration:  1-2 days per week for  4 Weeks:  Interventions:  [x]  Therapeutic exercise including: strength training, ROM, for Lower extremity and core   [x]  NMR activation and proprioception for LE, Glutes and Core. [x]  Manual therapy as indicated for LE, Hip and spine to include: Dry Needling/IASTM, STM, PROM, Gr I-IV mobilizations, manipulation. [x] Therapeutic activities for LE and core.  [] Gait Training including gait normalization and reducing fall risk. [x] Modalities as needed that may include: thermal agents, E-stim, Biofeedback, US, iontophoresis as indicated  [x] Mechanical Lumbar traction     HEP instruction: Access Code: [email or web address]  URL: [employer]/  Date: 03/21/2022  Prepared by: Pam Estrella  Exercises  Hooklying Active Hamstring Stretch - 2 x daily - 7 x weekly - 1 sets - 3 reps - 30s hold  Long Sitting Hamstring Stretch - 2 x daily - 7 x weekly - 1 sets - 3 reps - 30s hold  Supine Straight Leg Lumbar Rotation Stretch - 2 x daily - 7 x weekly - 1 sets - 3 reps - 30s hold  Beginner Bridge - 1-2 x daily - 7 x weekly - 2 sets - 10 reps - 10s hold  Prone Bent Leg Fallout - 2 x daily - 7 x weekly - 1 sets - 3 reps - 30s hold      GOALS:  Patient stated goal: reduce pain, comfortably be able to sit and stand  [] Progressing: [] Met: [] Not Met: [] Adjusted  Therapist goals for Patient:   Short Term Goals: To be achieved in: 2 weeks  1. Independent in HEP and progression per patient tolerance, in order to prevent re-injury. [] Progressing: [] Met: [] Not Met: [] Adjusted  2. Patient will have a decrease in pain to resting pain 0/10, worse pain 4/10 facilitate improvement in movement, function, and ADLs as indicated by Functional Deficits. [] Progressing: [] Met: [] Not Met: [] Adjusted    Long Term Goals: To be achieved in: 4 weeks  1. Disability index score of 10% or less on the KRISSY to assist with reaching prior level of function. [] Progressing: [] Met: [] Not Met: [] Adjusted  2.  Patient will demonstrate increased AROM to WNL, good LS mobility, good hip ROM to allow for proper joint functioning as indicated by patients Functional Deficits ( hamstring length 90/90 to less than 20 deg from neutral knee ext, hip IR AROM to >/=40deg). [] Progressing: [] Met: [] Not Met: [] Adjusted  3. Patient will demonstrate an increase in Strength to good proximal hip and core activation to allow for proper functional mobility as indicated by patients Functional Deficits.    [] Progressing: [] Met: [] Not Met: [] Adjusted      Electronically signed by:  Kenny Garcia PT  DPT

## 2022-03-22 ENCOUNTER — OFFICE VISIT (OUTPATIENT)
Dept: ENT CLINIC | Age: 27
End: 2022-03-22

## 2022-03-22 VITALS — WEIGHT: 231 LBS | HEIGHT: 70 IN | BODY MASS INDEX: 33.07 KG/M2

## 2022-03-22 DIAGNOSIS — J34.2 DNS (DEVIATED NASAL SEPTUM): Primary | ICD-10-CM

## 2022-03-22 DIAGNOSIS — J34.3 NASAL TURBINATE HYPERTROPHY: ICD-10-CM

## 2022-03-22 DIAGNOSIS — J34.89 NASAL OBSTRUCTION: ICD-10-CM

## 2022-03-22 PROCEDURE — 99024 POSTOP FOLLOW-UP VISIT: CPT | Performed by: OTOLARYNGOLOGY

## 2022-03-22 NOTE — PROGRESS NOTES
[facility]  [address] [facility], [address] [place] [address] Ave  P: [phone]  F: [phone]       Patient     Lisa Risk  1995    ChiefComplaint     Chief Complaint   Patient presents with    Post-Op Check     Patient is here today for his post-op follow up and splint removal he is doing well with no complaints or concerns       History of Present Illness     Lisa Risk is a pleasant 33 y.o. male here for 1 week postop visit for septoplasty and turbinate reduction. Overall doing well. No significant issues with bleeding. Pain is well controlled. Has been using saline as instructed. Past Medical History     Past Medical History:   Diagnosis Date    Anxiety     Depression     Diabetes mellitus (HCC)     GERD (gastroesophageal reflux disease)     Hyperlipidemia     Hypertension     Migraines     Seasonal allergies        Past Surgical History     Past Surgical History:   Procedure Laterality Date    COLONOSCOPY      SEPTOPLASTY Bilateral 3/14/2022    BILATERAL INFERIOR TURBINATE REDUCTION AND SEPTOPLASTY performed by Manjeet Alvarez DO at [facility] History     Family History   Problem Relation Age of Onset    Cancer Mother         melanoma    Hypertension Mother     Diabetes Mother     Other Mother         concerns for hoarding    Hypertension Father     Anxiety Disorder Father     Diabetes Father     Suicide Attempts Father         pills       Social History     Social History     Socioeconomic History    Marital status: Single     Spouse name: Not on file    Number of children: 0    Years of education: 25    Highest education level:  Bachelor's degree (e.g., BA, AB, BS)   Occupational History    Not on file   Tobacco Use    Smoking status: Never Smoker    Smokeless tobacco: Never Used   Vaping Use    Vaping Use: Never used   Substance and Sexual Activity    Alcohol use: Yes     Comment: Occasional, 2-3 x/wk, 1 mixed drink    Drug use: Allergies   Allergen Reactions    No Known Allergies        Medications     Current Outpatient Medications   Medication Sig Dispense Refill    cetirizine (ZYRTEC) 5 MG tablet Take 5 mg by mouth daily      omeprazole (PRILOSEC) 40 MG delayed release capsule TAKE ONE CAPSULE BY MOUTH EVERY MORNING BEFORE BREAKFAST 30 capsule 1    buPROPion (WELLBUTRIN XL) 300 MG extended release tablet TAKE ONE TABLET BY MOUTH EVERY MORNING 30 tablet 2    ondansetron (ZOFRAN) 4 MG tablet TAKE ONE TABLET BY MOUTH DAILY AS NEEDED FOR NAUSEA OR VOMITING 30 tablet 0    ARIPiprazole (ABILIFY) 5 MG tablet Take 1 tablet by mouth daily 30 tablet 3    insulin glargine (LANTUS SOLOSTAR) 100 UNIT/ML injection pen Inject 20 Units into the skin daily 5 pen 3    escitalopram (LEXAPRO) 20 MG tablet TAKE ONE TABLET BY MOUTH DAILY 30 tablet 5    lisinopril (PRINIVIL;ZESTRIL) 10 MG tablet TAKE ONE TABLET BY MOUTH DAILY 30 tablet 5    atenolol (TENORMIN) 25 MG tablet TAKE ONE TABLET BY MOUTH DAILY 30 tablet 5    BLOOD GLUCOSE MONITOR 1 Device One Touch Verio Flex meter      blood glucose monitor strips 125 strips by Other route Test 4 times a day & as needed for symptoms of irregular blood glucose. Dispense sufficient amount for indicated testing frequency plus additional to accommodate PRN testing needs     verio flex testing strips.       Lancets Micro Thin 33G MISC by Does not apply route One touch Verio Flex      metFORMIN (GLUCOPHAGE-XR) 500 MG extended release tablet Take 4 tablets by mouth daily (with breakfast) (Patient taking differently: Take 2,000 mg by mouth Daily with supper ) 120 tablet 5    Insulin Pen Needle ([employer] PEN NEEDLES) 31G X 6 MM MISC 1 each by Does not apply route 5 times daily May substitute brand based on insurance formulary 100 each 3    atorvastatin (LIPITOR) 80 MG tablet Take 1 tablet by mouth nightly 30 tablet 3    fluticasone (FLONASE) 50 MCG/ACT nasal spray 2 sprays by Each Nostril route daily 1 Bottle 5    aspirin-acetaminophen-caffeine (EXCEDRIN MIGRAINE) 453-447-78 MG per tablet Take 1 tablet by mouth every 6 hours as needed for Headaches 90 tablet 3     No current facility-administered medications for this visit. Review of Systems     Review of Systems      PhysicalExam     There were no vitals filed for this visit. Physical Exam  Multani splints removed. Nasal septum midline. No perforation or hematoma. Turbinates normal in caliber. Nasal passages widely patent. Procedure           Assessment and Plan     1. DNS (deviated nasal septum)  Patient doing well status post septoplasty and turbinate reduction. Multani splints removed today. Continue activity restriction for 1 week. Continue nasal saline for 1 week. Follow-up in 2 months    2. Nasal turbinate hypertrophy      3. Nasal obstruction        Return in about 2 months (around 5/22/2022). Portions of this note were dictated using Dragon.  There may be linguistic errors secondary to the use of this program.

## 2022-03-24 ENCOUNTER — HOSPITAL ENCOUNTER (OUTPATIENT)
Dept: PHYSICAL THERAPY | Age: 27
Setting detail: THERAPIES SERIES
Discharge: HOME | End: 2022-03-24
Payer: MEDICAID

## 2022-03-24 PROCEDURE — 97110 THERAPEUTIC EXERCISES: CPT

## 2022-03-24 PROCEDURE — 97112 NEUROMUSCULAR REEDUCATION: CPT

## 2022-03-24 NOTE — FLOWSHEET NOTE
[facility]  [address], [address]  Phone: [phone]   Fax: [phone]    Physical Therapy Treatment Note/ Progress Report:     Date:  3/24/2022    Patient Name:  Guzman Hill    :  1995  MRN: [id number]    Medical/Treatment Diagnosis Information:  · Diagnosis: M54.9 G89.29 Mid Back pain, chronic  · Treatment Diagnosis: M54.9 G89.29 Mid Back pain, chronic  Insurance/Certification information:  PT Insurance Information: [facility]  Physician Information:  Referring Practitioner: Dr Khalida Peoples of care signed:    [] Yes  [x] No    Date of Patient follow up with Physician: [facility]       Progress Report: []  Yes  [x]  No     Date Range for reporting period:  Beginning:  Eval date on 3/21/2022  Ending:      Progress report due (10 Rx/or 30 days whichever is less):      Recertification due (POC duration/ or 90 days whichever is less):     Visit #  2-8 Insurance Allowable 30/yr Auth Needed NO     RESTRICTIONS/PRECAUTIONS: DM HTN hyperlipidemia uncontrolled medical conditions   Latex Allergy:  [x]NO      []YES  Preferred Language for Healthcare:   [x]English       []other:  Functional Scale: KRISSY, score 17/50, 34% disability Date assessed:3/21/2022    Pain level:  6-7/10 last night mid and low back pain, upon arrival 0/10     SUBJECTIVE:  Difficult finding a comfort position last night, but once was a sleep, he was able to sleep well. Pt reports using heat as recommended as well and feel it has help some. OBJECTIVE:    Observation:    Test measurements:      Exercises/Interventions: Exercises in bold performed in department today. Items not bolded are carried forward from prior visits for continuity of the record.     Exercise/Equipment Resistance/Repetitions HEP Other comments   Therapeutic ex review of HEP and progression    Seated hamstring stretch edge of mat           30s x 3 alt ble [x] PT provided recall on exs in HEP, but able to demo following instruction, intermittent cues for pelvic and lumbar spine neutral positions and awareness    Supine hamstring stretch    30s x 3 reps alt ble [x]    LE rotation hooklying    30s x 3 right and left rj  [x]    Supine straight leg lumbar rotation stretch/IT band hip rotation stretch       30s x 3 alt rj  [x]    bridge 10s x 10 reps 2 sets [x]    Prone bent knee fall out for hip IR stretch  30s x 3 alt rj  [x]    3/24/2022 Added  SKTC  DKTC     30s x 3 rj  [x]      []    NMR: postural training/education for pelvic positioning and influences on spine and LEs   And to improve marjan and pelvic/hip and spine neutral in supine SL sitting and standing  PT demo'd and handouts given for inc understanding and compliance      PT assisted with using pillows to set up for positioning in bed. Also used  bucket of water analogy for finding pelvic neutral  [x]      []      []      []      []      []      []      []      []      []      Home Exercise Program:  HEP instruction: Access Code: [email or web address]  URL: [email or web address]  Date: 03/21/2022  Prepared by: Jovany Handsome  Exercises  Hooklying Active Hamstring Stretch - 2 x daily - 7 x weekly - 1 sets - 3 reps - 30s hold  Long Sitting Hamstring Stretch - 2 x daily - 7 x weekly - 1 sets - 3 reps - 30s hold  Supine Straight Leg Lumbar Rotation Stretch - 2 x daily - 7 x weekly - 1 sets - 3 reps - 30s hold  Beginner Bridge - 1-2 x daily - 7 x weekly - 2 sets - 10 reps - 10s hold  Prone Bent Leg Fallout - 2 x daily - 7 x weekly - 1 sets - 3 reps - 30s hold     Therapeutic Exercise:   [x] (14303) Provided verbal/tactile cueing for activities related to strengthening, flexibility, endurance, ROM for improvements in LE, proximal hip, and core control with self-care, mobility, lifting, ambulation.     NMR:  [x] (49831) Provided verbal/tactile cueing for activities related to improving balance, coordination, kinesthetic sense, posture, motor skill, proprioception to assist with LE, proximal hip, and core control in self-care, mobility, lifting, ambulation and eccentric single leg control. Therapeutic Activities:    [] (69101) Provided verbal/tactile cueing for activities related to improving balance, coordination, kinesthetic sense, posture, motor skill, proprioception and motor activation to allow for proper function of core, proximal hip and LE with self-care and ADLs and functional mobility. Gait Training:    [] (81928) Provided training and instruction to the patient for proper LE, core and proximal hip recruitment and positioning and eccentric body weight control with ambulation re-education including up and down stairs     Manual Treatments:  PROM / STM / Oscillations-Mobs:  G-I, II, III, IV (PA's, Inf., Post.)  [] (40984) Provided manual therapy to mobilize LE, proximal hip and/or LS spine soft tissue/joints for the purpose of modulating pain, promoting relaxation,  increasing ROM, reducing/eliminating soft tissue swelling/inflammation/restriction, improving soft tissue extensibility and allowing for proper ROM for normal function with self-care, mobility, lifting and ambulation.      Home Exercise Program:    [x] (68039) Reviewed/Progressed HEP activities related to strengthening, flexibility, endurance, ROM of core, proximal hip and LE for functional self-care, mobility, lifting and ambulation/stair navigation   [x] (95274) Reviewed/Progressed HEP activities related to improving balance, coordination, kinesthetic sense, posture, motor skill, proprioception of core, proximal hip and LE for self-care, mobility, lifting, and ambulation/stair navigation      Modalities:    [] Electric Stimulation:   [] Ultrasound:   [] Other:       Charges:  Timed Code Treatment Minutes: 30 mins TE, 15 mins NMR   Total Treatment Minutes: 45      [] EVAL (LOW) 50399 (typically 20 minutes face-to-face)  [] EVAL (MOD) 69265 (typically 30 minutes face-to-face)  [] EVAL (HIGH) 08283 (typically 45 minutes face-to-face)  [] RE-EVAL     [x] MY(27723) x   2    [x] NMR (65453) x    1   [] Manual (87951) x       [] TA (97772) x       [] Gait Training (65029) x       [] ES(attended) (63090)  [] ES (un) (81939)   [] DRY NEEDLE 1 OR 2 MUSCLES  [] DRY NEEDLE 3+ MUSCLES  [] Mech Traction (58882)  [] Ultrasound (36872)  [] Other:    GOALS:  Patient stated goal: reduce pain, comfortably be able to sit and stand  []? Progressing: []? Met: []? Not Met: []? Adjusted  Therapist goals for Patient:   Short Term Goals: To be achieved in: 2 weeks  1. Independent in HEP and progression per patient tolerance, in order to prevent re-injury. []? Progressing: []? Met: []? Not Met: []? Adjusted  2. Patient will have a decrease in pain to resting pain 0/10, worse pain 4/10 facilitate improvement in movement, function, and ADLs as indicated by Functional Deficits. []? Progressing: []? Met: []? Not Met: []? Adjusted     Long Term Goals: To be achieved in: 4 weeks  1. Disability index score of 10% or less on the KRISSY to assist with reaching prior level of function. []? Progressing: []? Met: []? Not Met: []? Adjusted  2. Patient will demonstrate increased AROM to WNL, good LS mobility, good hip ROM to allow for proper joint functioning as indicated by patients Functional Deficits ( hamstring length 90/90 to less than 20 deg from neutral knee ext, hip IR AROM to >/=40deg). []? Progressing: []? Met: []? Not Met: []? Adjusted  3. Patient will demonstrate an increase in Strength to good proximal hip and core activation to allow for proper functional mobility as indicated by patients Functional Deficits. []? Progressing: []? Met: []? Not Met: []? Adjusted          ASSESSMENT:  Requires supervision during HEP and progression more so for form and postural awareness. Continues to benefit from skilled PT, verbalizing less complaints since starting PT. Cont with POC. Treatment/Activity Tolerance:  [x] Patient tolerated treatment well [] Patient limited by fatique  [] Patient limited by pain  [] Patient limited by other medical complications  [] Other:     Overall Progression Towards Functional goals/ Treatment Progress Update:  [] Patient is progressing as expected towards functional goals listed. [] Progression is slowed due to complexities/Impairments listed. [] Progression has been slowed due to co-morbidities. [x] Plan just implemented, too soon to assess goals progression <30days   [] Goals require adjustment due to lack of progress  [] Patient is not progressing as expected and requires additional follow up with physician  [] Other    Prognosis for POC: [x] Good [] Fair  [] Poor    Patient requires continued skilled intervention: [x] Yes  [] No        PLAN:   [x] Continue per plan of care [] Alter current plan (see comments)  [x] Plan of care initiated [] Hold pending MD visit [] Discharge  Frequency/Duration:  1-2 days per week for  4 Weeks:  Interventions:  [x]? Therapeutic exercise including: strength training, ROM, for Lower extremity and core   [x]? NMR activation and proprioception for LE, Glutes and Core. [x]? Manual therapy as indicated for LE, Hip and spine to include: Dry Needling/IASTM, STM, PROM, Gr I-IV mobilizations, manipulation. [x]? Therapeutic activities for LE and core. []? Gait Training including gait normalization and reducing fall risk. [x]? Modalities as needed that may include: thermal agents, E-stim, Biofeedback, US, iontophoresis as indicated  [x]? Mechanical Lumbar traction     Electronically signed by: Lucero Tapia PT DPT  Note: If patient does not return for scheduled/recommended follow up visits, this note will serve as a discharge from care along with the most recent update on progress.

## 2022-03-28 ENCOUNTER — HOSPITAL ENCOUNTER (OUTPATIENT)
Dept: PHYSICAL THERAPY | Age: 27
Setting detail: THERAPIES SERIES
Discharge: HOME | End: 2022-03-28
Payer: MEDICAID

## 2022-03-28 PROCEDURE — 97110 THERAPEUTIC EXERCISES: CPT

## 2022-03-28 PROCEDURE — 97112 NEUROMUSCULAR REEDUCATION: CPT

## 2022-03-28 NOTE — FLOWSHEET NOTE
[facility] Outpatient [facility]  [address] [place] [address], [address], [address]  Phone: [phone]   Fax: [phone]    Physical Therapy Treatment Note/ Progress Report:     Date:  3/28/2022    Patient Name:  Christina Montes    :  1995  MRN: [id number]    Medical/Treatment Diagnosis Information:  · Diagnosis: M54.9 G89.29 Mid Back pain, chronic  · Treatment Diagnosis: M54.9 G89.29 Mid Back pain, chronic  Insurance/Certification information:  PT Insurance Information: [facility]  Physician Information:  Referring Practitioner: Dr Gari Blizzard of care signed:    [] Yes  [x] No    Date of Patient follow up with Physician: [facility]       Progress Report: []  Yes  [x]  No     Date Range for reporting period:  Beginning:  Eval date on 3/21/2022  Ending:      Progress report due (10 Rx/or 30 days whichever is less):      Recertification due (POC duration/ or 90 days whichever is less):     Visit #  3/4-8 Insurance Allowable 30/yr Auth Needed NO     RESTRICTIONS/PRECAUTIONS: DM HTN hyperlipidemia uncontrolled medical conditions   Latex Allergy:  [x]NO      []YES  Preferred Language for Healthcare:   [x]English       []other:  Functional Scale: KRISSY, score 17/50, 34% disability Date assessed:3/21/2022    Pain level: Max pain in the past week \"dull\" 2-5/10, today 2/10 centeralized mid and low back    SUBJECTIVE:  Pt reports doing the stretching more throughout the day has noticed a reduced level of pain over the weekend. OBJECTIVE:    Observation:    Test measurements:      Exercises/Interventions: Exercises in bold performed in department today. Items not bolded are carried forward from prior visits for continuity of the record.     Exercise/Equipment Resistance/Repetitions HEP Other comments   Therapeutic ex review of HEP and progression    Seated hamstring stretch edge of mat           30s x 3 alt ble [x] PT provided recall on exs in HEP, but able to demo following instruction, intermittent cues for pelvic and lumbar spine neutral positions and awareness        Supine hamstring stretch        30s x 3 reps alt ble [x] DOMs delayed onset of muscle soreness, PT provided educated to patient and to be expected with progressive HEP for core strengthening    LE rotation hooklying  30s x 3 right and left rj  [x]    Supine straight leg lumbar rotation stretch/IT band hip rotation stretch       30s x 3 alt rj  [x]      []    Prone bent knee fall out for hip IR stretch  30s x 3 alt rj  [x]    3/24/2022 Added  SKTC   DKTC     30s x 3 rj  [x]      []    NMR: postural training/education for pelvic positioning and influences on spine and LEs   And to improve marjan and pelvic/hip and spine neutral in supine SL sitting and standing  PT demo'd and handouts given for inc understanding and compliance      PT assisted with using pillows to set up for positioning in bed. Also used  bucket of water analogy for finding pelvic neutral  [x]     Hip flexion isometrics hooklying with TA  10s x 10 reps [x] Progression core LE strengthening   Added 3/28/2022    Dead Bug  15 reps alt reciprocal UE/LE  Added to HEP 3/28/2022    PPT with bridges and TA  10s x 10 reps 1 set [x]     Incline plank at counter  60s [] Too easy    Incline at lowered mat for challenge  30s [x] Cues for postural awareness mid thoraicc spine and neutral pelvic for all planks     Side plank [] Unable to do due to weakness    Modified knee bent plank rj   30s  [x]     Modified on knee plank with hip abduction rj   15 reps [x]      []      Home Exercise Program:  HEP instruction:   Access Code: [email or web address]  URL: [email or web address]  Date: 03/21/2022  Prepared by: Melina Price  Exercises  Hooklying Active Hamstring Stretch - 2 x daily - 7 x weekly - 1 sets - 3 reps - 30s hold  Long Sitting Hamstring Stretch - 2 x daily - 7 x weekly - 1 sets - 3 reps - 30s hold  Supine Straight Leg Lumbar Rotation Stretch - 2 x daily - 7 x weekly - 1 sets - 3 reps - 30s hold  Beginner Bridge - 1-2 x daily - 7 x weekly - 2 sets - 10 reps - 10s hold  Prone Bent Leg Fallout - 2 x daily - 7 x weekly - 1 sets - 3 reps - 30s hold    Access Code: [email or web address]  URL: [email or web address]  Date: 03/28/2022  Prepared by: Pk Peralta  Exercises  Hooklying Isometric Hip Flexion - 1 x daily - 3-5 x weekly - 1-2 sets - 10 reps - 10s hold  Hooklying Isometric Hip Flexion with Opposite Arm - 1 x daily - 3-5 x weekly - 1-2 sets - 10 reps - 10s hold  Dead Bug - 1 x daily - 3-5 x weekly - 1-2 sets - 15-20 reps  Plank on Counter - 1 x daily - 3-5 x weekly - 1-2 sets - 1 reps - 30s-60s hold  Full Plank - 1 x daily - 3-5 x weekly - 1-2 sets - 1 reps - 30s-60 hold  Side Plank on Elbow - 1 x daily - 7 x weekly - 1-2 sets - 1 reps - 30s-60s hold  Side Plank with Clam - 1 x daily - 3-5 x weekly - 1-2 sets - 15-20 reps     Therapeutic Exercise:   [x] (57091) Provided verbal/tactile cueing for activities related to strengthening, flexibility, endurance, ROM for improvements in LE, proximal hip, and core control with self-care, mobility, lifting, ambulation. NMR:  [x] (52426) Provided verbal/tactile cueing for activities related to improving balance, coordination, kinesthetic sense, posture, motor skill, proprioception to assist with LE, proximal hip, and core control in self-care, mobility, lifting, ambulation and eccentric single leg control. Therapeutic Activities:    [] (99700) Provided verbal/tactile cueing for activities related to improving balance, coordination, kinesthetic sense, posture, motor skill, proprioception and motor activation to allow for proper function of core, proximal hip and LE with self-care and ADLs and functional mobility.      Gait Training:    [] (62757) Provided training and instruction to the patient for proper LE, core and proximal hip recruitment and positioning and eccentric body weight control with ambulation re-education including up and down stairs     Manual Treatments:  PROM / STM / Oscillations-Mobs:  G-I, II, III, IV (PA's, Inf., Post.)  [] (42309) Provided manual therapy to mobilize LE, proximal hip and/or LS spine soft tissue/joints for the purpose of modulating pain, promoting relaxation,  increasing ROM, reducing/eliminating soft tissue swelling/inflammation/restriction, improving soft tissue extensibility and allowing for proper ROM for normal function with self-care, mobility, lifting and ambulation. Home Exercise Program:    [x] (44282) Reviewed/Progressed HEP activities related to strengthening, flexibility, endurance, ROM of core, proximal hip and LE for functional self-care, mobility, lifting and ambulation/stair navigation   [x] (04777) Reviewed/Progressed HEP activities related to improving balance, coordination, kinesthetic sense, posture, motor skill, proprioception of core, proximal hip and LE for self-care, mobility, lifting, and ambulation/stair navigation      Modalities:    [] Electric Stimulation:   [] Ultrasound:   [] Other:       Charges:  Timed Code Treatment Minutes: 30 NMR, 10 mins TE   Total Treatment Minutes: 40      [] EVAL (LOW) 84942 (typically 20 minutes face-to-face)  [] EVAL (MOD) 37955 (typically 30 minutes face-to-face)  [] EVAL (HIGH) 28372 (typically 45 minutes face-to-face)  [] RE-EVAL     [x] MU(92425) x   1    [x] NMR (35991) x   2   [] Manual (19249) x       [] TA (28958) x       [] Gait Training [phone]) x       [] ES(attended) (19668)  [] ES (un) ([phone])   [] DRY NEEDLE 1 OR 2 MUSCLES  [] DRY NEEDLE 3+ MUSCLES  [] Mech Traction (49966)  [] Ultrasound (03643)  [] Other:    GOALS:  Patient stated goal: reduce pain, comfortably be able to sit and stand  []? Progressing: []? Met: []? Not Met: []? Adjusted  Therapist goals for Patient:   Short Term Goals: To be achieved in: 2 weeks  1. Independent in HEP and progression per patient tolerance, in order to prevent re-injury. []? Progressing: []? Met: []? Not Met: []? Adjusted  2. Patient will have a decrease in pain to resting pain 0/10, worse pain 4/10 facilitate improvement in movement, function, and ADLs as indicated by Functional Deficits. []? Progressing: []? Met: []? Not Met: []? Adjusted     Long Term Goals: To be achieved in: 4 weeks  1. Disability index score of 10% or less on the KRISSY to assist with reaching prior level of function. []? Progressing: []? Met: []? Not Met: []? Adjusted  2. Patient will demonstrate increased AROM to WNL, good LS mobility, good hip ROM to allow for proper joint functioning as indicated by patients Functional Deficits ( hamstring length 90/90 to less than 20 deg from neutral knee ext, hip IR AROM to >/=40deg). []? Progressing: []? Met: []? Not Met: []? Adjusted  3. Patient will demonstrate an increase in Strength to good proximal hip and core activation to allow for proper functional mobility as indicated by patients Functional Deficits. []? Progressing: []? Met: []? Not Met: []? Adjusted      ASSESSMENT:  Requires supervision during HEP and progression more so for form and postural awareness. Good challenge with progressive core strengthening. Continues to benefit from skilled PT, verbalizing less complaints since starting PT. Cont with POC. Treatment/Activity Tolerance:  [x] Patient tolerated treatment well [] Patient limited by fatique  [] Patient limited by pain  [] Patient limited by other medical complications  [] Other:     Overall Progression Towards Functional goals/ Treatment Progress Update:  [] Patient is progressing as expected towards functional goals listed. [] Progression is slowed due to complexities/Impairments listed. [] Progression has been slowed due to co-morbidities.   [x] Plan just implemented, too soon to assess goals progression <30days   [] Goals require adjustment due to lack of progress  [] Patient is not progressing as expected and requires additional follow up with physician  [] Other    Prognosis for POC: [x] Good [] Fair  [] Poor    Patient requires continued skilled intervention: [x] Yes  [] No        PLAN:   [x] Continue per plan of care [] Alter current plan (see comments)  [x] Plan of care initiated [] Hold pending MD visit [] Discharge  Frequency/Duration:  1-2 days per week for  4 Weeks:  Interventions:  [x]? Therapeutic exercise including: strength training, ROM, for Lower extremity and core   [x]? NMR activation and proprioception for LE, Glutes and Core. [x]? Manual therapy as indicated for LE, Hip and spine to include: Dry Needling/IASTM, STM, PROM, Gr I-IV mobilizations, manipulation. [x]? Therapeutic activities for LE and core. []? Gait Training including gait normalization and reducing fall risk. [x]? Modalities as needed that may include: thermal agents, E-stim, Biofeedback, US, iontophoresis as indicated  [x]? Mechanical Lumbar traction     Electronically signed by: Makeda Washburn PT DPT  Note: If patient does not return for scheduled/recommended follow up visits, this note will serve as a discharge from care along with the most recent update on progress.

## 2022-03-31 ENCOUNTER — HOSPITAL ENCOUNTER (OUTPATIENT)
Dept: PHYSICAL THERAPY | Age: 27
Setting detail: THERAPIES SERIES
Discharge: HOME | End: 2022-03-31
Payer: MEDICAID

## 2022-03-31 ENCOUNTER — OFFICE VISIT (OUTPATIENT)
Dept: INTERNAL MEDICINE CLINIC | Age: 27
End: 2022-03-31
Payer: MEDICAID

## 2022-03-31 VITALS
DIASTOLIC BLOOD PRESSURE: 80 MMHG | SYSTOLIC BLOOD PRESSURE: 122 MMHG | OXYGEN SATURATION: 99 % | WEIGHT: 230 LBS | RESPIRATION RATE: 14 BRPM | BODY MASS INDEX: 33 KG/M2 | HEART RATE: 78 BPM

## 2022-03-31 DIAGNOSIS — E11.9 DIABETES MELLITUS TYPE 2, INSULIN DEPENDENT (HCC): Primary | ICD-10-CM

## 2022-03-31 DIAGNOSIS — Z79.4 DIABETES MELLITUS TYPE 2, INSULIN DEPENDENT (HCC): Primary | ICD-10-CM

## 2022-03-31 DIAGNOSIS — I10 ESSENTIAL HYPERTENSION: ICD-10-CM

## 2022-03-31 DIAGNOSIS — K21.9 GERD WITHOUT ESOPHAGITIS: ICD-10-CM

## 2022-03-31 LAB — HBA1C MFR BLD: 6.3 %

## 2022-03-31 PROCEDURE — G8482 FLU IMMUNIZE ORDER/ADMIN: HCPCS | Performed by: INTERNAL MEDICINE

## 2022-03-31 PROCEDURE — G8417 CALC BMI ABV UP PARAM F/U: HCPCS | Performed by: INTERNAL MEDICINE

## 2022-03-31 PROCEDURE — 99214 OFFICE O/P EST MOD 30 MIN: CPT | Performed by: INTERNAL MEDICINE

## 2022-03-31 PROCEDURE — 3044F HG A1C LEVEL LT 7.0%: CPT | Performed by: INTERNAL MEDICINE

## 2022-03-31 PROCEDURE — 2022F DILAT RTA XM EVC RTNOPTHY: CPT | Performed by: INTERNAL MEDICINE

## 2022-03-31 PROCEDURE — 1036F TOBACCO NON-USER: CPT | Performed by: INTERNAL MEDICINE

## 2022-03-31 PROCEDURE — 97110 THERAPEUTIC EXERCISES: CPT

## 2022-03-31 PROCEDURE — 97112 NEUROMUSCULAR REEDUCATION: CPT

## 2022-03-31 PROCEDURE — G8427 DOCREV CUR MEDS BY ELIG CLIN: HCPCS | Performed by: INTERNAL MEDICINE

## 2022-03-31 PROCEDURE — 83036 HEMOGLOBIN GLYCOSYLATED A1C: CPT | Performed by: INTERNAL MEDICINE

## 2022-03-31 ASSESSMENT — ENCOUNTER SYMPTOMS
BACK PAIN: 1
CONSTIPATION: 0
SHORTNESS OF BREATH: 0
DIARRHEA: 0
COUGH: 0
NAUSEA: 0
VOMITING: 0
ABDOMINAL PAIN: 0
WHEEZING: 0

## 2022-03-31 NOTE — FLOWSHEET NOTE
[facility] Outpatient Therapy  [address] [employer], [address]  Phone: [phone]   Fax: [phone]    Physical Therapy Treatment Note/ Progress Report:     Date:  3/31/2022    Patient Name:  Po Kothari    :  1995  MRN: [id number]    Medical/Treatment Diagnosis Information:  · Diagnosis: M54.9 G89.29 Mid Back pain, chronic  · Treatment Diagnosis: M54.9 G89.29 Mid Back pain, chronic  Insurance/Certification information:  PT Insurance Information: [facility]  Physician Information:  Referring Practitioner: Dr Terris Ormond of care signed:    [] Yes  [x] No    Date of Patient follow up with Physician: [facility]       Progress Report: []  Yes  [x]  No     Date Range for reporting period:  Beginning:  Eval date on 3/21/2022  Ending:      Progress report due (10 Rx/or 30 days whichever is less):      Recertification due (POC duration/ or 90 days whichever is less):     Visit #  4/4-8 Insurance Allowable 30/yr Auth Needed NO     RESTRICTIONS/PRECAUTIONS: DM HTN hyperlipidemia uncontrolled medical conditions   Latex Allergy:  [x]NO      []YES  Preferred Language for Healthcare:   [x]English       []other:  Functional Scale: KRISSY, score 17/50, 34% disability Date assessed:3/21/2022    Pain level: 1/10 upon arrival central, worse in the past week 5/10     SUBJECTIVE:  Pt admits has not been compliant with added core ex completed and rec to do last session, but has continued with stretches every day, reports did core exs once a day. Reports exs help, still achieves muscular fatigue but are not painful currently. \"I have been trying to find pelvic neutral, not sure how well its going but I'm becoming more aware of my posture and to not lean over\"     OBJECTIVE:    Observation:    Test measurements:      Exercises/Interventions: Exercises in bold performed in department today.   Items not bolded are carried forward from prior visits for continuity of the record. Exercise/Equipment Resistance/Repetitions HEP Other comments   Therapeutic ex review of HEP and progression    Seated hamstring stretch edge of mat           30s x 3 alt ble [x] PT provided recall on exs in HEP, but able to demo following instruction, intermittent cues for pelvic and lumbar spine neutral positions and awareness    Supine hamstring stretch  30s x 3 reps alt ble [x] Using belt for comfort     LE rotation hooklying    30s x 3 right and left rj  [x]    Supine straight leg lumbar rotation stretch/IT band hip rotation stretch       30s x 3 alt rj  [x]      []    Prone bent knee fall out for hip IR stretch  30s x 3 alt rj  [x]    SKTC  30s x 3 rj  [x]      []    NMR: postural training/education for pelvic positioning and influences on spine and LEs   And to improve marjan and pelvic/hip and spine neutral in supine SL sitting and standing  PT demo'd and handouts given for inc understanding and compliance    PT assisted with using pillows to set up for positioning in bed. Also used  bucket of water analogy for finding pelvic neutral  [x]     Hip flexion isometrics hooklying with TA  10s x 10 reps [x] Progression core LE strengthening   Added 3/28/2022    Dead Bug  10s x 15rep  Added to HEP 3/28/2022    PPT with bridges and TA  10s x 10 reps 1 set [x]     Incline plank at counter  60s [] Too easy    Incline at lowered mat for challenge  30s x 2 sets [x] Required postural cues for not arching spine, form cues, provided by PT using demo and tactical cues    Side plank [] Unable to do due to weakness    Modified knee bent SL  plank rj   30s  X 2 ea [x] Postural cues and form requires provided by PT demo and tactical cues    Modified on knee plank with hip abduction rj   15 reps [x] unable to hold and do at same time too challenging      []      Home Exercise Program:  HEP instruction:   Access Code: [email or web address]  URL: [email or web address]  Date: 03/21/2022  Prepared by: Wonda Bile  Exercises  Hooklying Active Hamstring Stretch - 2 x daily - 7 x weekly - 1 sets - 3 reps - 30s hold  Long Sitting Hamstring Stretch - 2 x daily - 7 x weekly - 1 sets - 3 reps - 30s hold  Supine Straight Leg Lumbar Rotation Stretch - 2 x daily - 7 x weekly - 1 sets - 3 reps - 30s hold  Beginner Bridge - 1-2 x daily - 7 x weekly - 2 sets - 10 reps - 10s hold  Prone Bent Leg Fallout - 2 x daily - 7 x weekly - 1 sets - 3 reps - 30s hold    Access Code: [email or web address]  URL: [email or web address]  Date: 03/28/2022  Prepared by: Wonda Bile  Exercises  Hooklying Isometric Hip Flexion - 1 x daily - 3-5 x weekly - 1-2 sets - 10 reps - 10s hold  Hooklying Isometric Hip Flexion with Opposite Arm - 1 x daily - 3-5 x weekly - 1-2 sets - 10 reps - 10s hold  Dead Bug - 1 x daily - 3-5 x weekly - 1-2 sets - 15-20 reps  Plank on Counter - 1 x daily - 3-5 x weekly - 1-2 sets - 1 reps - 30s-60s hold  Full Plank - 1 x daily - 3-5 x weekly - 1-2 sets - 1 reps - 30s-60 hold  Side Plank on Elbow - 1 x daily - 7 x weekly - 1-2 sets - 1 reps - 30s-60s hold  Side Plank with Clam - 1 x daily - 3-5 x weekly - 1-2 sets - 15-20 reps ( once strong enough and balanced may start)     Therapeutic Exercise:   [x] (93003) Provided verbal/tactile cueing for activities related to strengthening, flexibility, endurance, ROM for improvements in LE, proximal hip, and core control with self-care, mobility, lifting, ambulation. NMR:  [x] (71903) Provided verbal/tactile cueing for activities related to improving balance, coordination, kinesthetic sense, posture, motor skill, proprioception to assist with LE, proximal hip, and core control in self-care, mobility, lifting, ambulation and eccentric single leg control.      Therapeutic Activities:    [] (75868) Provided verbal/tactile cueing for activities related to improving balance, coordination, kinesthetic sense, posture, motor skill, proprioception and motor activation to allow for proper function of core, proximal hip and LE with self-care and ADLs and functional mobility. Gait Training:    [] (87164) Provided training and instruction to the patient for proper LE, core and proximal hip recruitment and positioning and eccentric body weight control with ambulation re-education including up and down stairs     Manual Treatments:  PROM / STM / Oscillations-Mobs:  G-I, II, III, IV (PA's, Inf., Post.)  [] (11175) Provided manual therapy to mobilize LE, proximal hip and/or LS spine soft tissue/joints for the purpose of modulating pain, promoting relaxation,  increasing ROM, reducing/eliminating soft tissue swelling/inflammation/restriction, improving soft tissue extensibility and allowing for proper ROM for normal function with self-care, mobility, lifting and ambulation.      Home Exercise Program:    [x] (44561) Reviewed/Progressed HEP activities related to strengthening, flexibility, endurance, ROM of core, proximal hip and LE for functional self-care, mobility, lifting and ambulation/stair navigation   [x] (11507) Reviewed/Progressed HEP activities related to improving balance, coordination, kinesthetic sense, posture, motor skill, proprioception of core, proximal hip and LE for self-care, mobility, lifting, and ambulation/stair navigation      Modalities:    [] Electric Stimulation:   [] Ultrasound:   [] Other:       Charges:  Timed Code Treatment Minutes: 23 NMR, 15 mins Therapeutic Exercise (TE)   Total Treatment Minutes: 38      [] EVAL (LOW) 51415 (typically 20 minutes face-to-face)  [] EVAL (MOD) 84444 (typically 30 minutes face-to-face)  [] EVAL (HIGH) 02698 (typically 45 minutes face-to-face)  [] RE-EVAL     [x] ZY(15077) x   1    [x] NMR (40975) x   2   [] Manual (69901) x       [] TA (09944) x       [] Gait Training [phone] x       [] ES(attended) (10595)  [] ES (un) ([phone])   [] DRY NEEDLE 1 OR 2 MUSCLES  [] DRY NEEDLE 3+ MUSCLES  [] Mech Traction (47800)  [] Ultrasound (55023)  [] Other:    GOALS:  Patient stated goal: reduce pain, comfortably be able to sit and stand  []? Progressing: []? Met: []? Not Met: []? Adjusted  Therapist goals for Patient:   Short Term Goals: To be achieved in: 2 weeks  1. Independent in HEP and progression per patient tolerance, in order to prevent re-injury. []? Progressing: []? Met: []? Not Met: []? Adjusted  2. Patient will have a decrease in pain to resting pain 0/10, worse pain 4/10 facilitate improvement in movement, function, and ADLs as indicated by Functional Deficits. []? Progressing: []? Met: []? Not Met: []? Adjusted     Long Term Goals: To be achieved in: 4 weeks  1. Disability index score of 10% or less on the KRISSY to assist with reaching prior level of function. []? Progressing: []? Met: []? Not Met: []? Adjusted  2. Patient will demonstrate increased AROM to WNL, good LS mobility, good hip ROM to allow for proper joint functioning as indicated by patients Functional Deficits ( hamstring length 90/90 to less than 20 deg from neutral knee ext, hip IR AROM to >/=40deg). []? Progressing: []? Met: []? Not Met: []? Adjusted  3. Patient will demonstrate an increase in Strength to good proximal hip and core activation to allow for proper functional mobility as indicated by patients Functional Deficits. []? Progressing: []? Met: []? Not Met: []? Adjusted      ASSESSMENT:  Requires supervision during HEP and progression more so for form and postural awareness. Good challenge with progressive core strengthening, muscular fatigue achieved. Continues to benefit from skilled PT, verbalizing less complaints since starting PT. Cont with POC.      Treatment/Activity Tolerance:  [x] Patient tolerated treatment well [] Patient limited by fatique  [] Patient limited by pain  [] Patient limited by other medical complications  [] Other:     Overall Progression Towards Functional goals/ Treatment Progress Update:  [] Patient is progressing as expected towards functional goals listed. [] Progression is slowed due to complexities/Impairments listed. [] Progression has been slowed due to co-morbidities. [x] Plan just implemented, too soon to assess goals progression <30days   [] Goals require adjustment due to lack of progress  [] Patient is not progressing as expected and requires additional follow up with physician  [] Other    Prognosis for POC: [x] Good [] Fair  [] Poor    Patient requires continued skilled intervention: [x] Yes  [] No        PLAN:   [x] Continue per plan of care [] Alter current plan (see comments)  [x] Plan of care initiated [] Hold pending MD visit [] Discharge  Frequency/Duration:  1-2 days per week for  4 Weeks:  Interventions:  [x]? Therapeutic exercise including: strength training, ROM, for Lower extremity and core   [x]? NMR activation and proprioception for LE, Glutes and Core. [x]? Manual therapy as indicated for LE, Hip and spine to include: Dry Needling/IASTM, STM, PROM, Gr I-IV mobilizations, manipulation. [x]? Therapeutic activities for LE and core. []? Gait Training including gait normalization and reducing fall risk. [x]? Modalities as needed that may include: thermal agents, E-stim, Biofeedback, US, iontophoresis as indicated  [x]? Mechanical Lumbar traction     Electronically signed by: Lencho Sanchez PT DPT  Note: If patient does not return for scheduled/recommended follow up visits, this note will serve as a discharge from care along with the most recent update on progress.

## 2022-03-31 NOTE — PROGRESS NOTES
Christina Montes (:  1995) is a 33 y.o. male,Established patient, here for evaluation of the following chief complaint(s):  Follow-up      ASSESSMENT/PLAN:  1. Diabetes mellitus type 2, insulin dependent ([address]  Assessment & Plan:  Control is great. Goal is to get off of insulin. Continue metformin. Orders:  -     POCT glycosylated hemoglobin (Hb A1C)  2. Essential hypertension  Assessment & Plan:  BP good on lisinopril 10mg daily. Will stop atenolol to see if makes his fatigue  Better. 3. GERD without esophagitis  Comments:  Doing well on omeprazole. Helping with sxs. Return in about 6 weeks (around 2022) for DM2, HTN. SUBJECTIVE/OBJECTIVE:  HPI    Overall he is feeling well  Had his nasal surgery 2 weeks ago. Healing well. Blood sugars have all been under 150. He is using 20 units of lantus and 2000mg of XR metformin. Still having some fatigue. Doing PT for back. Review of Systems   Constitutional: Positive for fatigue. Negative for chills and fever. Respiratory: Negative for cough, shortness of breath and wheezing. Cardiovascular: Negative for chest pain, palpitations and leg swelling. Gastrointestinal: Negative for abdominal pain, constipation, diarrhea, nausea and vomiting. Musculoskeletal: Positive for back pain. Psychiatric/Behavioral: Positive for dysphoric mood. The patient is not nervous/anxious.         Current Outpatient Medications on File Prior to Visit   Medication Sig Dispense Refill    cetirizine (ZYRTEC) 5 MG tablet Take 5 mg by mouth daily      omeprazole (PRILOSEC) 40 MG delayed release capsule TAKE ONE CAPSULE BY MOUTH EVERY MORNING BEFORE BREAKFAST 30 capsule 1    buPROPion (WELLBUTRIN XL) 300 MG extended release tablet TAKE ONE TABLET BY MOUTH EVERY MORNING 30 tablet 2    ondansetron (ZOFRAN) 4 MG tablet TAKE ONE TABLET BY MOUTH DAILY AS NEEDED FOR NAUSEA OR VOMITING 30 tablet 0    ARIPiprazole (ABILIFY) 5 MG tablet Take 1 tablet by mouth daily 30 tablet 3    insulin glargine (LANTUS SOLOSTAR) 100 UNIT/ML injection pen Inject 20 Units into the skin daily 5 pen 3    escitalopram (LEXAPRO) 20 MG tablet TAKE ONE TABLET BY MOUTH DAILY 30 tablet 5    lisinopril (PRINIVIL;ZESTRIL) 10 MG tablet TAKE ONE TABLET BY MOUTH DAILY 30 tablet 5    BLOOD GLUCOSE MONITOR 1 Device One Touch Verio Flex meter      blood glucose monitor strips 125 strips by Other route Test 4 times a day & as needed for symptoms of irregular blood glucose. Dispense sufficient amount for indicated testing frequency plus additional to accommodate PRN testing needs     verio flex testing strips.  Lancets Micro Thin 33G MISC by Does not apply route One touch Verio Flex      metFORMIN (GLUCOPHAGE-XR) 500 MG extended release tablet Take 4 tablets by mouth daily (with breakfast) (Patient taking differently: Take 2,000 mg by mouth Daily with supper ) 120 tablet 5    Insulin Pen Needle ([employer] PEN NEEDLES) 31G X 6 MM MISC 1 each by Does not apply route 5 times daily May substitute brand based on insurance formulary 100 each 3    atorvastatin (LIPITOR) 80 MG tablet Take 1 tablet by mouth nightly 30 tablet 3    fluticasone (FLONASE) 50 MCG/ACT nasal spray 2 sprays by Each Nostril route daily 1 Bottle 5    aspirin-acetaminophen-caffeine (EXCEDRIN MIGRAINE) 250-250-65 MG per tablet Take 1 tablet by mouth every 6 hours as needed for Headaches 90 tablet 3     No current facility-administered medications on file prior to visit. Vitals:    03/31/22 1430   BP: 122/80   Pulse: 78   Resp: 14   SpO2: 99%     Physical Exam  Constitutional:       General: He is not in acute distress. Appearance: Normal appearance. HENT:      Head: Normocephalic and atraumatic. Right Ear: External ear normal.      Left Ear: External ear normal.      Nose: Nose normal.      Mouth/Throat:      Mouth: Mucous membranes are moist.      Pharynx: Oropharynx is clear.    Eyes:      General: No scleral icterus. Conjunctiva/sclera: Conjunctivae normal.   Cardiovascular:      Rate and Rhythm: Normal rate and regular rhythm. Pulses: Normal pulses. Heart sounds: Normal heart sounds. No murmur heard. No friction rub. No gallop. Pulmonary:      Effort: Pulmonary effort is normal.      Breath sounds: Normal breath sounds. No wheezing, rhonchi or rales. Abdominal:      General: Abdomen is flat. Bowel sounds are normal.      Palpations: Abdomen is soft. Musculoskeletal:         General: Normal range of motion. Cervical back: Normal range of motion and neck supple. Right lower leg: No edema. Left lower leg: No edema. Skin:     General: Skin is warm and dry. Findings: No rash. Neurological:      General: No focal deficit present. Mental Status: He is alert. Mental status is at baseline. Coordination: Coordination normal.      Gait: Gait normal.   Psychiatric:         Mood and Affect: Mood normal.         Behavior: Behavior normal.           On this date 3/31/2022 I have spent 30 minutes reviewing previous notes, test results and face to face with the patient discussing the diagnosis and importance of compliance with the treatment plan as well as documenting on the day of the visit. An electronic signature was used to authenticate this note.     --Salvador Singh MD

## 2022-04-04 ENCOUNTER — HOSPITAL ENCOUNTER (OUTPATIENT)
Dept: PHYSICAL THERAPY | Age: 27
Setting detail: THERAPIES SERIES
Discharge: HOME | End: 2022-04-04
Payer: MEDICAID

## 2022-04-04 PROCEDURE — 97112 NEUROMUSCULAR REEDUCATION: CPT

## 2022-04-04 PROCEDURE — 97110 THERAPEUTIC EXERCISES: CPT

## 2022-04-04 NOTE — FLOWSHEET NOTE
[facility]  [address], [address], [address]  Phone: [phone]   Fax: [phone]    Physical Therapy Treatment Note/ Progress Report:     Date:  2022    Patient Name:  Ryan Flores    :  1995  MRN: [id number]    Medical/Treatment Diagnosis Information:  · Diagnosis: M54.9 G89.29 Mid Back pain, chronic  · Treatment Diagnosis: M54.9 G89.29 Mid Back pain, chronic  Insurance/Certification information:  PT Insurance Information: [facility]  Physician Information:  Referring Practitioner: Dr Yousuf Parr of care signed:    [] Yes  [x] No    Date of Patient follow up with Physician: [facility]       Progress Report: []  Yes  [x]  No     Date Range for reporting period:  Beginning:  Eval date on 3/21/2022  Ending:      Progress report due (10 Rx/or 30 days whichever is less): 3592     Recertification due (POC duration/ or 90 days whichever is less):     Visit #  5-8 Insurance Allowable 30/yr Auth Needed NO     RESTRICTIONS/PRECAUTIONS: DM HTN hyperlipidemia uncontrolled medical conditions   Latex Allergy:  [x]NO      []YES  Preferred Language for Healthcare:   [x]English       []other:  Functional Scale: KRISSY, score 17/50, 34% disability Date assessed:3/21/2022    Pain level: 1/10 upon arrival central, worse in the past week 5/10     SUBJECTIVE: Pt reports inc pain 7-8/10   OBJECTIVE:    Observation:    Test measurements:      Exercises/Interventions: Exercises in bold performed in department today. Items not bolded are carried forward from prior visits for continuity of the record.     Exercise/Equipment Resistance/Repetitions HEP Other comments   Therapeutic ex review of HEP and progression    Seated hamstring stretch edge of mat           30s x 3 alt ble [x] PT provided recall on exs in HEP, but able to demo following instruction, intermittent cues for pelvic and lumbar spine neutral positions and awareness    Supine hamstring stretch  30s x 3 reps alt ble [x] Using belt for comfort     LE rotation hooklying    30s x 3 right and left rj  [x]    Supine straight leg lumbar rotation stretch/IT band hip rotation stretch       30s x 3 alt rj  [x]      []    Prone bent knee fall out for hip IR stretch  30s x 3 alt rj  [x]    SKTC  30s x 3 rj  [x]      []    NMR: postural training/education for pelvic positioning and influences on spine and LEs   And to improve marjan and pelvic/hip and spine neutral in supine SL sitting and standing  PT demo'd and handouts given for inc understanding and compliance    PT assisted with using pillows to set up for positioning in bed. Also used  bucket of water analogy for finding pelvic neutral  [x]     Hip flexion isometrics hooklying with TA  10s x 10 reps [x] Progression core LE strengthening   Added 3/28/2022    Dead Bug  10s x 15rep  Added to HEP 3/28/2022    PPT with bridges and TA  10s x 10 reps 1 set [x]     Incline plank at counter  60s [] Too easy    Incline at lowered mat for challenge  30s x 2 sets [x] Required postural cues for not arching spine, form cues, provided by PT using demo and tactical cues    Side plank [] Unable to do due to weakness    Modified knee bent SL  plank rj   30s  X 2 ea [x] Postural cues and form requires provided by PT demo and tactical cues    Modified on knee plank with hip abduction rj   15 reps [x] unable to hold and do at same time too challenging      []      Home Exercise Program:  HEP instruction:   Access Code: [email or web address]  URL: [email or web address]  Date: 03/21/2022  Prepared by: Abe Winchester  Exercises  Hooklying Active Hamstring Stretch - 2 x daily - 7 x weekly - 1 sets - 3 reps - 30s hold  Long Sitting Hamstring Stretch - 2 x daily - 7 x weekly - 1 sets - 3 reps - 30s hold  Supine Straight Leg Lumbar Rotation Stretch - 2 x daily - 7 x weekly - 1 sets - 3 reps - 30s hold  Beginner Bridge - 1-2 x daily - 7 x weekly - 2 sets - 10 reps - 10s hold  Prone Bent Leg Fallout - 2 x daily - 7 x weekly - 1 sets - 3 reps - 30s hold    Access Code: [email or web address]  URL: [email or web address]  Date: 03/28/2022  Prepared by: Michael Pike  Exercises  Hooklying Isometric Hip Flexion - 1 x daily - 3-5 x weekly - 1-2 sets - 10 reps - 10s hold  Hooklying Isometric Hip Flexion with Opposite Arm - 1 x daily - 3-5 x weekly - 1-2 sets - 10 reps - 10s hold  Dead Bug - 1 x daily - 3-5 x weekly - 1-2 sets - 15-20 reps  Plank on Counter - 1 x daily - 3-5 x weekly - 1-2 sets - 1 reps - 30s-60s hold  Full Plank - 1 x daily - 3-5 x weekly - 1-2 sets - 1 reps - 30s-60 hold  Side Plank on Elbow - 1 x daily - 7 x weekly - 1-2 sets - 1 reps - 30s-60s hold  Side Plank with Clam - 1 x daily - 3-5 x weekly - 1-2 sets - 15-20 reps ( once strong enough and balanced may start)     Therapeutic Exercise:   [x] (62838) Provided verbal/tactile cueing for activities related to strengthening, flexibility, endurance, ROM for improvements in LE, proximal hip, and core control with self-care, mobility, lifting, ambulation. NMR:  [x] (88518) Provided verbal/tactile cueing for activities related to improving balance, coordination, kinesthetic sense, posture, motor skill, proprioception to assist with LE, proximal hip, and core control in self-care, mobility, lifting, ambulation and eccentric single leg control. Therapeutic Activities:    [] (12254) Provided verbal/tactile cueing for activities related to improving balance, coordination, kinesthetic sense, posture, motor skill, proprioception and motor activation to allow for proper function of core, proximal hip and LE with self-care and ADLs and functional mobility.      Gait Training:    [] (39011) Provided training and instruction to the patient for proper LE, core and proximal hip recruitment and positioning and eccentric body weight control with ambulation re-education including up and down stairs     Manual Treatments:  PROM / STM / Oscillations-Mobs:  G-I, II, III, IV (PA's, Inf., Post.)  [] (30917) Provided manual therapy to mobilize LE, proximal hip and/or LS spine soft tissue/joints for the purpose of modulating pain, promoting relaxation,  increasing ROM, reducing/eliminating soft tissue swelling/inflammation/restriction, improving soft tissue extensibility and allowing for proper ROM for normal function with self-care, mobility, lifting and ambulation. Home Exercise Program:    [x] (52739) Reviewed/Progressed HEP activities related to strengthening, flexibility, endurance, ROM of core, proximal hip and LE for functional self-care, mobility, lifting and ambulation/stair navigation   [x] (58914) Reviewed/Progressed HEP activities related to improving balance, coordination, kinesthetic sense, posture, motor skill, proprioception of core, proximal hip and LE for self-care, mobility, lifting, and ambulation/stair navigation      Modalities:    [] Electric Stimulation:   [] Ultrasound:   [] Other:       Charges:  Timed Code Treatment Minutes: 25 NMR, 15 mins Therapeutic Exercise (TE)   Total Treatment Minutes: 40      [] EVAL (LOW) 97248 (typically 20 minutes face-to-face)  [] EVAL (MOD) 48183 (typically 30 minutes face-to-face)  [] EVAL (HIGH) 47957 (typically 45 minutes face-to-face)  [] RE-EVAL     [x] ET(72794) x   1    [x] NMR (16593) x   2   [] Manual (03487) x       [] TA (67889) x       [] Gait Training [phone]) x       [] ES(attended) (80129)  [] ES (un) ([phone])   [] DRY NEEDLE 1 OR 2 MUSCLES  [] DRY NEEDLE 3+ MUSCLES  [] [facility] T[facility] (64663)  [] Ultrasound (65193)  [] Other:    GOALS:  Patient stated goal: reduce pain, comfortably be able to sit and stand  []? Progressing: []? Met: []? Not Met: []? Adjusted  Therapist goals for Patient:   Short Term Goals: To be achieved in: 2 weeks  1. Independent in HEP and progression per patient tolerance, in order to prevent re-injury. []? Progressing: []? Met: []?  Not Met: []? Adjusted  2. Patient will have a decrease in pain to resting pain 0/10, worse pain 4/10 facilitate improvement in movement, function, and ADLs as indicated by Functional Deficits. []? Progressing: []? Met: []? Not Met: []? Adjusted     Long Term Goals: To be achieved in: 4 weeks  1. Disability index score of 10% or less on the KRISSY to assist with reaching prior level of function. []? Progressing: []? Met: []? Not Met: []? Adjusted  2. Patient will demonstrate increased AROM to WNL, good LS mobility, good hip ROM to allow for proper joint functioning as indicated by patients Functional Deficits ( hamstring length 90/90 to less than 20 deg from neutral knee ext, hip IR AROM to >/=40deg). []? Progressing: []? Met: []? Not Met: []? Adjusted  3. Patient will demonstrate an increase in Strength to good proximal hip and core activation to allow for proper functional mobility as indicated by patients Functional Deficits. []? Progressing: []? Met: []? Not Met: []? Adjusted      ASSESSMENT:  Requires supervision during HEP and progression more so for form and postural awareness. Good challenge with progressive core strengthening, muscular fatigue achieved. Continues to benefit from skilled PT, verbalizing less complaints since starting PT. Cont with POC. Treatment/Activity Tolerance:  [x] Patient tolerated treatment well [] Patient limited by fatique  [] Patient limited by pain  [] Patient limited by other medical complications  [] Other:     Overall Progression Towards Functional goals/ Treatment Progress Update:  [] Patient is progressing as expected towards functional goals listed. [] Progression is slowed due to complexities/Impairments listed. [] Progression has been slowed due to co-morbidities.   [x] Plan just implemented, too soon to assess goals progression <30days   [] Goals require adjustment due to lack of progress  [] Patient is not progressing as expected and requires additional follow up with physician  [] Other    Prognosis for POC: [x] Good [] Fair  [] Poor    Patient requires continued skilled intervention: [x] Yes  [] No        PLAN:   [x] Continue per plan of care [] Alter current plan (see comments)  [x] Plan of care initiated [] Hold pending MD visit [] Discharge  Frequency/Duration:  1-2 days per week for  4 Weeks:  Interventions:  [x]? Therapeutic exercise including: strength training, ROM, for Lower extremity and core   [x]? NMR activation and proprioception for LE, Glutes and Core. [x]? Manual therapy as indicated for LE, Hip and spine to include: Dry Needling/IASTM, STM, PROM, Gr I-IV mobilizations, manipulation. [x]? Therapeutic activities for LE and core. []? Gait Training including gait normalization and reducing fall risk. [x]? Modalities as needed that may include: thermal agents, E-stim, Biofeedback, US, iontophoresis as indicated  [x]? Mechanical Lumbar traction     Electronically signed by: Abe Winchester PT DPT  Note: If patient does not return for scheduled/recommended follow up visits, this note will serve as a discharge from care along with the most recent update on progress.

## 2022-04-07 ENCOUNTER — HOSPITAL ENCOUNTER (OUTPATIENT)
Dept: PHYSICAL THERAPY | Age: 27
Setting detail: THERAPIES SERIES
Discharge: HOME | End: 2022-04-07
Payer: MEDICAID

## 2022-04-07 PROCEDURE — 97110 THERAPEUTIC EXERCISES: CPT

## 2022-04-07 PROCEDURE — 97112 NEUROMUSCULAR REEDUCATION: CPT

## 2022-04-07 NOTE — FLOWSHEET NOTE
[facility] Outpatient [facility]  [address] [place] [address], [address], [address]  Phone: [phone]   Fax: [phone]    Physical Therapy Treatment Note/ Progress Report:     Date:  2022    Patient Name:  Christina Huff    :  1995  MRN: [id number]    Medical/Treatment Diagnosis Information:  · Diagnosis: M54.9 G89.29 Mid Back pain, chronic  · Treatment Diagnosis: M54.9 G89.29 Mid Back pain, chronic  Insurance/Certification information:  PT Insurance Information: [facility]  Physician Information:  Referring Practitioner: Dr Pk Christianson of care signed:    [] Yes  [x] No    Date of Patient follow up with Physician: [facility]       Progress Report: []  Yes  [x]  No     Date Range for reporting period:  Beginning:  Eval date on 3/21/2022  Ending:      Progress report due (10 Rx/or 30 days whichever is less):      Recertification due (POC duration/ or 90 days whichever is less):     Visit #  6/4-8 Insurance Allowable 30/yr Auth Needed NO     RESTRICTIONS/PRECAUTIONS: DM HTN hyperlipidemia uncontrolled medical conditions   Latex Allergy:  [x]NO      []YES  Preferred Language for Healthcare:   [x]English       []other:  Functional Scale: KRISSY, score 17/50, 34% disability Date assessed:3/21/2022    Pain level: 2/10 upon arrival, worse pain 4/10 centralized low back pain      SUBJECTIVE:   OBJECTIVE:    Observation:    Test measurements:      Exercises/Interventions: Exercises in bold performed in department today. Items not bolded are carried forward from prior visits for continuity of the record.     Exercise/Equipment Resistance/Repetitions HEP Other comments   Therapeutic ex review of HEP and progression    Seated hamstring stretch edge of mat           30s x 3 alt ble [x] PT provided recall on exs in HEP, but able to demo following instruction, intermittent cues for pelvic and lumbar spine neutral positions and awareness    Supine hamstring stretch  30s x 3 reps alt ble [x] Using belt for comfort     LE rotation hooklying    30s x 3 right and left rj  [x]    Supine straight leg lumbar rotation stretch/IT band hip rotation stretch       30s x 3 alt rj  [x]      []    Prone bent knee fall out for hip IR stretch  30s x 3 alt rj  [x]    SKTC  30s x 3 rj  [x]      []    NMR: postural training/education for pelvic positioning and influences on spine and LEs   And to improve marjan and pelvic/hip and spine neutral in supine SL sitting and standing  PT demo'd and handouts given for inc understanding and compliance    PT assisted with using pillows to set up for positioning in bed. Also used  bucket of water analogy for finding pelvic neutral  [x]     Hip flexion isometrics hooklying with TA  10s x 10 reps [x] Progression core LE strengthening   Added 3/28/2022    Dead Bug  10s x 15rep  Added to HEP 3/28/2022    PPT with bridges and TA  10s x 10 reps  x2 set with glute resistance green mod to heavy theraband    Progression figure four SL bridge 15 reps x 2 sets ea [x]     Incline plank at counter  60s [] Too easy    Incline at lowered mat for challenge      Attempted with hip ext but unable to complete   10 ea BLE alt   [x] Required postural cues for not arching spine, form cues, provided by PT using demo and tactical cues    Side plank  [] Unable to do due to weakness    Modified knee bent SL  plank rj   30s  X 1 [x] Postural cues and form requires provided by PT demo and tactical cues    Modified on knee plank with hip abduction rj   15 reps [x] unable to hold and do at same time too challenging     Clam shell green theraband mod to heavy resistance   rj 15 reps x 2 sets  []      Home Exercise Program:  HEP instruction:   Access Code: [email or web address]  URL: [email or web address]  Date: 03/21/2022  Prepared by: Minor Wittenberg  Exercises  Hooklying Active Hamstring Stretch - 2 x daily - 7 x weekly - 1 sets - 3 reps - 30s hold  Long Sitting Hamstring Stretch - 2 x daily - 7 x weekly - 1 sets - 3 reps - 30s hold  Supine Straight Leg Lumbar Rotation Stretch - 2 x daily - 7 x weekly - 1 sets - 3 reps - 30s hold  Beginner Bridge - 1-2 x daily - 7 x weekly - 2 sets - 10 reps - 10s hold  Prone Bent Leg Fallout - 2 x daily - 7 x weekly - 1 sets - 3 reps - 30s hold    Access Code: [email or web address]  URL: [email or web address]  Date: 03/28/2022  Prepared by: Marlene Ha  Exercises  Hooklying Isometric Hip Flexion - 1 x daily - 3-5 x weekly - 1-2 sets - 10 reps - 10s hold  Hooklying Isometric Hip Flexion with Opposite Arm - 1 x daily - 3-5 x weekly - 1-2 sets - 10 reps - 10s hold  Dead Bug - 1 x daily - 3-5 x weekly - 1-2 sets - 15-20 reps  Plank on Counter - 1 x daily - 3-5 x weekly - 1-2 sets - 1 reps - 30s-60s hold  Full Plank - 1 x daily - 3-5 x weekly - 1-2 sets - 1 reps - 30s-60 hold  Side Plank on Elbow - 1 x daily - 7 x weekly - 1-2 sets - 1 reps - 30s-60s hold  Side Plank with Clam - 1 x daily - 3-5 x weekly - 1-2 sets - 15-20 reps ( once strong enough and balanced may start)   Access Code: [email or web address]  URL: [email or web address]  Date: 04/07/2022  Prepared by: Ludivina Whyte with Hip Abduction and Resistance - Ground Touches - 1 x daily - 3 x weekly - 2 sets - 15-20 reps  Figure 4 Bridge - 1 x daily - 3 x weekly - 2 sets - 15-20 reps  Hip Extension with Resistance Loop - 1 x daily - 3 x weekly - 2 sets - 15-20 reps  Plank with Hip Extension - 1 x daily - 3 x weekly - 2 sets - 10-15 reps   Clamshell with Resistance - 1 x daily - 3 x weekly - 2 sets - 10-15 reps    Therapeutic Exercise:   [x] (07253) Provided verbal/tactile cueing for activities related to strengthening, flexibility, endurance, ROM for improvements in LE, proximal hip, and core control with self-care, mobility, lifting, ambulation.     NMR:  [x] (99853) Provided verbal/tactile cueing for activities related to improving balance, coordination, kinesthetic sense, posture, motor skill, proprioception to assist with LE, proximal hip, and core control in self-care, mobility, lifting, ambulation and eccentric single leg control. Therapeutic Activities:    [] (98309) Provided verbal/tactile cueing for activities related to improving balance, coordination, kinesthetic sense, posture, motor skill, proprioception and motor activation to allow for proper function of core, proximal hip and LE with self-care and ADLs and functional mobility. Gait Training:    [] (16274) Provided training and instruction to the patient for proper LE, core and proximal hip recruitment and positioning and eccentric body weight control with ambulation re-education including up and down stairs     Manual Treatments:  PROM / STM / Oscillations-Mobs:  G-I, II, III, IV (PA's, Inf., Post.)  [] (43711) Provided manual therapy to mobilize LE, proximal hip and/or LS spine soft tissue/joints for the purpose of modulating pain, promoting relaxation,  increasing ROM, reducing/eliminating soft tissue swelling/inflammation/restriction, improving soft tissue extensibility and allowing for proper ROM for normal function with self-care, mobility, lifting and ambulation.      Home Exercise Program:    [x] (68573) Reviewed/Progressed HEP activities related to strengthening, flexibility, endurance, ROM of core, proximal hip and LE for functional self-care, mobility, lifting and ambulation/stair navigation   [x] (86851) Reviewed/Progressed HEP activities related to improving balance, coordination, kinesthetic sense, posture, motor skill, proprioception of core, proximal hip and LE for self-care, mobility, lifting, and ambulation/stair navigation      Modalities:    [] Electric Stimulation:   [] Ultrasound:   [] Other:       Charges:  Timed Code Treatment Minutes: 25 NMR, 15 mins Therapeutic Exercise (TE)   Total Treatment Minutes: 40      [] EVAL (LOW) 28015 (typically 20 minutes face-to-face)  [] EVAL (MOD) 27822 (typically 30 minutes face-to-face)  [] EVAL (HIGH) 13150 (typically 45 minutes face-to-face)  [] RE-EVAL     [x] NL(08873) x   1    [x] NMR (40445) x   2   [] Manual (62449) x       [] TA (71039) x       [] Gait Training (27399) x       [] ES(attended) (81854)  [] ES (un) (09015)   [] DRY NEEDLE 1 OR 2 MUSCLES  [] DRY NEEDLE 3+ MUSCLES  [] Mech Traction (17936)  [] Ultrasound (42295)  [] Other:    GOALS:  Patient stated goal: reduce pain, comfortably be able to sit and stand  []? Progressing: []? Met: []? Not Met: []? Adjusted  Therapist goals for Patient:   Short Term Goals: To be achieved in: 2 weeks  1. Independent in HEP and progression per patient tolerance, in order to prevent re-injury. []? Progressing: []? Met: []? Not Met: []? Adjusted  2. Patient will have a decrease in pain to resting pain 0/10, worse pain 4/10 facilitate improvement in movement, function, and ADLs as indicated by Functional Deficits. []? Progressing: []? Met: []? Not Met: []? Adjusted     Long Term Goals: To be achieved in: 4 weeks  1. Disability index score of 10% or less on the KRISSY to assist with reaching prior level of function. []? Progressing: []? Met: []? Not Met: []? Adjusted  2. Patient will demonstrate increased AROM to WNL, good LS mobility, good hip ROM to allow for proper joint functioning as indicated by patients Functional Deficits ( hamstring length 90/90 to less than 20 deg from neutral knee ext, hip IR AROM to >/=40deg). []? Progressing: []? Met: []? Not Met: []? Adjusted  3. Patient will demonstrate an increase in Strength to good proximal hip and core activation to allow for proper functional mobility as indicated by patients Functional Deficits. []? Progressing: []? Met: []? Not Met: []? Adjusted      ASSESSMENT:  Requires supervision during HEP and progression more so for form and postural awareness. Good challenge with progressive core strengthening, muscular fatigue achieved.  Continues to benefit from skilled PT, verbalizing less complaints since starting PT. Cont with POC. Treatment/Activity Tolerance:  [x] Patient tolerated treatment well [] Patient limited by fatique  [] Patient limited by pain  [] Patient limited by other medical complications  [] Other:     Overall Progression Towards Functional goals/ Treatment Progress Update:  [] Patient is progressing as expected towards functional goals listed. [] Progression is slowed due to complexities/Impairments listed. [] Progression has been slowed due to co-morbidities. [x] Plan just implemented, too soon to assess goals progression <30days   [] Goals require adjustment due to lack of progress  [] Patient is not progressing as expected and requires additional follow up with physician  [] Other    Prognosis for POC: [x] Good [] Fair  [] Poor    Patient requires continued skilled intervention: [x] Yes  [] No        PLAN:   [x] Continue per plan of care [] Alter current plan (see comments)  [x] Plan of care initiated [] Hold pending MD visit [] Discharge  Frequency/Duration:  1-2 days per week for  4 Weeks:  Interventions:  [x]? Therapeutic exercise including: strength training, ROM, for Lower extremity and core   [x]? NMR activation and proprioception for LE, Glutes and Core. [x]? Manual therapy as indicated for LE, Hip and spine to include: Dry Needling/IASTM, STM, PROM, Gr I-IV mobilizations, manipulation. [x]? Therapeutic activities for LE and core. []? Gait Training including gait normalization and reducing fall risk. [x]? Modalities as needed that may include: thermal agents, E-stim, Biofeedback, US, iontophoresis as indicated  [x]? Mechanical Lumbar traction     Electronically signed by: Sasha Espitia PT DPT  Note: If patient does not return for scheduled/recommended follow up visits, this note will serve as a discharge from care along with the most recent update on progress.

## 2022-04-11 ENCOUNTER — HOSPITAL ENCOUNTER (OUTPATIENT)
Dept: PHYSICAL THERAPY | Age: 27
Setting detail: THERAPIES SERIES
Discharge: HOME | End: 2022-04-11
Payer: MEDICAID

## 2022-04-11 PROCEDURE — 97112 NEUROMUSCULAR REEDUCATION: CPT

## 2022-04-11 PROCEDURE — 97110 THERAPEUTIC EXERCISES: CPT

## 2022-04-11 NOTE — PROGRESS NOTES
[facility] Outpatient Therapy  [address], [address], [address]  Phone: [phone]   Fax: [phone]    Physical Therapy Treatment Note/ Progress Report:     Date:  2022    Patient Name:  Timothy Mendez    :  1995  MRN: [id number]    Medical/Treatment Diagnosis Information:  · Diagnosis: M54.9 G89.29 Mid Back pain, chronic  · Treatment Diagnosis: M54.9 G89.29 Mid Back pain, chronic  Insurance/Certification information:  PT Insurance Information: [facility]  Physician Information:  Referring Practitioner: Dr Brian Albert of care signed:    [] Yes  [x] No     Date of Patient follow up with Physician: [facility]       Progress Report: []  Yes  [x]  No     Date Range for reporting period:  Beginning:  Eval date on 3/21/2022  Ending:      Progress report due (10 Rx/or 30 days whichever is less):      Recertification due (POC duration/ or 90 days whichever is less):     Visit #  7/4-8 Insurance Allowable 30/yr Auth Needed   NO     RESTRICTIONS/PRECAUTIONS: DM HTN hyperlipidemia uncontrolled medical conditions   Latex Allergy:  [x]NO      []YES  Preferred Language for Healthcare:   [x]English       []other:  Functional Scale: KRISSY, score 17/50, 34% disability Date assessed:3/21/2022    Pain level: 2/10 upon arrival, worse pain 4/10 centralized low back pain      SUBJECTIVE:  I have noticed improvements. My pain in the morning is not as constant or as intense. My pain is however inconsistent, some days are worse than other. Wishing to trial HEP for the next three weeks, pt will return 2022 if sx progress, will see MD next day on the 10th of May    OBJECTIVE:    Observation:    Test measurements:  LLE 15 deg from full knee ext 90/90, RLE 26 deg. Exercises/Interventions: Exercises in bold performed in department today.   Items not bolded are carried forward from prior visits for continuity of the record. Exercise/Equipment Resistance/Repetitions HEP Other comments   Therapeutic ex review of HEP and progression    Seated hamstring stretch edge of mat           30s x 3 alt ble [x] PT provided recall on exs in HEP, but able to demo following instruction, intermittent cues for pelvic and lumbar spine neutral positions and awareness    Supine hamstring stretch  30s x 3 reps alt ble [x] Using belt for comfort     LE rotation hooklying    30s x 3 right and left rj  [x]    Supine straight leg lumbar rotation stretch/IT band hip rotation stretch       30s x 3 alt rj  [x]      []    Prone bent knee fall out for hip IR stretch  30s x 3 alt rj  [x]    SKTC  30s x 3 rj  [x]      []    NMR: postural training/education for pelvic positioning and influences on spine and LEs   And to improve marjan and pelvic/hip and spine neutral in supine SL sitting and standing  PT demo'd and handouts given for inc understanding and compliance    PT assisted with using pillows to set up for positioning in bed.  Also used  bucket of water analogy for finding pelvic neutral  [x]     Hip flexion isometrics hooklying with TA  10s x 10 reps [x] Progression core LE strengthening   Added 3/28/2022    Dead Bug  10s x 15rep  Added to HEP 3/28/2022    PPT with bridges and TA  10s x 10 reps  x1 set with glute resistance green mod to heavy theraband    Progression figure four SL bridge 15 reps x 2 sets ea [x]     Incline plank at counter  60s [] Too easy    Incline at lowered mat for challenge      Attempted with hip ext but unable to complete   30s x2   [x] Required postural cues for not arching spine, form cues, provided by PT using demo and tactical cues    Side plank attempted [] Unable to do due to weakness    Modified knee bent SL  plank rj   30s  X 1 [x] Postural cues and form requires provided by PT demo and tactical cues    Modified on knee plank with hip abduction rj   15 reps [x] unable to hold and do at same time too challenging Clam shell green theraband mod to heavy resistance   rj 15 reps x 2 sets  []     Standing hip ext   yellow theraband mod resistance   15 reps x 2 sets   Compensates with hip rotators vs true hip extensors, requires cues verbally demonstration and tactical cues with PT assisting throught he movement to complete. Handout given      Home Exercise Program:  HEP instruction:   Access Code: [email or web address]  URL: [email or web address]  Date: 03/21/2022  Prepared by: Bonnie Cardona  Exercises  Hooklying Active Hamstring Stretch - 2 x daily - 7 x weekly - 1 sets - 3 reps - 30s hold  Long Sitting Hamstring Stretch - 2 x daily - 7 x weekly - 1 sets - 3 reps - 30s hold  Supine Straight Leg Lumbar Rotation Stretch - 2 x daily - 7 x weekly - 1 sets - 3 reps - 30s hold  Beginner Bridge - 1-2 x daily - 7 x weekly - 2 sets - 10 reps - 10s hold  Prone Bent Leg Fallout - 2 x daily - 7 x weekly - 1 sets - 3 reps - 30s hold    Access Code: [email or web address]  URL: [email or web address]  Date: 03/28/2022  Prepared by: Bonnie Cardona  Exercises  Hooklying Isometric Hip Flexion - 1 x daily - 3-5 x weekly - 1-2 sets - 10 reps - 10s hold  Hooklying Isometric Hip Flexion with Opposite Arm - 1 x daily - 3-5 x weekly - 1-2 sets - 10 reps - 10s hold  Dead Bug - 1 x daily - 3-5 x weekly - 1-2 sets - 15-20 reps  Plank on Counter - 1 x daily - 3-5 x weekly - 1-2 sets - 1 reps - 30s-60s hold  Full Plank - 1 x daily - 3-5 x weekly - 1-2 sets - 1 reps - 30s-60 hold  Side Plank on Elbow - 1 x daily - 7 x weekly - 1-2 sets - 1 reps - 30s-60s hold  Side Plank with Clam - 1 x daily - 3-5 x weekly - 1-2 sets - 15-20 reps (once strong enough and balanced may start)   Access Code: [email or web address]  URL: FP Complete/  Date: 04/07/2022  Prepared by: Vonna Mate with Hip Abduction and Resistance - Ground Touches - 1 x daily - 3 x weekly - 2 sets - 15-20 reps  Figure 4 Bridge - 1 x daily - 3 x weekly - 2 sets - 15-20 reps  Hip Extension with self-care, mobility, lifting and ambulation/stair navigation   [x] (62253) Reviewed/Progressed HEP activities related to improving balance, coordination, kinesthetic sense, posture, motor skill, proprioception of core, proximal hip and LE for self-care, mobility, lifting, and ambulation/stair navigation      Modalities:    [] Electric Stimulation:   [] Ultrasound:   [] Other:       Charges:  Timed Code Treatment Minutes: 25 NMR, 20 mins Therapeutic Exercise (TE)   Total Treatment Minutes: 45      [] EVAL (LOW) 60152 (typically 20 minutes face-to-face)  [] EVAL (MOD) 59463 (typically 30 minutes face-to-face)  [] EVAL (HIGH) 19109 (typically 45 minutes face-to-face)   [] RE-EVAL     [x] NA(46290) x   1    [x] NMR (29608) x   2   [] Manual (34912) x       [] TA (97049) x       [] Gait Training [phone] x       [] ES(attended) (65568)  [] ES (un) ([phone])   [] DRY NEEDLE 1 OR 2 MUSCLES  [] DRY NEEDLE 3+ MUSCLES  [] Mech Traction (97608)  [] Ultrasound (25314)  [] Other:    GOALS:  Patient stated goal: reduce pain, comfortably be able to sit and stand  [x]? Progressing: []? Met: [x]? Not Met: []? Adjusted  Therapist goals for Patient:   Short Term Goals: To be achieved in: 2 weeks  1. Independent in HEP and progression per patient tolerance, in order to prevent re-injury. [x]? Progressing: [x]? Met: []? Not Met: []? Adjusted  2. Patient will have a decrease in pain to resting pain 0/10, worse pain 4/10 facilitate improvement in movement, function, and ADLs as indicated by Functional Deficits. [x]? Progressing: [x]? Met: []? Not Met: []? Adjusted     Long Term Goals: To be achieved in: 4 weeks  1. Disability index score of 10% or less on the KRISSY to assist with reaching prior level of function. [x]? Progressing: []? Met: [x]? Not Met: reduced from 17 to 10, impairment/disability rating today of 20%   2.  Patient will demonstrate increased AROM to WNL, good LS mobility, good hip ROM to allow for proper joint functioning as indicated by patients Functional Deficits ( hamstring length 90/90 to less than 20 deg from neutral knee ext, hip IR AROM to >/=40deg). [x]? Progressing: [x]? Partially Met, met for lumbar hip and not met for hamstring length on the RLE, progressively and being addressed in stretching HEP  3. Patient will demonstrate an increase in Strength to good proximal hip and core activation to allow for proper functional mobility as indicated by patients Functional Deficits. [x]? Progressing: []? Met: [x]? Not Met: Being addressed in progressive HEP      ASSESSMENT:  Pt seen for centralized mid and low back pain, with insidious onset for 1 yr. Worsened with covid and during school studying sitting prolong times. Pt reports noticeable dec in intensity and is not as constant anymore. Pt pain ranges from 0-4 currently. Pt requires ongoing supervision with HEP for progression, but demo's indep with stretching. Pt requires ongoing PT for progression of core exs. Pt has met 2/2 STG and LTG are progressing but not yet met. Pt will continue to benefit form skilled PT. Pt however wishing to complete HEP indep for the next month. Pt scheduled to return 5/9. See's referring MD on the 10th of may. Cont with POC for progressive of postural stretching and strengthening. Treatment/Activity Tolerance:  [x] Patient tolerated treatment well [] Patient limited by fatique  [] Patient limited by pain  [] Patient limited by other medical complications  [] Other:     Overall Progression Towards Functional goals/ Treatment Progress Update:  [] Patient is progressing as expected towards functional goals listed. [] Progression is slowed due to complexities/Impairments listed. [] Progression has been slowed due to co-morbidities.   [x] Plan just implemented, too soon to assess goals progression <30days   [] Goals require adjustment due to lack of progress  [] Patient is not progressing as expected and requires additional follow up with physician  [] Other    Prognosis for POC: [x] Good [] Fair  [] Poor    Patient requires continued skilled intervention: [x] Yes  [] No        PLAN:   [x] Continue per plan of care [] Alter current plan (see comments)  [x] Plan of care initiated [] Hold pending MD visit [] Discharge  Frequency/Duration:  1-2 days per week for  4 Weeks:  Interventions:  [x]? Therapeutic exercise including: strength training, ROM, for Lower extremity and core   [x]? NMR activation and proprioception for LE, Glutes and Core. [x]? Manual therapy as indicated for LE, Hip and spine to include: Dry Needling/IASTM, STM, PROM, Gr I-IV mobilizations, manipulation. [x]? Therapeutic activities for LE and core. []? Gait Training including gait normalization and reducing fall risk. [x]? Modalities as needed that may include: thermal agents, E-stim, Biofeedback, US, iontophoresis as indicated  [x]? Mechanical Lumbar traction     Electronically signed by: Ju Polanco PT DPT  Note: If patient does not return for scheduled/recommended follow up visits, this note will serve as a discharge from care along with the most recent update on progress.

## 2022-04-13 ENCOUNTER — OFFICE VISIT (OUTPATIENT)
Dept: PSYCHIATRY | Age: 27
End: 2022-04-13
Payer: MEDICAID

## 2022-04-13 DIAGNOSIS — F41.1 GENERALIZED ANXIETY DISORDER WITH PANIC ATTACKS: ICD-10-CM

## 2022-04-13 DIAGNOSIS — F84.0 AUTISM SPECTRUM DISORDER: ICD-10-CM

## 2022-04-13 DIAGNOSIS — F33.0 MILD EPISODE OF RECURRENT MAJOR DEPRESSIVE DISORDER (HCC): Primary | ICD-10-CM

## 2022-04-13 DIAGNOSIS — F41.0 GENERALIZED ANXIETY DISORDER WITH PANIC ATTACKS: ICD-10-CM

## 2022-04-13 DIAGNOSIS — F95.2 TOURETTE SYNDROME: ICD-10-CM

## 2022-04-13 PROCEDURE — G8428 CUR MEDS NOT DOCUMENT: HCPCS | Performed by: STUDENT IN AN ORGANIZED HEALTH CARE EDUCATION/TRAINING PROGRAM

## 2022-04-13 PROCEDURE — G8417 CALC BMI ABV UP PARAM F/U: HCPCS | Performed by: STUDENT IN AN ORGANIZED HEALTH CARE EDUCATION/TRAINING PROGRAM

## 2022-04-13 PROCEDURE — 1036F TOBACCO NON-USER: CPT | Performed by: STUDENT IN AN ORGANIZED HEALTH CARE EDUCATION/TRAINING PROGRAM

## 2022-04-13 PROCEDURE — 99214 OFFICE O/P EST MOD 30 MIN: CPT | Performed by: STUDENT IN AN ORGANIZED HEALTH CARE EDUCATION/TRAINING PROGRAM

## 2022-04-13 RX ORDER — ARIPIPRAZOLE 5 MG/1
5 TABLET ORAL DAILY
Qty: 30 TABLET | Refills: 3 | Status: SHIPPED | OUTPATIENT
Start: 2022-04-13 | End: 2022-08-03 | Stop reason: SDUPTHER

## 2022-04-13 RX ORDER — BUPROPION HYDROCHLORIDE 150 MG/1
TABLET ORAL
Qty: 30 TABLET | Refills: 3 | Status: SHIPPED | OUTPATIENT
Start: 2022-04-13 | End: 2022-08-03 | Stop reason: SDUPTHER

## 2022-04-13 ASSESSMENT — PATIENT HEALTH QUESTIONNAIRE - PHQ9
8. MOVING OR SPEAKING SO SLOWLY THAT OTHER PEOPLE COULD HAVE NOTICED. OR THE OPPOSITE, BEING SO FIGETY OR RESTLESS THAT YOU HAVE BEEN MOVING AROUND A LOT MORE THAN USUAL: 0
SUM OF ALL RESPONSES TO PHQ QUESTIONS 1-9: 19
SUM OF ALL RESPONSES TO PHQ QUESTIONS 1-9: 19
9. THOUGHTS THAT YOU WOULD BE BETTER OFF DEAD, OR OF HURTING YOURSELF: 0
SUM OF ALL RESPONSES TO PHQ QUESTIONS 1-9: 19
SUM OF ALL RESPONSES TO PHQ9 QUESTIONS 1 & 2: 5
4. FEELING TIRED OR HAVING LITTLE ENERGY: 3
6. FEELING BAD ABOUT YOURSELF - OR THAT YOU ARE A FAILURE OR HAVE LET YOURSELF OR YOUR FAMILY DOWN: 2
1. LITTLE INTEREST OR PLEASURE IN DOING THINGS: 3
5. POOR APPETITE OR OVEREATING: 3
SUM OF ALL RESPONSES TO PHQ QUESTIONS 1-9: 19
7. TROUBLE CONCENTRATING ON THINGS, SUCH AS READING THE NEWSPAPER OR WATCHING TELEVISION: 3
3. TROUBLE FALLING OR STAYING ASLEEP: 3
2. FEELING DOWN, DEPRESSED OR HOPELESS: 2

## 2022-04-13 ASSESSMENT — ANXIETY QUESTIONNAIRES
6. BECOMING EASILY ANNOYED OR IRRITABLE: 3
7. FEELING AFRAID AS IF SOMETHING AWFUL MIGHT HAPPEN: 1
5. BEING SO RESTLESS THAT IT IS HARD TO SIT STILL: 0
4. TROUBLE RELAXING: 3
GAD7 TOTAL SCORE: 13
2. NOT BEING ABLE TO STOP OR CONTROL WORRYING: 2
1. FEELING NERVOUS, ANXIOUS, OR ON EDGE: 2
3. WORRYING TOO MUCH ABOUT DIFFERENT THINGS: 2

## 2022-04-13 NOTE — PROGRESS NOTES
PSYCHIATRY PROGRESS NOTE    Macho Forde  1995 04/13/22  Face to Face time: 30 minutes  PCP: Dion Kingston MD    CC:   Chief Complaint   Patient presents with    Anxiety    Depression     Patient is a 78-year-old male with significant past medical history of chronic headache, chronic back pain, hypertension, type 2 diabetes who presents to the psychiatry clinic today for evaluation and management of depression and anxiety. A:  Patient indicated continuing difficulties with both depression and anxiety despite the alterations to the medication regimen as well as his recent surgery. We will attempt to adapt his current medication regimen to better cover his needs. Diagnosis:  Major depressive disorder, recurrent, mild  Generalized anxiety disorder with panic  Social anxiety disorder, severity unspecified  Tourette syndrome  Autism spectrum disorder    P:   1. We will plan to increase the patient's bupropion to 450 mg daily for depression and anxiety. Patient was advised of potential adverse effects of the medication as had been described to him previously. 2.  We will plan to continue the patient on his previous medications of Escitalopram 20 mg daily and aripiprazole 5 mg daily. Medication Monitoring:    - PDMP reviewed: Hydrocodone acetaminophen 5-325 mg 7-day supply written and filled 3/14/2022    Follow-up: 4 weeks    Safety: Pt was counseled on the potential for increased suicidal ideations and advised on potential options for dealing with these including hotlines, calling the office, or going to the nearest emergency room. __________________________________________________________________________    S:   Patient indicated that his depression over the past several weeks has been problematic. He identified that he has got no motivation, that he struggles to even do leisure activities.   Patient identified that he constantly wants to lie in bed and sleep, though he identifies that the back pain that he has disrupts even this. Patient indicated that his appetite is fluctuating and his concentration has been down. He has been unable to progress much on his thesis as a result of the concentration issues. 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 Patient noted that the teaching job does appear to be going okay though they are taxing him to learn different things that he is not as comfortable with such as math. Patient denied any SI/HI/AVH on evaluation. ROS:  Review of Systems     Brief Medical Hx:   Patient Active Problem List   Diagnosis    Allergic rhinitis    Generalized anxiety disorder with panic attacks    Chronic daily headache    Mid back pain, chronic    Essential hypertension    Mucous retention cyst of maxillary sinus    Diabetes mellitus type 2, insulin dependent (HCC)    Mild episode of recurrent major depressive disorder (HCC)    Tourette syndrome    Autism spectrum disorder    Rectal bleeding    Nasal obstruction    Deviated nasal septum    Hypertrophy of inferior nasal turbinate        Brief Psych Hx:  Hosp: Denied  Diagnoses: Bipolar 2 disorder, generalized anxiety disorder  Med trials: lamotrigine,    Outpt: Previously worked with [facility]  NSSI: Denied  Suicide Attempts: Denied    O:  Wt Readings from Last 3 Encounters:   04/13/22 231 lb 6.4 oz (105 kg)   03/31/22 230 lb (104.3 kg)   03/22/22 231 lb (104.8 kg)       Vitals:    04/13/22 1206   BP: 120/82   Pulse: 70   Resp: 13   Weight: 231 lb 6.4 oz (105 kg)       Mental Status Exam:   Appearance:    Appropriately dressed  Motor: No abnormal movements, tics or mannerisms.   Eye Contact: Good  Speech:    Appropriate rate and rhythm  Language:   Appropriate diction  Mood/Affect: \"About the same\"/mildly blunted  Thought Process:   Linear, logical.  [place] at times  Thought Content:    Depressive content predominating, no SI/HI  Hallucinations:   Denied, not seem to be responding to internal stimuli  Associations:   Intact  Attention/Concentration:   Intact  Orientation:    Alert and oriented x4  Memory:   Intact  Fund of Knowledge:    Appropriate for age and education  Insight/Judgement:   Intact/intact      FAREED-7 SCREENING 4/13/2022 3/10/2022   Feeling nervous, anxious, or on edge More than half the days Nearly every day   Not being able to stop or control worrying More than half the days Nearly every day   Worrying too much about different things More than half the days Nearly every day   Trouble relaxing Nearly every day Nearly every day   Being so restless that it is hard to sit still Not at all Several days   Becoming easily annoyed or irritable Nearly every day More than half the days   Feeling afraid as if something awful might happen Several days Not at all   FAREED-7 Total Score 13 15   How difficult have these problems made it for you to do your work, take care of things at home, or get along with other people? - -     PHQ-9 Questionaire 4/13/2022 3/10/2022   Little interest or pleasure in doing things 3 3   Feeling down, depressed, or hopeless 2 3   Trouble falling or staying asleep, or sleeping too much 3 3   Feeling tired or having little energy 3 3   Poor appetite or overeating 3 1   Feeling bad about yourself - or that you are a failure or have let yourself or your family down 2 1   Trouble concentrating on things, such as reading the newspaper or watching television 3 3   Moving or speaking so slowly that other people could have noticed.  Or the opposite - being so fidgety or restless that you have been moving around a lot more than usual 0 0   Thoughts that you would be better off dead, or of hurting yourself in some way 0 0   PHQ-9 Total Score 19 17   If you checked off any problems, how difficult have these problems made it for you to do your work, take care of things at home, or get along with other people?  - -        Labs:     Office Visit on 03/31/2022   Component Date Value Ref Range Status    Hemoglobin A1C 03/31/2022 6.3  % Final         EKG: 10/14/2021 QTc 400        Olman Whaley MD  Psychiatrist

## 2022-04-18 VITALS
HEART RATE: 70 BPM | RESPIRATION RATE: 13 BRPM | SYSTOLIC BLOOD PRESSURE: 120 MMHG | BODY MASS INDEX: 33.2 KG/M2 | DIASTOLIC BLOOD PRESSURE: 82 MMHG | WEIGHT: 231.4 LBS

## 2022-04-21 ENCOUNTER — APPOINTMENT (OUTPATIENT)
Dept: PHYSICAL THERAPY | Age: 27
End: 2022-04-21
Payer: MEDICAID

## 2022-05-08 DIAGNOSIS — E11.9 DIABETES MELLITUS, NEW ONSET (HCC): ICD-10-CM

## 2022-05-09 RX ORDER — BUPROPION HYDROCHLORIDE 300 MG/1
TABLET ORAL
Qty: 30 TABLET | Refills: 2 | Status: SHIPPED | OUTPATIENT
Start: 2022-05-09 | End: 2022-08-03 | Stop reason: SDUPTHER

## 2022-05-09 RX ORDER — METFORMIN HYDROCHLORIDE 500 MG/1
2000 TABLET, EXTENDED RELEASE ORAL
Qty: 120 TABLET | Refills: 5 | Status: SHIPPED | OUTPATIENT
Start: 2022-05-09

## 2022-05-09 RX ORDER — ATENOLOL 25 MG/1
TABLET ORAL
Qty: 30 TABLET | OUTPATIENT
Start: 2022-05-09

## 2022-05-10 ENCOUNTER — OFFICE VISIT (OUTPATIENT)
Dept: INTERNAL MEDICINE CLINIC | Age: 27
End: 2022-05-10
Payer: MEDICAID

## 2022-05-10 VITALS
SYSTOLIC BLOOD PRESSURE: 122 MMHG | DIASTOLIC BLOOD PRESSURE: 88 MMHG | WEIGHT: 230 LBS | BODY MASS INDEX: 33 KG/M2 | HEART RATE: 68 BPM | OXYGEN SATURATION: 100 % | RESPIRATION RATE: 16 BRPM

## 2022-05-10 DIAGNOSIS — I10 ESSENTIAL HYPERTENSION: ICD-10-CM

## 2022-05-10 DIAGNOSIS — E11.9 DIABETES MELLITUS TYPE 2, INSULIN DEPENDENT (HCC): Primary | ICD-10-CM

## 2022-05-10 DIAGNOSIS — F41.0 GENERALIZED ANXIETY DISORDER WITH PANIC ATTACKS: ICD-10-CM

## 2022-05-10 DIAGNOSIS — Z79.4 DIABETES MELLITUS TYPE 2, INSULIN DEPENDENT (HCC): Primary | ICD-10-CM

## 2022-05-10 DIAGNOSIS — E16.2 HYPOGLYCEMIA: ICD-10-CM

## 2022-05-10 DIAGNOSIS — F41.1 GENERALIZED ANXIETY DISORDER WITH PANIC ATTACKS: ICD-10-CM

## 2022-05-10 LAB — HBA1C MFR BLD: 6 %

## 2022-05-10 PROCEDURE — 83036 HEMOGLOBIN GLYCOSYLATED A1C: CPT | Performed by: INTERNAL MEDICINE

## 2022-05-10 PROCEDURE — 3044F HG A1C LEVEL LT 7.0%: CPT | Performed by: INTERNAL MEDICINE

## 2022-05-10 PROCEDURE — G8417 CALC BMI ABV UP PARAM F/U: HCPCS | Performed by: INTERNAL MEDICINE

## 2022-05-10 PROCEDURE — G8427 DOCREV CUR MEDS BY ELIG CLIN: HCPCS | Performed by: INTERNAL MEDICINE

## 2022-05-10 PROCEDURE — 99214 OFFICE O/P EST MOD 30 MIN: CPT | Performed by: INTERNAL MEDICINE

## 2022-05-10 PROCEDURE — 1036F TOBACCO NON-USER: CPT | Performed by: INTERNAL MEDICINE

## 2022-05-10 PROCEDURE — 2022F DILAT RTA XM EVC RTNOPTHY: CPT | Performed by: INTERNAL MEDICINE

## 2022-05-10 ASSESSMENT — ENCOUNTER SYMPTOMS: SHORTNESS OF BREATH: 0

## 2022-05-10 NOTE — ASSESSMENT & PLAN NOTE
Current meds lexapro 20mg daily and wellbutrin XL 450mg daily - also on abilify 5mg daily. He is working with Dr. Naya Adams. Feels fatigue. May be some medication effect but will see how he does with d/c insulin.

## 2022-05-10 NOTE — PROGRESS NOTES
Jose Tinoco (:  1995) is a 33 y.o. male,Established patient, here for evaluation of the following chief complaint(s):  Hypertension and Diabetes      ASSESSMENT/PLAN:  1. Diabetes mellitus type 2, insulin dependent ([address]  Assessment & Plan:  Control is great. Stop insulin. Continue metformin. A1C in 3 months. Orders:  -     POCT glycosylated hemoglobin (Hb A1C)  2. Hypoglycemia  -     POCT glycosylated hemoglobin (Hb A1C)  3. Essential hypertension  Assessment & Plan:  BP good on lisinopril 10mg daily. Will stop atenolol to see if makes his fatigue  Better. 4. Generalized anxiety disorder with panic attacks  Assessment & Plan:  Current meds lexapro 20mg daily and wellbutrin XL 450mg daily - also on abilify 5mg daily. He is working with Dr. Jacki Stacy. Feels fatigue. May be some medication effect but will see how he does with d/c insulin. Return in about 3 months (around 8/10/2022) for DM2, + A1C. SUBJECTIVE/OBJECTIVE:  HPI    Having some fatigue. Biggest barrier for him in terms of function. 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 Blood sugars have been the same since going down to 10 units of lantus. Review of Systems   Constitutional: Positive for fatigue. Negative for chills and fever. Respiratory: Negative for shortness of breath. Cardiovascular: Negative for chest pain. Psychiatric/Behavioral: Negative for dysphoric mood and sleep disturbance. The patient is not nervous/anxious.         Current Outpatient Medications on File Prior to Visit   Medication Sig Dispense Refill    metFORMIN (GLUCOPHAGE-XR) 500 MG extended release tablet Take 4 tablets by mouth Daily with supper 120 tablet 5    buPROPion (WELLBUTRIN XL) 300 MG extended release tablet TAKE ONE TABLET BY MOUTH EVERY MORNING 30 tablet 2    ARIPiprazole (ABILIFY) 5 MG tablet Take 1 tablet by mouth daily 30 tablet 3    buPROPion (WELLBUTRIN XL) 150 MG extended release tablet Take 1 tablet every morning in addition to the 300mg dose for a total of 450mg daily. 30 tablet 3    cetirizine (ZYRTEC) 5 MG tablet Take 5 mg by mouth daily      omeprazole (PRILOSEC) 40 MG delayed release capsule TAKE ONE CAPSULE BY MOUTH EVERY MORNING BEFORE BREAKFAST 30 capsule 1    ondansetron (ZOFRAN) 4 MG tablet TAKE ONE TABLET BY MOUTH DAILY AS NEEDED FOR NAUSEA OR VOMITING 30 tablet 0    escitalopram (LEXAPRO) 20 MG tablet TAKE ONE TABLET BY MOUTH DAILY 30 tablet 5    lisinopril (PRINIVIL;ZESTRIL) 10 MG tablet TAKE ONE TABLET BY MOUTH DAILY 30 tablet 5    BLOOD GLUCOSE MONITOR 1 Device One Touch Verio Flex meter      blood glucose monitor strips 125 strips by Other route Test 4 times a day & as needed for symptoms of irregular blood glucose. Dispense sufficient amount for indicated testing frequency plus additional to accommodate PRN testing needs     verio flex testing strips.  Lancets Micro Thin 33G MISC by Does not apply route One touch Verio Flex      Insulin Pen Needle ([employer] PEN NEEDLES) 31G X 6 MM MISC 1 each by Does not apply route 5 times daily May substitute brand based on insurance formulary 100 each 3    atorvastatin (LIPITOR) 80 MG tablet Take 1 tablet by mouth nightly 30 tablet 3    fluticasone (FLONASE) 50 MCG/ACT nasal spray 2 sprays by Each Nostril route daily 1 Bottle 5    aspirin-acetaminophen-caffeine (EXCEDRIN MIGRAINE) 250-250-65 MG per tablet Take 1 tablet by mouth every 6 hours as needed for Headaches 90 tablet 3     No current facility-administered medications on file prior to visit. Vitals:    05/10/22 1030   BP: 122/88   Pulse: 68   Resp: 16   SpO2: 100%     Physical Exam  Constitutional:       General: He is not in acute distress. Appearance: Normal appearance. HENT:      Head: Normocephalic and atraumatic.       Right Ear: External ear normal.      Left Ear: External ear normal.      Nose: Nose normal. Mouth/Throat:      Mouth: Mucous membranes are moist.      Pharynx: Oropharynx is clear. Eyes:      General: No scleral icterus. Conjunctiva/sclera: Conjunctivae normal.   Cardiovascular:      Rate and Rhythm: Normal rate and regular rhythm. Pulses: Normal pulses. Heart sounds: Normal heart sounds. No murmur heard. No friction rub. No gallop. Pulmonary:      Effort: Pulmonary effort is normal.      Breath sounds: Normal breath sounds. No wheezing, rhonchi or rales. Abdominal:      General: Abdomen is flat. Bowel sounds are normal.      Palpations: Abdomen is soft. Musculoskeletal:         General: Normal range of motion. Cervical back: Normal range of motion and neck supple. Right lower leg: No edema. Left lower leg: No edema. Skin:     General: Skin is warm and dry. Findings: No rash. Neurological:      General: No focal deficit present. Mental Status: He is alert. Mental status is at baseline. Coordination: Coordination normal.      Gait: Gait normal.   Psychiatric:         Mood and Affect: Mood normal.         Behavior: Behavior normal.           On this date 5/10/2022 I have spent 30 minutes reviewing previous notes, test results and face to face with the patient discussing the diagnosis and importance of compliance with the treatment plan as well as documenting on the day of the visit. An electronic signature was used to authenticate this note.     --Renny Thomas MD

## 2022-05-24 ENCOUNTER — OFFICE VISIT (OUTPATIENT)
Dept: ENT CLINIC | Age: 27
End: 2022-05-24

## 2022-05-24 VITALS
BODY MASS INDEX: 32.93 KG/M2 | HEIGHT: 70 IN | HEART RATE: 87 BPM | WEIGHT: 230 LBS | DIASTOLIC BLOOD PRESSURE: 94 MMHG | SYSTOLIC BLOOD PRESSURE: 132 MMHG

## 2022-05-24 DIAGNOSIS — J34.2 DNS (DEVIATED NASAL SEPTUM): Primary | ICD-10-CM

## 2022-05-24 DIAGNOSIS — J34.89 NASAL OBSTRUCTION: ICD-10-CM

## 2022-05-24 DIAGNOSIS — J34.3 NASAL TURBINATE HYPERTROPHY: ICD-10-CM

## 2022-05-24 PROCEDURE — 99024 POSTOP FOLLOW-UP VISIT: CPT | Performed by: OTOLARYNGOLOGY

## 2022-05-24 NOTE — PROGRESS NOTES
[facility]  [address] [address] [facility], [address] [facility] [address], [address] [place] [address] Araceli  P: [phone]  F: [phone]       Patient     Claudia Mackenzie  1995    ChiefComplaint     Chief Complaint   Patient presents with    Follow-up     Patient is here today for he 2 month follow up, he is doing well with no complaints or concerns       History of Present Illness     Claudia Mackenzie is a pleasant 33 y.o. male here for 2-month follow-up for septoplasty and turbinate reduction. Overall doing well. Breathing has improved. No significant issues complaints or concerns. Past Medical History     Past Medical History:   Diagnosis Date    Anxiety     Depression     Diabetes mellitus (HCC)     GERD (gastroesophageal reflux disease)     Hyperlipidemia     Hypertension     Migraines     Seasonal allergies        Past Surgical History     Past Surgical History:   Procedure Laterality Date    COLONOSCOPY      SEPTOPLASTY Bilateral 3/14/2022    BILATERAL INFERIOR TURBINATE REDUCTION AND SEPTOPLASTY performed by Padma Clement DO at [address] History     Family History   Problem Relation Age of Onset    Cancer Mother         melanoma    Hypertension Mother     Diabetes Mother     Other Mother         concerns for hoarding    Hypertension Father     Anxiety Disorder Father     Diabetes Father     Suicide Attempts Father         pills       Social History     Social History     Socioeconomic History    Marital status: Single     Spouse name: Not on file    Number of children: 0    Years of education: 25    Highest education level:  Bachelor's degree (e.g., BA, AB, BS)   Occupational History    Not on file   Tobacco Use    Smoking status: Never Smoker    Smokeless tobacco: Never Used   Vaping Use    Vaping Use: Never used   Substance and Sexual Activity    Alcohol use: Yes     Comment: Occasional, 2-3 x/wk, 1 mixed drink    Drug use: Never    Sexual activity: Not Currently     Partners: Female   Other Topics Concern    Not on file   Social History Narrative    Finishing grad school    Moved to [place] in 2020. Patient currently living by himself with his 2 cats, supporting himself off of savings and the inheritance left by his father. He is currently attempting to reengage and finish his graduate education in [employer] and working on his thesis. During school years, the patient did identify being placed in \"speech impediment classes\" which he believes were actually special education courses. No guns in the home, no [employer], no legal issues at this time     Social Determinants of Health     Financial Resource Strain:     Difficulty of Paying Living Expenses: Not on file   Food Insecurity:     Worried About Running Out of Food in the Last Year: Not on file    Machelle of Food in the Last Year: Not on file   Transportation Needs:     Lack of Transportation (Medical): Not on file    Lack of Transportation (Non-Medical):  Not on file   Physical Activity:     Days of Exercise per Week: Not on file    Minutes of Exercise per Session: Not on file   Stress:     Feeling of Stress : Not on file   Social Connections:     Frequency of Communication with Friends and Family: Not on file    Frequency of Social Gatherings with Friends and Family: Not on file    Attends Jainism Services: Not on file    Active Member of [address] [place] [address] or Organizations: Not on file    Attends Club or Organization Meetings: Not on file    Marital Status: Not on file   Intimate Partner Violence:     Fear of Current or Ex-Partner: Not on file    Emotionally Abused: Not on file    Physically Abused: Not on file    Sexually Abused: Not on file   Housing Stability:     Unable to Pay for Housing in the Last Year: Not on file    Number of [place] in the Last Year: Not on file    Unstable Housing in the Last Year: Not on file       Allergies     Allergies   Allergen Reactions    No Known Allergies Medications     Current Outpatient Medications   Medication Sig Dispense Refill    metFORMIN (GLUCOPHAGE-XR) 500 MG extended release tablet Take 4 tablets by mouth Daily with supper 120 tablet 5    buPROPion (WELLBUTRIN XL) 300 MG extended release tablet TAKE ONE TABLET BY MOUTH EVERY MORNING 30 tablet 2    ARIPiprazole (ABILIFY) 5 MG tablet Take 1 tablet by mouth daily 30 tablet 3    buPROPion (WELLBUTRIN XL) 150 MG extended release tablet Take 1 tablet every morning in addition to the 300mg dose for a total of 450mg daily. 30 tablet 3    cetirizine (ZYRTEC) 5 MG tablet Take 5 mg by mouth daily      omeprazole (PRILOSEC) 40 MG delayed release capsule TAKE ONE CAPSULE BY MOUTH EVERY MORNING BEFORE BREAKFAST 30 capsule 1    ondansetron (ZOFRAN) 4 MG tablet TAKE ONE TABLET BY MOUTH DAILY AS NEEDED FOR NAUSEA OR VOMITING 30 tablet 0    escitalopram (LEXAPRO) 20 MG tablet TAKE ONE TABLET BY MOUTH DAILY 30 tablet 5    lisinopril (PRINIVIL;ZESTRIL) 10 MG tablet TAKE ONE TABLET BY MOUTH DAILY 30 tablet 5    BLOOD GLUCOSE MONITOR 1 Device One Touch Verio Flex meter      blood glucose monitor strips 125 strips by Other route Test 4 times a day & as needed for symptoms of irregular blood glucose. Dispense sufficient amount for indicated testing frequency plus additional to accommodate PRN testing needs     verio flex testing strips.       Lancets Micro Thin 33G MISC by Does not apply route One touch Verio Flex      Insulin Pen Needle ([employer] PEN NEEDLES) 31G X 6 MM MISC 1 each by Does not apply route 5 times daily May substitute brand based on insurance formulary 100 each 3    atorvastatin (LIPITOR) 80 MG tablet Take 1 tablet by mouth nightly 30 tablet 3    fluticasone (FLONASE) 50 MCG/ACT nasal spray 2 sprays by Each Nostril route daily 1 Bottle 5    aspirin-acetaminophen-caffeine (EXCEDRIN MIGRAINE) 250-250-65 MG per tablet Take 1 tablet by mouth every 6 hours as needed for Headaches 90 tablet 3     No current facility-administered medications for this visit. Review of Systems     Review of Systems      PhysicalExam     Vitals:    05/24/22 1300   BP: (!) 132/94   Pulse: 87       Physical Exam  Nasal septum midline. Turbinates normal in caliber. Nasal passages patent. Procedure           Assessment and Plan     1. DNS (deviated nasal septum)  Patient doing well 2-month status post septoplasty and turbinate reduction. No for intervention on my part necessary. If he does have some allergic rhinitis symptoms that flareup are not controlled with over-the-counter medications he should call for follow-up. 2. Nasal turbinate hypertrophy      3. Nasal obstruction        Return if symptoms worsen or fail to improve. Portions of this note were dictated using Dragon.  There may be linguistic errors secondary to the use of this program.

## 2022-05-30 DIAGNOSIS — R11.2 NON-INTRACTABLE VOMITING WITH NAUSEA, UNSPECIFIED VOMITING TYPE: ICD-10-CM

## 2022-05-30 DIAGNOSIS — R10.13 EPIGASTRIC ABDOMINAL PAIN: ICD-10-CM

## 2022-05-31 RX ORDER — OMEPRAZOLE 40 MG/1
CAPSULE, DELAYED RELEASE ORAL
Qty: 30 CAPSULE | Refills: 5 | Status: SHIPPED | OUTPATIENT
Start: 2022-05-31

## 2022-06-03 NOTE — PROGRESS NOTES
PSYCHIATRY PROGRESS NOTE    Macho Forde  1995 06/06/22  Face to Face time: 30 minutes  PCP: Irais Castellanos MD    CC:   Chief Complaint   Patient presents with    Anxiety    Depression     Patient is a 79-year-old male with significant past medical history of chronic headache, chronic back pain, hypertension, type 2 diabetes who presents to the psychiatry clinic today for evaluation and management of depression and anxiety. Veronica Epstein, was evaluated through a synchronous (real-time) audio-video encounter. The patient (or guardian if applicable) is aware that this is a billable service, which includes applicable co-pays. This Virtual Visit was conducted with patient's (and/or legal guardian's) consent. The visit was conducted pursuant to the emergency declaration under the [facility], [address] [place] [address] authority and the [employer] [facility] and [employer] Act. Patient identification was verified, and a caregiver was present when appropriate. The patient was located at Home: [address] [place] [address]  [address]  [address] [address]. Provider was located at [facility] (Appt Dept): [address] [facility],  [address] [facility]. A:  Patient presentation today is indicative of continued difficulties with depression and anxiety that have not been fully addressed by his current medication regimen. We will attempt to continue to address this with further modifications. Diagnosis:  Major depressive disorder, recurrent, mild  Generalized anxiety disorder with panic  Social anxiety disorder, severity unspecified  Tourette syndrome  Autism spectrum disorder    P:   1. We will plan to continue the patient on his current medications of Wellbutrin  mg daily and aripiprazole 5 mg daily for treatment of depression and anxiety. 2.  We will plan to cross titrate the patient's escitalopram out for fluoxetine at this time.   To do this, we will plan to start fluoxetine 20 mg daily for 2 weeks, then increasing to 40 mg daily thereafter. At the same time, the patient will decrease his escitalopram to 10 mg daily for 3 weeks and then ceased taking this medication. Patient was cautioned regarding adverse effects of this new medication including nausea, vomiting, diarrhea, increased suicidal thoughts. He was cautioned about the possibility of serotonin syndrome with a cross titration and advised to seek medical attention for fevers over 102-103, muscle spasticity, or muscle rigidity. Medication Monitoring:    - PDMP reviewed: No current prescriptions    Follow-up: 4 weeks    Safety: Pt was counseled on the potential for increased suicidal ideations and advised on potential options for dealing with these including hotlines, calling the office, or going to the nearest emergency room. __________________________________________________________________________    S:   Patient identified that he has continuing difficulties with significant depression as well as significant fatigue. Patient identified that the fatigue is the worse of this to symptoms that he has. He notes that this is a daily occurrence, is slightly improved by the addition of bupropion to his medication regimen, though incompletely covered by such. He notes that he does receive adequate sleep if not excessive sleep over the course of the evening but does not wake feeling rested. He does identify that this causes him ongoing difficulties with getting things done during the course of the day. This does cause him to wonder if his medications are working, though he does believe that the bupropion is working. Patient denied any SI/HI/AVH on evaluation today. ROS:  Review of Systems   Constitutional: Positive for fatigue. HENT: Negative. Eyes: Negative. Respiratory: Negative. Cardiovascular: Negative. Gastrointestinal: Negative. Endocrine: Negative.     Genitourinary: Negative. Musculoskeletal: Negative. Allergic/Immunologic: Negative. Neurological: Negative. Hematological: Negative. Psychiatric/Behavioral: Positive for decreased concentration and dysphoric mood. The patient is nervous/anxious. Brief Medical Hx:   Patient Active Problem List   Diagnosis    Allergic rhinitis    Generalized anxiety disorder with panic attacks    Chronic daily headache    Mid back pain, chronic    Essential hypertension    Mucous retention cyst of maxillary sinus    Diabetes mellitus type 2, insulin dependent (HCC)    Mild episode of recurrent major depressive disorder (HCC)    Tourette syndrome    Autism spectrum disorder    Rectal bleeding    Nasal obstruction    Deviated nasal septum    Hypertrophy of inferior nasal turbinate        Brief Psych Hx:  Hosp: Denied  Diagnoses: Bipolar 2 disorder, generalized anxiety disorder  Med trials: lamotrigine,    Outpt: Previously worked with [employer]  NSSI: Denied  Suicide Attempts: Denied    O:  Wt Readings from Last 3 Encounters:   05/24/22 230 lb (104.3 kg)   05/10/22 230 lb (104.3 kg)   04/13/22 231 lb 6.4 oz (105 kg)       There were no vitals filed for this visit. This was due to this being a virtual visit    Mental Status Exam:   Appearance:    Appropriately dressed, appearing tired  Motor: No abnormal movements, tics or mannerisms.   Eye Contact: Fair to good  Speech:    Mildly slowed rate that is typical for him  Language:   Appropriate diction  Mood/Affect:  \" Not much is changed\"/blunted affect  Thought Process:   Linear, logical  Thought Content:    Depressive content predominant, no SI/HI  Hallucinations:   Denied, not seem to be responding to internal stimuli  Associations:   Intact  Attention/Concentration:   Intact  Orientation:    Alert and oriented x4  Memory:   Intact  Fund of Knowledge:    Appropriate for age and education  Insight/Judgement:   Intact/intact      FAREED-7 SCREENING 6/6/2022 4/13/2022 Feeling nervous, anxious, or on edge Nearly every day More than half the days   Not being able to stop or control worrying Nearly every day More than half the days   Worrying too much about different things Nearly every day More than half the days   Trouble relaxing More than half the days Nearly every day   Being so restless that it is hard to sit still Several days Not at all   Becoming easily annoyed or irritable Nearly every day Nearly every day   Feeling afraid as if something awful might happen Several days Several days   FAREED-7 Total Score 16 13   How difficult have these problems made it for you to do your work, take care of things at home, or get along with other people? Somewhat difficult -     PHQ-9 Questionaire 6/6/2022 4/13/2022   Little interest or pleasure in doing things 1 3   Feeling down, depressed, or hopeless 2 2   Trouble falling or staying asleep, or sleeping too much 3 3   Feeling tired or having little energy 3 3   Poor appetite or overeating 1 3   Feeling bad about yourself - or that you are a failure or have let yourself or your family down 1 2   Trouble concentrating on things, such as reading the newspaper or watching television 2 3   Moving or speaking so slowly that other people could have noticed. Or the opposite - being so fidgety or restless that you have been moving around a lot more than usual 1 0   Thoughts that you would be better off dead, or of hurting yourself in some way 1 0   PHQ-9 Total Score 15 19   If you checked off any problems, how difficult have these problems made it for you to do your work, take care of things at home, or get along with other people?  1 -        Labs:     Office Visit on 05/10/2022   Component Date Value Ref Range Status    Hemoglobin A1C 05/10/2022 6.0  % Final         EKG: 10/14/2021 QTc 400        Olman Whaley MD  Psychiatrist

## 2022-06-06 ENCOUNTER — TELEMEDICINE (OUTPATIENT)
Dept: PSYCHIATRY | Age: 27
End: 2022-06-06
Payer: MEDICAID

## 2022-06-06 DIAGNOSIS — F95.2 TOURETTE SYNDROME: ICD-10-CM

## 2022-06-06 DIAGNOSIS — R51.9 CHRONIC DAILY HEADACHE: ICD-10-CM

## 2022-06-06 DIAGNOSIS — F40.10 SOCIAL ANXIETY DISORDER: ICD-10-CM

## 2022-06-06 DIAGNOSIS — F41.1 GENERALIZED ANXIETY DISORDER WITH PANIC ATTACKS: ICD-10-CM

## 2022-06-06 DIAGNOSIS — I10 ESSENTIAL HYPERTENSION: ICD-10-CM

## 2022-06-06 DIAGNOSIS — F84.0 AUTISM SPECTRUM DISORDER: ICD-10-CM

## 2022-06-06 DIAGNOSIS — F33.0 MILD EPISODE OF RECURRENT MAJOR DEPRESSIVE DISORDER (HCC): Primary | ICD-10-CM

## 2022-06-06 DIAGNOSIS — F41.0 GENERALIZED ANXIETY DISORDER WITH PANIC ATTACKS: ICD-10-CM

## 2022-06-06 PROCEDURE — 1036F TOBACCO NON-USER: CPT | Performed by: STUDENT IN AN ORGANIZED HEALTH CARE EDUCATION/TRAINING PROGRAM

## 2022-06-06 PROCEDURE — G8427 DOCREV CUR MEDS BY ELIG CLIN: HCPCS | Performed by: STUDENT IN AN ORGANIZED HEALTH CARE EDUCATION/TRAINING PROGRAM

## 2022-06-06 PROCEDURE — G8417 CALC BMI ABV UP PARAM F/U: HCPCS | Performed by: STUDENT IN AN ORGANIZED HEALTH CARE EDUCATION/TRAINING PROGRAM

## 2022-06-06 PROCEDURE — 99214 OFFICE O/P EST MOD 30 MIN: CPT | Performed by: STUDENT IN AN ORGANIZED HEALTH CARE EDUCATION/TRAINING PROGRAM

## 2022-06-06 RX ORDER — FLUOXETINE HYDROCHLORIDE 20 MG/1
CAPSULE ORAL
Qty: 60 CAPSULE | Refills: 2 | Status: SHIPPED | OUTPATIENT
Start: 2022-06-06 | End: 2022-08-03

## 2022-06-06 RX ORDER — LISINOPRIL 10 MG/1
TABLET ORAL
Qty: 30 TABLET | Refills: 5 | Status: SHIPPED | OUTPATIENT
Start: 2022-06-06

## 2022-06-06 ASSESSMENT — ANXIETY QUESTIONNAIRES
1. FEELING NERVOUS, ANXIOUS, OR ON EDGE: 3
3. WORRYING TOO MUCH ABOUT DIFFERENT THINGS: 3
4. TROUBLE RELAXING: 2
7. FEELING AFRAID AS IF SOMETHING AWFUL MIGHT HAPPEN: 1
5. BEING SO RESTLESS THAT IT IS HARD TO SIT STILL: 1
6. BECOMING EASILY ANNOYED OR IRRITABLE: 3
2. NOT BEING ABLE TO STOP OR CONTROL WORRYING: 3
GAD7 TOTAL SCORE: 16
IF YOU CHECKED OFF ANY PROBLEMS ON THIS QUESTIONNAIRE, HOW DIFFICULT HAVE THESE PROBLEMS MADE IT FOR YOU TO DO YOUR WORK, TAKE CARE OF THINGS AT HOME, OR GET ALONG WITH OTHER PEOPLE: SOMEWHAT DIFFICULT

## 2022-06-06 ASSESSMENT — PATIENT HEALTH QUESTIONNAIRE - PHQ9
4. FEELING TIRED OR HAVING LITTLE ENERGY: 3
SUM OF ALL RESPONSES TO PHQ QUESTIONS 1-9: 15
6. FEELING BAD ABOUT YOURSELF - OR THAT YOU ARE A FAILURE OR HAVE LET YOURSELF OR YOUR FAMILY DOWN: 1
3. TROUBLE FALLING OR STAYING ASLEEP: 3
5. POOR APPETITE OR OVEREATING: 1
9. THOUGHTS THAT YOU WOULD BE BETTER OFF DEAD, OR OF HURTING YOURSELF: 1
8. MOVING OR SPEAKING SO SLOWLY THAT OTHER PEOPLE COULD HAVE NOTICED. OR THE OPPOSITE, BEING SO FIGETY OR RESTLESS THAT YOU HAVE BEEN MOVING AROUND A LOT MORE THAN USUAL: 1
1. LITTLE INTEREST OR PLEASURE IN DOING THINGS: 1
SUM OF ALL RESPONSES TO PHQ QUESTIONS 1-9: 14
SUM OF ALL RESPONSES TO PHQ QUESTIONS 1-9: 15
2. FEELING DOWN, DEPRESSED OR HOPELESS: 2
SUM OF ALL RESPONSES TO PHQ QUESTIONS 1-9: 15
SUM OF ALL RESPONSES TO PHQ9 QUESTIONS 1 & 2: 3
7. TROUBLE CONCENTRATING ON THINGS, SUCH AS READING THE NEWSPAPER OR WATCHING TELEVISION: 2
10. IF YOU CHECKED OFF ANY PROBLEMS, HOW DIFFICULT HAVE THESE PROBLEMS MADE IT FOR YOU TO DO YOUR WORK, TAKE CARE OF THINGS AT HOME, OR GET ALONG WITH OTHER PEOPLE: 1

## 2022-06-06 ASSESSMENT — COLUMBIA-SUICIDE SEVERITY RATING SCALE - C-SSRS
6. HAVE YOU EVER DONE ANYTHING, STARTED TO DO ANYTHING, OR PREPARED TO DO ANYTHING TO END YOUR LIFE?: NO
2. HAVE YOU ACTUALLY HAD ANY THOUGHTS OF KILLING YOURSELF?: NO
1. WITHIN THE PAST MONTH, HAVE YOU WISHED YOU WERE DEAD OR WISHED YOU COULD GO TO SLEEP AND NOT WAKE UP?: NO

## 2022-06-06 ASSESSMENT — ENCOUNTER SYMPTOMS
RESPIRATORY NEGATIVE: 1
GASTROINTESTINAL NEGATIVE: 1
ALLERGIC/IMMUNOLOGIC NEGATIVE: 1
EYES NEGATIVE: 1

## 2022-07-11 ENCOUNTER — TELEPHONE (OUTPATIENT)
Dept: INTERNAL MEDICINE CLINIC | Age: 27
End: 2022-07-11

## 2022-08-01 NOTE — PROGRESS NOTES
PSYCHIATRY PROGRESS NOTE    Macho Forde  1995 08/03/22  Face to Face time: 25 minutes  PCP: Amando Vásquez MD    CC:   Chief Complaint   Patient presents with    Follow-up     Patient is a 26-year-old male with significant past medical history of chronic headache, chronic back pain, hypertension, type 2 diabetes who presents to the psychiatry clinic today for evaluation and management of depression and anxiety. Carmina Sung, was evaluated through a synchronous (real-time) audio-video encounter. The patient (or guardian if applicable) is aware that this is a billable service, which includes applicable co-pays. This Virtual Visit was conducted with patient's (and/or legal guardian's) consent. The visit was conducted pursuant to the emergency declaration under the [address] [place] [address], [address] [place] [address] authority and the [employer] and [employer] Act. Patient identification was verified, and a caregiver was present when appropriate. The patient was located at Home: [address] [place] [address] [address]  [address] [facility] [address]. Provider was located at [facility] (Appt Dept): [address] [facility],  [address] [facility]. A:  Patient's presentation today indicative of improvement in depression and anxiety with the medication changeover to fluoxetine from the escitalopram.  We will continue to monitor the patient's progress and adjust as necessary. Diagnosis:  Major depressive disorder, recurrent, mild  Generalized anxiety disorder with panic  Social anxiety disorder, severity unspecified  Tourette syndrome  Autism spectrum disorder    P:   We will plan to maintain the patient on his current medication regimen of fluoxetine 40 mg daily, bupropion  mg daily, and aripiprazole 5 mg daily.     Medication Monitoring:    - PDMP reviewed: No interval change     Follow-up: 8 weeks    Safety: Pt was counseled on the potential for increased suicidal ideations and advised on potential options for dealing with these including hotlines, calling the office, or going to the nearest emergency room. __________________________________________________________________________    S:   Patient reports that things are slightly better on the current medication regimen. He notes that his depression and  anxiety do feel a little bit improved, notes that this has come on in the past couple weeks. Panic symptoms down significantly to the point where he's not recognized having episodes recently. Still having some troubles getting work started in certain areas, however he identified that he's been able to do what he needs to in others. Patient indicated that he's looking for a new job because his current one is too inconsistent for him, states that he's been able to apply to jobs and gotten a couple interviews. 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 He does note that if he ends up getting one of these jobs he would likely end up moving secondary to a combination of wanting to decrease his commute time as well as not liking his current apartment that much. The patient denied any SI/HI/AVH. ROS:  Review of Systems   Constitutional: Negative. HENT: Negative. Eyes: Negative. Respiratory: Negative. Cardiovascular: Negative. Gastrointestinal: Negative. Endocrine: Negative. Genitourinary: Negative. Musculoskeletal: Negative. Skin: Negative. Allergic/Immunologic: Negative. Neurological: Negative. Hematological: Negative. Psychiatric/Behavioral:  Positive for dysphoric mood. The patient is nervous/anxious.        Brief Medical Hx:   Patient Active Problem List   Diagnosis    Allergic rhinitis    Generalized anxiety disorder with panic attacks    Chronic daily headache    Mid back pain, chronic    Essential hypertension    Mucous retention cyst of maxillary sinus Diabetes mellitus type 2, insulin dependent (HCC)    Mild episode of recurrent major depressive disorder (HCC)    Tourette syndrome    Autism spectrum disorder    Rectal bleeding    Nasal obstruction    Deviated nasal septum    Hypertrophy of inferior nasal turbinate    Social anxiety disorder        Brief Psych Hx:  Hosp: Denied  Diagnoses: Bipolar 2 disorder, generalized anxiety disorder  Med trials: lamotrigine,    Outpt: Previously worked with [employer]  NSSI: Denied  Suicide Attempts: Denied    O:  Wt Readings from Last 3 Encounters:   05/24/22 230 lb (104.3 kg)   05/10/22 230 lb (104.3 kg)   04/13/22 231 lb 6.4 oz (105 kg)       There were no vitals filed for this visit. This is secondary to it being a video visit    Mental Status Exam:   Appearance:    Appropriately dressed  Motor: No abnormal movements, tics or mannerisms.   Eye Contact: Fair over video  Speech:    Similar speech pattern as noted previously  Language:   Appropriate diction  Mood/Affect:  \" I feel okay\"/some mild blunting of affect which appears to be baseline  Thought Process:   Linear, logical with some concrete processes noted  Thought Content:    Topic-appropriate, no SI/HI  Hallucinations:   Denied, not seem to be responding to internal stimuli  Associations:   Intact  Attention/Concentration:   Intact  Orientation:    Alert and oriented x4  Memory:   Intact  Fund of Knowledge:    Appropriate for age and education  Insight/Judgement:   Intact/intact      FAREED-7 SCREENING 8/3/2022 6/6/2022   Feeling nervous, anxious, or on edge More than half the days Nearly every day   Not being able to stop or control worrying More than half the days Nearly every day   Worrying too much about different things More than half the days Nearly every day   Trouble relaxing More than half the days More than half the days   Being so restless that it is hard to sit still Several days Several days   Becoming easily annoyed or irritable Several days Nearly every day Feeling afraid as if something awful might happen Several days Several days   FAREED-7 Total Score 11 16   How difficult have these problems made it for you to do your work, take care of things at home, or get along with other people? Somewhat difficult Somewhat difficult     PHQ-9 Questionaire 8/3/2022 6/6/2022   Little interest or pleasure in doing things 2 1   Feeling down, depressed, or hopeless 1 2   Trouble falling or staying asleep, or sleeping too much 1 3   Feeling tired or having little energy 2 3   Poor appetite or overeating 1 1   Feeling bad about yourself - or that you are a failure or have let yourself or your family down 0 1   Trouble concentrating on things, such as reading the newspaper or watching television 2 2   Moving or speaking so slowly that other people could have noticed. Or the opposite - being so fidgety or restless that you have been moving around a lot more than usual 0 1   Thoughts that you would be better off dead, or of hurting yourself in some way 0 1   PHQ-9 Total Score 9 15   If you checked off any problems, how difficult have these problems made it for you to do your work, take care of things at home, or get along with other people?  1 1      Labs:     Office Visit on 05/10/2022   Component Date Value Ref Range Status    Hemoglobin A1C 05/10/2022 6.0  % Final       EKG: 10/14/2021 QTc 400        Levy Gonzalez MD  Psychiatrist

## 2022-08-03 ENCOUNTER — TELEMEDICINE (OUTPATIENT)
Dept: PSYCHIATRY | Age: 27
End: 2022-08-03
Payer: MEDICAID

## 2022-08-03 DIAGNOSIS — F95.2 TOURETTE SYNDROME: ICD-10-CM

## 2022-08-03 DIAGNOSIS — F41.1 GENERALIZED ANXIETY DISORDER WITH PANIC ATTACKS: ICD-10-CM

## 2022-08-03 DIAGNOSIS — F41.0 GENERALIZED ANXIETY DISORDER WITH PANIC ATTACKS: ICD-10-CM

## 2022-08-03 DIAGNOSIS — F84.0 AUTISM SPECTRUM DISORDER: ICD-10-CM

## 2022-08-03 DIAGNOSIS — F40.10 SOCIAL ANXIETY DISORDER: ICD-10-CM

## 2022-08-03 DIAGNOSIS — F33.0 MILD EPISODE OF RECURRENT MAJOR DEPRESSIVE DISORDER (HCC): Primary | ICD-10-CM

## 2022-08-03 PROCEDURE — 1036F TOBACCO NON-USER: CPT | Performed by: STUDENT IN AN ORGANIZED HEALTH CARE EDUCATION/TRAINING PROGRAM

## 2022-08-03 PROCEDURE — 99214 OFFICE O/P EST MOD 30 MIN: CPT | Performed by: STUDENT IN AN ORGANIZED HEALTH CARE EDUCATION/TRAINING PROGRAM

## 2022-08-03 PROCEDURE — G8427 DOCREV CUR MEDS BY ELIG CLIN: HCPCS | Performed by: STUDENT IN AN ORGANIZED HEALTH CARE EDUCATION/TRAINING PROGRAM

## 2022-08-03 PROCEDURE — G8417 CALC BMI ABV UP PARAM F/U: HCPCS | Performed by: STUDENT IN AN ORGANIZED HEALTH CARE EDUCATION/TRAINING PROGRAM

## 2022-08-03 RX ORDER — BUPROPION HYDROCHLORIDE 150 MG/1
TABLET ORAL
Qty: 90 TABLET | Refills: 1 | Status: SHIPPED | OUTPATIENT
Start: 2022-08-03

## 2022-08-03 RX ORDER — FLUOXETINE HYDROCHLORIDE 40 MG/1
CAPSULE ORAL
Qty: 90 CAPSULE | Refills: 1 | Status: SHIPPED | OUTPATIENT
Start: 2022-08-03

## 2022-08-03 RX ORDER — BUPROPION HYDROCHLORIDE 300 MG/1
300 TABLET ORAL EVERY MORNING
Qty: 90 TABLET | Refills: 1 | Status: SHIPPED | OUTPATIENT
Start: 2022-08-03

## 2022-08-03 RX ORDER — ARIPIPRAZOLE 5 MG/1
5 TABLET ORAL DAILY
Qty: 90 TABLET | Refills: 1 | Status: SHIPPED | OUTPATIENT
Start: 2022-08-03

## 2022-08-03 ASSESSMENT — PATIENT HEALTH QUESTIONNAIRE - PHQ9
5. POOR APPETITE OR OVEREATING: 1
2. FEELING DOWN, DEPRESSED OR HOPELESS: 1
1. LITTLE INTEREST OR PLEASURE IN DOING THINGS: 2
10. IF YOU CHECKED OFF ANY PROBLEMS, HOW DIFFICULT HAVE THESE PROBLEMS MADE IT FOR YOU TO DO YOUR WORK, TAKE CARE OF THINGS AT HOME, OR GET ALONG WITH OTHER PEOPLE: 1
SUM OF ALL RESPONSES TO PHQ9 QUESTIONS 1 & 2: 3
8. MOVING OR SPEAKING SO SLOWLY THAT OTHER PEOPLE COULD HAVE NOTICED. OR THE OPPOSITE, BEING SO FIGETY OR RESTLESS THAT YOU HAVE BEEN MOVING AROUND A LOT MORE THAN USUAL: 0
3. TROUBLE FALLING OR STAYING ASLEEP: 1
SUM OF ALL RESPONSES TO PHQ QUESTIONS 1-9: 9
SUM OF ALL RESPONSES TO PHQ QUESTIONS 1-9: 9
9. THOUGHTS THAT YOU WOULD BE BETTER OFF DEAD, OR OF HURTING YOURSELF: 0
6. FEELING BAD ABOUT YOURSELF - OR THAT YOU ARE A FAILURE OR HAVE LET YOURSELF OR YOUR FAMILY DOWN: 0
7. TROUBLE CONCENTRATING ON THINGS, SUCH AS READING THE NEWSPAPER OR WATCHING TELEVISION: 2
4. FEELING TIRED OR HAVING LITTLE ENERGY: 2
SUM OF ALL RESPONSES TO PHQ QUESTIONS 1-9: 9
SUM OF ALL RESPONSES TO PHQ QUESTIONS 1-9: 9

## 2022-08-03 ASSESSMENT — ANXIETY QUESTIONNAIRES
5. BEING SO RESTLESS THAT IT IS HARD TO SIT STILL: 1
6. BECOMING EASILY ANNOYED OR IRRITABLE: 1
IF YOU CHECKED OFF ANY PROBLEMS ON THIS QUESTIONNAIRE, HOW DIFFICULT HAVE THESE PROBLEMS MADE IT FOR YOU TO DO YOUR WORK, TAKE CARE OF THINGS AT HOME, OR GET ALONG WITH OTHER PEOPLE: SOMEWHAT DIFFICULT
3. WORRYING TOO MUCH ABOUT DIFFERENT THINGS: 2
GAD7 TOTAL SCORE: 11
1. FEELING NERVOUS, ANXIOUS, OR ON EDGE: 2
7. FEELING AFRAID AS IF SOMETHING AWFUL MIGHT HAPPEN: 1
2. NOT BEING ABLE TO STOP OR CONTROL WORRYING: 2
4. TROUBLE RELAXING: 2

## 2022-08-03 ASSESSMENT — ENCOUNTER SYMPTOMS
GASTROINTESTINAL NEGATIVE: 1
ALLERGIC/IMMUNOLOGIC NEGATIVE: 1
EYES NEGATIVE: 1
RESPIRATORY NEGATIVE: 1

## 2022-08-11 ENCOUNTER — OFFICE VISIT (OUTPATIENT)
Dept: INTERNAL MEDICINE CLINIC | Age: 27
End: 2022-08-11
Payer: MEDICAID

## 2022-08-11 VITALS
RESPIRATION RATE: 16 BRPM | DIASTOLIC BLOOD PRESSURE: 78 MMHG | WEIGHT: 224 LBS | HEART RATE: 93 BPM | OXYGEN SATURATION: 98 % | BODY MASS INDEX: 32.14 KG/M2 | SYSTOLIC BLOOD PRESSURE: 124 MMHG

## 2022-08-11 DIAGNOSIS — Z79.4 DIABETES MELLITUS TYPE 2, INSULIN DEPENDENT (HCC): Primary | ICD-10-CM

## 2022-08-11 DIAGNOSIS — G89.29 MID BACK PAIN, CHRONIC: ICD-10-CM

## 2022-08-11 DIAGNOSIS — F39 MOOD DISORDER (HCC): ICD-10-CM

## 2022-08-11 DIAGNOSIS — I10 ESSENTIAL HYPERTENSION: ICD-10-CM

## 2022-08-11 DIAGNOSIS — M54.9 MID BACK PAIN, CHRONIC: ICD-10-CM

## 2022-08-11 DIAGNOSIS — E11.9 DIABETES MELLITUS TYPE 2, INSULIN DEPENDENT (HCC): Primary | ICD-10-CM

## 2022-08-11 LAB — HBA1C MFR BLD: 6 %

## 2022-08-11 PROCEDURE — G8427 DOCREV CUR MEDS BY ELIG CLIN: HCPCS | Performed by: INTERNAL MEDICINE

## 2022-08-11 PROCEDURE — 83036 HEMOGLOBIN GLYCOSYLATED A1C: CPT | Performed by: INTERNAL MEDICINE

## 2022-08-11 PROCEDURE — 3044F HG A1C LEVEL LT 7.0%: CPT | Performed by: INTERNAL MEDICINE

## 2022-08-11 PROCEDURE — G8417 CALC BMI ABV UP PARAM F/U: HCPCS | Performed by: INTERNAL MEDICINE

## 2022-08-11 PROCEDURE — 1036F TOBACCO NON-USER: CPT | Performed by: INTERNAL MEDICINE

## 2022-08-11 PROCEDURE — 99214 OFFICE O/P EST MOD 30 MIN: CPT | Performed by: INTERNAL MEDICINE

## 2022-08-11 PROCEDURE — 2022F DILAT RTA XM EVC RTNOPTHY: CPT | Performed by: INTERNAL MEDICINE

## 2022-08-11 ASSESSMENT — ENCOUNTER SYMPTOMS
COUGH: 0
SHORTNESS OF BREATH: 0
NAUSEA: 0
WHEEZING: 0
ABDOMINAL PAIN: 0
DIARRHEA: 0
VOMITING: 0

## 2022-08-11 NOTE — PROGRESS NOTES
Manish Langley (:  1995) is a 33 y.o. male,Established patient, here for evaluation of the following chief complaint(s):  Diabetes      ASSESSMENT/PLAN:  1. Diabetes mellitus type 2, insulin dependent ([facility] [address] [facility]  Assessment & Plan:  Off of insulin. A1C today 6.0%. On metformin. Will decrease to 1 tab per day for better tolerability. Orders:  -     POCT glycosylated hemoglobin (Hb A1C)  2. Essential hypertension  Assessment & Plan:  BP controlled on lisinopril 10mg daily. 3. Mid back pain, chronic  Assessment & Plan:  Not resolved but improved with PT and HEP. Continue that. 4. [facility]  Comments: Following with Dr. Melva Winters. Feels mental health is stable and he is on the right track and able to work full time now. Return in about 3 months (around 2022) for HTN, DM2, + A1C. SUBJECTIVE/OBJECTIVE:  HPI    Metformin has been making him feel sick. Taking 2-4 tabs per day. Now he is working and so cannot afford to be sick. Otherwise he is doing well. He is looking to increase to full-time work which he is excited about. His mental health is stable. Review of Systems   Constitutional:  Negative for chills, fatigue and fever. Respiratory:  Negative for cough, shortness of breath and wheezing. Cardiovascular:  Negative for chest pain, palpitations and leg swelling. Gastrointestinal:  Negative for abdominal pain, diarrhea, nausea and vomiting. Psychiatric/Behavioral:  Negative for dysphoric mood. Current Outpatient Medications on File Prior to Visit   Medication Sig Dispense Refill    buPROPion (WELLBUTRIN XL) 300 MG extended release tablet Take 1 tablet by mouth every morning 90 tablet 1    buPROPion (WELLBUTRIN XL) 150 MG extended release tablet Take 1 tablet every morning in addition to the 300mg dose for a total of 450mg daily. 90 tablet 1    ARIPiprazole (ABILIFY) 5 MG tablet Take 1 tablet by mouth in the morning.  90 tablet 1    FLUoxetine (PROZAC) 40 MG capsule Take 1 capsule daily 90 capsule 1    lisinopril (PRINIVIL;ZESTRIL) 10 MG tablet TAKE ONE TABLET BY MOUTH DAILY 30 tablet 5    omeprazole (PRILOSEC) 40 MG delayed release capsule TAKE ONE CAPSULE BY MOUTH EVERY MORNING BEFORE BREAKFAST 30 capsule 5    metFORMIN (GLUCOPHAGE-XR) 500 MG extended release tablet Take 4 tablets by mouth Daily with supper 120 tablet 5    cetirizine (ZYRTEC) 5 MG tablet Take 5 mg by mouth daily       ondansetron (ZOFRAN) 4 MG tablet TAKE ONE TABLET BY MOUTH DAILY AS NEEDED FOR NAUSEA OR VOMITING 30 tablet 0    BLOOD GLUCOSE MONITOR 1 Device One Touch Verio Flex meter       blood glucose monitor strips 125 strips by Other route Test 4 times a day & as needed for symptoms of irregular blood glucose. Dispense sufficient amount for indicated testing frequency plus additional to accommodate PRN testing needs     verio flex testing strips. Lancets Micro Thin 33G MISC by Does not apply route One touch Verio Flex       Insulin Pen Needle ([employer] PEN NEEDLES) 31G X 6 MM MISC 1 each by Does not apply route 5 times daily May substitute brand based on insurance formulary 100 each 3    atorvastatin (LIPITOR) 80 MG tablet Take 1 tablet by mouth nightly 30 tablet 3    fluticasone (FLONASE) 50 MCG/ACT nasal spray 2 sprays by Each Nostril route daily 1 Bottle 5    aspirin-acetaminophen-caffeine (EXCEDRIN MIGRAINE) 250-250-65 MG per tablet Take 1 tablet by mouth every 6 hours as needed for Headaches 90 tablet 3     No current facility-administered medications on file prior to visit. Vitals:    08/11/22 0900   BP: 124/78   Pulse: 93   Resp: 16   SpO2: 98%     Physical Exam  Constitutional:       General: He is not in acute distress. Appearance: Normal appearance. HENT:      Head: Normocephalic and atraumatic.       Right Ear: External ear normal.      Left Ear: External ear normal.      Nose: Nose normal.      Mouth/Throat:      Mouth: Mucous membranes are moist.      Pharynx: Oropharynx is clear. Eyes:      General: No scleral icterus. Conjunctiva/sclera: Conjunctivae normal.   Cardiovascular:      Rate and Rhythm: Normal rate and regular rhythm. Pulses: Normal pulses. Heart sounds: Normal heart sounds. No murmur heard. No friction rub. No gallop. Pulmonary:      Effort: Pulmonary effort is normal.      Breath sounds: Normal breath sounds. No wheezing, rhonchi or rales. Abdominal:      General: Abdomen is flat. Bowel sounds are normal.      Palpations: Abdomen is soft. Musculoskeletal:         General: Normal range of motion. Cervical back: Normal range of motion and neck supple. Right lower leg: No edema. Left lower leg: No edema. Skin:     General: Skin is warm and dry. Findings: No rash. Neurological:      General: No focal deficit present. Mental Status: He is alert. Mental status is at baseline. Coordination: Coordination normal.      Gait: Gait normal.   Psychiatric:         Mood and Affect: Mood normal.         Behavior: Behavior normal.         On this date 8/11/2022 I have spent 30 minutes reviewing previous notes, test results and face to face with the patient discussing the diagnosis and importance of compliance with the treatment plan as well as documenting on the day of the visit. An electronic signature was used to authenticate this note.     --Mansi Mayberry MD

## 2022-08-11 NOTE — Clinical Note
He is going to lose medicaid 2/2 income. He is trying to figure out best way to go forward with insurance. Currently signed up for a private discount plan ([facility]) but he is wondering how to look at exchange plans prior to actually losing his medicaid.  Is there a financial counselor or  on your team that has some expertise in this area

## 2022-08-11 NOTE — ASSESSMENT & PLAN NOTE
Off of insulin. A1C today 6.0%. On metformin. Will decrease to 1 tab per day for better tolerability.

## 2022-09-10 DIAGNOSIS — F33.0 MILD EPISODE OF RECURRENT MAJOR DEPRESSIVE DISORDER (HCC): ICD-10-CM

## 2022-09-12 RX ORDER — ARIPIPRAZOLE 5 MG/1
TABLET ORAL
Qty: 30 TABLET | OUTPATIENT
Start: 2022-09-12

## 2022-09-12 RX ORDER — BUPROPION HYDROCHLORIDE 300 MG/1
TABLET ORAL
Qty: 30 TABLET | OUTPATIENT
Start: 2022-09-12

## 2022-09-12 RX ORDER — FLUOXETINE HYDROCHLORIDE 20 MG/1
CAPSULE ORAL
Qty: 60 CAPSULE | Refills: 2 | OUTPATIENT
Start: 2022-09-12

## 2022-09-12 NOTE — TELEPHONE ENCOUNTER
Medication:   Requested Prescriptions     Pending Prescriptions Disp Refills    ARIPiprazole (ABILIFY) 5 MG tablet [Pharmacy Med Name: ARIPIPRAZOLE 5 MG TABLET] 30 tablet      Sig: TAKE ONE TABLET BY MOUTH DAILY    buPROPion (WELLBUTRIN XL) 300 MG extended release tablet [Pharmacy Med Name: buPROPion HCL  MG TABLET] 30 tablet      Sig: TAKE ONE TABLET BY MOUTH EVERY MORNING    FLUoxetine (PROZAC) 20 MG capsule [Pharmacy Med Name: FLUoxetine HCL 20 MG CAPSULE] 60 capsule 2     Sig: TAKE ONE CAPSULE BY MOUTH DAILY FOR 14 DAYS THEN TAKE TWO CAPSULES BY MOUTH DAILY      Patient Phone Number: [phone] (home)     Last appt: 8/3/2022   Next appt: Visit date not found    Last OARRS:   RX Monitoring 3/14/2022   Acute Pain Prescriptions Severe pain not adequately treated with lower dose.

## 2022-11-29 NOTE — ASSESSMENT & PLAN NOTE
BP good on lisinopril 10mg daily. Will stop atenolol to see if makes his fatigue  Better. yes yes yes yes

## 2022-12-12 DIAGNOSIS — I10 ESSENTIAL HYPERTENSION: ICD-10-CM

## 2022-12-12 DIAGNOSIS — R51.9 CHRONIC DAILY HEADACHE: ICD-10-CM

## 2022-12-13 DIAGNOSIS — R11.2 NON-INTRACTABLE VOMITING WITH NAUSEA: ICD-10-CM

## 2022-12-13 DIAGNOSIS — R10.13 EPIGASTRIC ABDOMINAL PAIN: ICD-10-CM

## 2022-12-13 RX ORDER — LISINOPRIL 10 MG/1
TABLET ORAL
Qty: 30 TABLET | Refills: 5 | Status: SHIPPED | OUTPATIENT
Start: 2022-12-13

## 2022-12-13 RX ORDER — OMEPRAZOLE 40 MG/1
CAPSULE, DELAYED RELEASE ORAL
Qty: 30 CAPSULE | Refills: 5 | Status: SHIPPED | OUTPATIENT
Start: 2022-12-13

## 2022-12-15 ENCOUNTER — OFFICE VISIT (OUTPATIENT)
Dept: INTERNAL MEDICINE CLINIC | Age: 27
End: 2022-12-15
Payer: MEDICAID

## 2022-12-15 VITALS
HEART RATE: 80 BPM | BODY MASS INDEX: 33.3 KG/M2 | RESPIRATION RATE: 16 BRPM | WEIGHT: 232.6 LBS | DIASTOLIC BLOOD PRESSURE: 86 MMHG | SYSTOLIC BLOOD PRESSURE: 120 MMHG | HEIGHT: 70 IN | OXYGEN SATURATION: 97 %

## 2022-12-15 DIAGNOSIS — Z13.1 SCREENING FOR DIABETES MELLITUS (DM): ICD-10-CM

## 2022-12-15 DIAGNOSIS — Z13.220 ENCOUNTER FOR LIPID SCREENING FOR CARDIOVASCULAR DISEASE: ICD-10-CM

## 2022-12-15 DIAGNOSIS — Z23 NEED FOR VACCINATION: ICD-10-CM

## 2022-12-15 DIAGNOSIS — Z13.6 ENCOUNTER FOR LIPID SCREENING FOR CARDIOVASCULAR DISEASE: ICD-10-CM

## 2022-12-15 DIAGNOSIS — F41.0 GENERALIZED ANXIETY DISORDER WITH PANIC ATTACKS: ICD-10-CM

## 2022-12-15 DIAGNOSIS — F41.1 GENERALIZED ANXIETY DISORDER WITH PANIC ATTACKS: ICD-10-CM

## 2022-12-15 DIAGNOSIS — I10 ESSENTIAL HYPERTENSION: ICD-10-CM

## 2022-12-15 DIAGNOSIS — E11.9 TYPE 2 DIABETES MELLITUS WITHOUT COMPLICATION, WITHOUT LONG-TERM CURRENT USE OF INSULIN (HCC): Primary | ICD-10-CM

## 2022-12-15 LAB
A/G RATIO: 1.6 (ref 1.1–2.2)
ALBUMIN SERPL-MCNC: 4.2 G/DL (ref 3.4–5)
ALP BLD-CCNC: 38 U/L (ref 40–129)
ALT SERPL-CCNC: 70 U/L (ref 10–40)
ANION GAP SERPL CALCULATED.3IONS-SCNC: 11 MMOL/L (ref 3–16)
AST SERPL-CCNC: 32 U/L (ref 15–37)
BASOPHILS ABSOLUTE: 0.1 K/UL (ref 0–0.2)
BASOPHILS RELATIVE PERCENT: 1.1 %
BILIRUB SERPL-MCNC: 0.3 MG/DL (ref 0–1)
BUN BLDV-MCNC: 16 MG/DL (ref 7–20)
CALCIUM SERPL-MCNC: 9.6 MG/DL (ref 8.3–10.6)
CHLORIDE BLD-SCNC: 103 MMOL/L (ref 99–110)
CHOLESTEROL, TOTAL: 173 MG/DL (ref 0–199)
CO2: 27 MMOL/L (ref 21–32)
CREAT SERPL-MCNC: 1.1 MG/DL (ref 0.9–1.3)
CREATININE URINE: 210.5 MG/DL (ref 39–259)
EOSINOPHILS ABSOLUTE: 0.7 K/UL (ref 0–0.6)
EOSINOPHILS RELATIVE PERCENT: 10.4 %
GFR SERPL CREATININE-BSD FRML MDRD: >60 ML/MIN/{1.73_M2}
GLUCOSE BLD-MCNC: 120 MG/DL (ref 70–99)
HCT VFR BLD CALC: 41.6 % (ref 40.5–52.5)
HDLC SERPL-MCNC: 43 MG/DL (ref 40–60)
HEMOGLOBIN: 14.2 G/DL (ref 13.5–17.5)
LDL CHOLESTEROL CALCULATED: 108 MG/DL
LYMPHOCYTES ABSOLUTE: 2.3 K/UL (ref 1–5.1)
LYMPHOCYTES RELATIVE PERCENT: 34.4 %
MCH RBC QN AUTO: 30.8 PG (ref 26–34)
MCHC RBC AUTO-ENTMCNC: 34.1 G/DL (ref 31–36)
MCV RBC AUTO: 90.2 FL (ref 80–100)
MICROALBUMIN UR-MCNC: 1.8 MG/DL
MICROALBUMIN/CREAT UR-RTO: 8.6 MG/G (ref 0–30)
MONOCYTES ABSOLUTE: 0.4 K/UL (ref 0–1.3)
MONOCYTES RELATIVE PERCENT: 6 %
NEUTROPHILS ABSOLUTE: 3.2 K/UL (ref 1.7–7.7)
NEUTROPHILS RELATIVE PERCENT: 48.1 %
PDW BLD-RTO: 12.5 % (ref 12.4–15.4)
PLATELET # BLD: 248 K/UL (ref 135–450)
PMV BLD AUTO: 9.6 FL (ref 5–10.5)
POTASSIUM SERPL-SCNC: 4.1 MMOL/L (ref 3.5–5.1)
RBC # BLD: 4.62 M/UL (ref 4.2–5.9)
SODIUM BLD-SCNC: 141 MMOL/L (ref 136–145)
TOTAL PROTEIN: 6.9 G/DL (ref 6.4–8.2)
TRIGL SERPL-MCNC: 109 MG/DL (ref 0–150)
TSH REFLEX: 1.3 UIU/ML (ref 0.27–4.2)
VLDLC SERPL CALC-MCNC: 22 MG/DL
WBC # BLD: 6.6 K/UL (ref 4–11)

## 2022-12-15 PROCEDURE — 90674 CCIIV4 VAC NO PRSV 0.5 ML IM: CPT | Performed by: INTERNAL MEDICINE

## 2022-12-15 PROCEDURE — 90471 IMMUNIZATION ADMIN: CPT | Performed by: INTERNAL MEDICINE

## 2022-12-15 PROCEDURE — 3078F DIAST BP <80 MM HG: CPT | Performed by: INTERNAL MEDICINE

## 2022-12-15 PROCEDURE — G8417 CALC BMI ABV UP PARAM F/U: HCPCS | Performed by: INTERNAL MEDICINE

## 2022-12-15 PROCEDURE — 2022F DILAT RTA XM EVC RTNOPTHY: CPT | Performed by: INTERNAL MEDICINE

## 2022-12-15 PROCEDURE — 99214 OFFICE O/P EST MOD 30 MIN: CPT | Performed by: INTERNAL MEDICINE

## 2022-12-15 PROCEDURE — 1036F TOBACCO NON-USER: CPT | Performed by: INTERNAL MEDICINE

## 2022-12-15 PROCEDURE — G8427 DOCREV CUR MEDS BY ELIG CLIN: HCPCS | Performed by: INTERNAL MEDICINE

## 2022-12-15 PROCEDURE — G8482 FLU IMMUNIZE ORDER/ADMIN: HCPCS | Performed by: INTERNAL MEDICINE

## 2022-12-15 PROCEDURE — 3074F SYST BP LT 130 MM HG: CPT | Performed by: INTERNAL MEDICINE

## 2022-12-15 PROCEDURE — 3044F HG A1C LEVEL LT 7.0%: CPT | Performed by: INTERNAL MEDICINE

## 2022-12-15 SDOH — ECONOMIC STABILITY: FOOD INSECURITY: WITHIN THE PAST 12 MONTHS, THE FOOD YOU BOUGHT JUST DIDN'T LAST AND YOU DIDN'T HAVE MONEY TO GET MORE.: NEVER TRUE

## 2022-12-15 SDOH — ECONOMIC STABILITY: FOOD INSECURITY: WITHIN THE PAST 12 MONTHS, YOU WORRIED THAT YOUR FOOD WOULD RUN OUT BEFORE YOU GOT MONEY TO BUY MORE.: NEVER TRUE

## 2022-12-15 ASSESSMENT — SOCIAL DETERMINANTS OF HEALTH (SDOH): HOW HARD IS IT FOR YOU TO PAY FOR THE VERY BASICS LIKE FOOD, HOUSING, MEDICAL CARE, AND HEATING?: NOT HARD AT ALL

## 2022-12-15 NOTE — ASSESSMENT & PLAN NOTE
Off of insulin. Currently not taking metformin either. Will check A1C. Would feel most comfortable with him at least on a little bit of metformin.

## 2022-12-15 NOTE — ASSESSMENT & PLAN NOTE
Current meds fluoxetine 40mg daily daily and wellbutrin XL 450mg daily- also on abilify 5mg daily. He is working with Dr. Alisa Esquivel. Feels a bit more jittery than he did before. Not a trend at this time. Monitor. He has follow up with Dr. Alisa Esquivel in 1 month.

## 2022-12-15 NOTE — PROGRESS NOTES
Kendy Christian (:  1995) is a [address] [place] y.o. male,Established patient, here for evaluation of the following chief complaint(s):  Follow-up      ASSESSMENT/PLAN:  1. Type 2 diabetes mellitus without complication, without long-term current use of insulin (HCC)  Assessment & Plan:  Off of insulin. Currently not taking metformin either. Will check A1C. Would feel most comfortable with him at least on a little bit of metformin. Orders:  -     Microalbumin / Creatinine Urine Ratio  -      DIABETES FOOT EXAM  2. Encounter for lipid screening for cardiovascular disease  -     Lipid Panel; Future  3. Screening for diabetes mellitus (DM)  -     Hemoglobin A1C; Future  4. Essential hypertension  Assessment & Plan:  BP controlled on lisinopril 10mg daily. Check BMP. Orders:  -     CBC with Auto Differential; Future  -     Comprehensive Metabolic Panel; Future  -     TSH with Reflex; Future  5. Need for vaccination  -     Influenza, FLUCELVAX, (age 10 mo+), IM, Preservative Free, 0.5 mL  6. Generalized anxiety disorder with panic attacks  Assessment & Plan:  Current meds fluoxetine 40mg daily daily and wellbutrin XL 450mg daily- also on abilify 5mg daily. He is working with Dr. Jose M Landry. Feels a bit more jittery than he did before. Not a trend at this time. Monitor. He has follow up with Dr. Jose M Landry in 1 month. Return in about 4 months (around 4/15/2023). SUBJECTIVE/OBJECTIVE:  HPI    Overall he is feeling well  Feeling a little more jittery but overall okay.      Has had a few headaches    Review of Systems    Current Outpatient Medications on File Prior to Visit   Medication Sig Dispense Refill    lisinopril (PRINIVIL;ZESTRIL) 10 MG tablet TAKE ONE TABLET BY MOUTH DAILY 30 tablet 5    omeprazole (PRILOSEC) 40 MG delayed release capsule TAKE ONE CAPSULE BY MOUTH EVERY MORNING BEFORE BREAKFAST 30 capsule 5    buPROPion (WELLBUTRIN XL) 300 MG extended release tablet Take 1 tablet by mouth every morning 90 tablet 1 buPROPion (WELLBUTRIN XL) 150 MG extended release tablet Take 1 tablet every morning in addition to the 300mg dose for a total of 450mg daily. 90 tablet 1    ARIPiprazole (ABILIFY) 5 MG tablet Take 1 tablet by mouth in the morning. 90 tablet 1    FLUoxetine (PROZAC) 40 MG capsule Take 1 capsule daily 90 capsule 1    cetirizine (ZYRTEC) 5 MG tablet Take 5 mg by mouth daily       ondansetron (ZOFRAN) 4 MG tablet TAKE ONE TABLET BY MOUTH DAILY AS NEEDED FOR NAUSEA OR VOMITING 30 tablet 0    BLOOD GLUCOSE MONITOR 1 Device One Touch Verio Flex meter       blood glucose monitor strips 125 strips by Other route Test 4 times a day & as needed for symptoms of irregular blood glucose. Dispense sufficient amount for indicated testing frequency plus additional to accommodate PRN testing needs     verio flex testing strips. Lancets Micro Thin 33G MISC by Does not apply route One touch Verio Flex       Insulin Pen Needle ([employer] PEN NEEDLES) 31G X 6 MM MISC 1 each by Does not apply route 5 times daily May substitute brand based on insurance formulary 100 each 3    atorvastatin (LIPITOR) 80 MG tablet Take 1 tablet by mouth nightly 30 tablet 3    aspirin-acetaminophen-caffeine (EXCEDRIN MIGRAINE) 250-250-65 MG per tablet Take 1 tablet by mouth every 6 hours as needed for Headaches 90 tablet 3    metFORMIN (GLUCOPHAGE-XR) 500 MG extended release tablet Take 4 tablets by mouth Daily with supper (Patient not taking: Reported on 12/15/2022) 120 tablet 5     No current facility-administered medications on file prior to visit. Vitals:    12/15/22 0929   BP: 120/86   Pulse: 80   Resp: 16   SpO2: 97%     Physical Exam  Constitutional:       General: He is not in acute distress. Appearance: Normal appearance. HENT:      Head: Normocephalic and atraumatic.       Right Ear: External ear normal.      Left Ear: External ear normal.      Nose: Nose normal.      Mouth/Throat:      Mouth: Mucous membranes are moist. Pharynx: Oropharynx is clear. Eyes:      General: No scleral icterus. Conjunctiva/sclera: Conjunctivae normal.   Cardiovascular:      Rate and Rhythm: Normal rate and regular rhythm. Pulses: Normal pulses. Heart sounds: Normal heart sounds. No murmur heard. No friction rub. No gallop. Pulmonary:      Effort: Pulmonary effort is normal.      Breath sounds: Normal breath sounds. No wheezing, rhonchi or rales. Abdominal:      General: Abdomen is flat. Bowel sounds are normal.      Palpations: Abdomen is soft. Musculoskeletal:         General: Normal range of motion. Cervical back: Normal range of motion and neck supple. Right lower leg: No edema. Left lower leg: No edema. Skin:     General: Skin is warm and dry. Findings: No rash. Neurological:      General: No focal deficit present. Mental Status: He is alert. Mental status is at baseline. Coordination: Coordination normal.      Gait: Gait normal.   Psychiatric:         Mood and Affect: Mood normal.         Behavior: Behavior normal.     Visual inspection:  Deformity/amputation: absent  Skin lesions/pre-ulcerative calluses: absent  Edema: right- negative, left- negative    Sensory exam:  Monofilament sensation: normal  (minimum of 5 random plantar locations tested, avoiding callused areas - > 1 area with absence of sensation is + for neuropathy)    Plus at least one of the following:  Pulses: normal,       On this date 12/15/2022 I have spent 30 minutes reviewing previous notes, test results and face to face with the patient discussing the diagnosis and importance of compliance with the treatment plan as well as documenting on the day of the visit. An electronic signature was used to authenticate this note.     --Kt Malin MD

## 2022-12-16 LAB
ESTIMATED AVERAGE GLUCOSE: 114 MG/DL
HBA1C MFR BLD: 5.6 %

## 2023-01-16 NOTE — PROGRESS NOTES
PSYCHIATRY PROGRESS NOTE    Macho Forde  1995 01/18/23  Face to Face time: 30 minutes  PCP: Fabien Mcdonnell MD    CC:   Chief Complaint   Patient presents with    Follow-up    Medication Check     Patient is a 78-year-old male with significant past medical history of chronic headache, chronic back pain, hypertension, type 2 diabetes who presents to the psychiatry clinic today for evaluation and management of depression and anxiety. A:  Patient's presentation today is indicative of continued difficulties with depression and anxiety symptoms that are not fully managed by his current medication regimen. We will provide the patient with ongoing support. Diagnosis:  Major depressive disorder, recurrent, mild  Generalized anxiety disorder with panic  Social anxiety disorder, severity unspecified  Tourette syndrome  Autism spectrum disorder    P:   Continue aripiprazole 5mg daily, bupropion xl 450mg daily  Increase fluoxetine to 60mg daily for depression and anxiety. The patient was cautioned regarding adverse effects of the medication as had been described to him previously. Medication Monitoring:    - PDMP reviewed: No interval change     Follow-up: 2 months    Safety: Pt was counseled on the potential for increased suicidal ideations and advised on potential options for dealing with these including hotlines, calling the office, or going to the nearest emergency room. __________________________________________________________________________    S:   The patient noted that things are not that different than when he was last seen in August.  He indicated that he's still doing the same job, working about 6 days a week and doing the afternoon-evening shift. He notes that this doesn't leave much available time for him to socialize, though not clear that he's really interested in doing that much if he could.     One of the things that he's having prominent difficulties with is his sleep and subsequent fatigue. The patient indicated that he's sleeping for >10 hours a day and still feels very tired. He acknowledged prominent amotivation as well. He's not been feeling up to working on cleaning his apartment as much as he feels that he ultimately should. The patient denied any SI/HI. One change that he relayed was that he is changing topics of his masters thesis. He worked on this with his advisor, with the patient feeling that the previous topic wasn't going to be nearly as feasible. ROS:  Review of Systems   Constitutional:  Positive for fatigue. HENT: Negative. Eyes: Negative. Respiratory: Negative. Cardiovascular: Negative. Gastrointestinal: Negative. Endocrine: Negative. Genitourinary: Negative. Musculoskeletal: Negative. Skin: Negative. Allergic/Immunologic: Negative. Neurological: Negative. Hematological: Negative. Psychiatric/Behavioral:  Positive for behavioral problems and dysphoric mood. The patient is nervous/anxious.        Brief Medical Hx:   Patient Active Problem List   Diagnosis    Allergic rhinitis    Generalized anxiety disorder with panic attacks    Chronic daily headache    Mid back pain, chronic    Essential hypertension    Mucous retention cyst of maxillary sinus    Type 2 diabetes mellitus without complication, without long-term current use of insulin (HCC)    Mild episode of recurrent major depressive disorder (HCC)    Tourette syndrome    Autism spectrum disorder    Rectal bleeding    Nasal obstruction    Deviated nasal septum    Hypertrophy of inferior nasal turbinate    Social anxiety disorder        Brief Psych Hx:  Hosp: Denied  Diagnoses: Bipolar 2 disorder, generalized anxiety disorder  Med trials: lamotrigine,    Outpt: Previously worked with [employer]  NSSI: Denied  Suicide Attempts: Denied    O:  Wt Readings from Last 3 Encounters:   01/18/23 228 lb 3.2 oz (103.5 kg)   12/15/22 232 lb 9.6 oz (105.5 kg)   08/11/22 224 lb (101.6 kg)       Vitals:    01/18/23 1116   BP: 130/88   Pulse: 85   SpO2: 96%   Weight: 228 lb 3.2 oz (103.5 kg)       Mental Status Exam:   Appearance:    Appropriately dressed  Motor: Rocking behaviors noted  Eye Contact: Fair to good  Speech:    Mildly slowed rate with previously seen speech changes  Language:   Appropriate diction  Mood/Affect:  \"Ok\"/ Affective blunting noted  Thought Process:   [place] processes at times intermixed with linear, hyperlogical thoughts  Thought Content:    Some depressive content present, no SI/HI  Hallucinations:   Denied, not seen to be responding to IS  Associations:   Intact  Attention/Concentration:   Intact  Orientation:    Alert and oriented x4  Memory:   Intact  Fund of Knowledge:    Appropriate for age and education  Insight/Judgement:   Intact/intact      PHQ-9 Questionaire 1/18/2023 8/3/2022   Little interest or pleasure in doing things 2 2   Feeling down, depressed, or hopeless 1 1   Trouble falling or staying asleep, or sleeping too much 3 1   Feeling tired or having little energy 3 2   Poor appetite or overeating 2 1   Feeling bad about yourself - or that you are a failure or have let yourself or your family down 1 0   Trouble concentrating on things, such as reading the newspaper or watching television 1 2   Moving or speaking so slowly that other people could have noticed. Or the opposite - being so fidgety or restless that you have been moving around a lot more than usual 2 0   Thoughts that you would be better off dead, or of hurting yourself in some way 0 0   PHQ-9 Total Score 15 9   If you checked off any problems, how difficult have these problems made it for you to do your work, take care of things at home, or get along with other people?  1 1     FAREED-7 SCREENING 1/18/2023 8/3/2022   Feeling nervous, anxious, or on edge Nearly every day More than half the days   Not being able to stop or control worrying More than half the days More than half the days Worrying too much about different things More than half the days More than half the days   Trouble relaxing More than half the days More than half the days   Being so restless that it is hard to sit still More than half the days Several days   Becoming easily annoyed or irritable More than half the days Several days   Feeling afraid as if something awful might happen Several days Several days   FAREED-7 Total Score 14 11   How difficult have these problems made it for you to do your work, take care of things at home, or get along with other people? Somewhat difficult Somewhat difficult        AIMS Scale  Facial and Oral Movements(AIMS)  Muscles of Facial Expression: None, normal  Lips and Perioral Area: None, normal  Jaw: None, normal  Tongue: None, normal  Extremity Movements(AIMS)  Upper (arms, wrists, hands, fingers): None, normal  Lower (legs, knees, ankles, toes): None, normal  Trunk Movements(AIMS)  Neck, shoulders, hips: None, normal  Overall Severity(AIMS)  Severity of abnormal movements (highest score from 1-7): 0  Incapacitation due to abnormal movements: None, normal  Patient's awareness of abnormal movements (rate only patient's report):  No Awareness  Total of items 1-7: 0     Labs:     Orders Only on 12/15/2022   Component Date Value Ref Range Status    TSH 12/15/2022 1.30  0.27 - 4.20 uIU/mL Final    Hemoglobin A1C 12/15/2022 5.6  See comment % Final    Comment: Comment:  Diagnosis of Diabetes: > or = 6.5%  Increased risk of diabetes (Prediabetes): 5.7-6.4%  Glycemic Control: Nonpregnant Adults: <7.0%                    Pregnant: <6.0%        eAG 12/15/2022 114.0  mg/dL Final    WBC 12/15/2022 6.6  4.0 - 11.0 K/uL Final    RBC 12/15/2022 4.62  4.20 - 5.90 M/uL Final    Hemoglobin 12/15/2022 14.2  13.5 - 17.5 g/dL Final    Hematocrit 12/15/2022 41.6  40.5 - 52.5 % Final    MCV 12/15/2022 90.2  80.0 - 100.0 fL Final    MCH 12/15/2022 30.8  26.0 - 34.0 pg Final    MCHC 12/15/2022 34.1  31.0 - 36.0 g/dL Final    RDW 12/15/2022 12.5  12.4 - 15.4 % Final    Platelets 68/69/1410 248  135 - 450 K/uL Final    MPV 12/15/2022 9.6  5.0 - 10.5 fL Final    Neutrophils % 12/15/2022 48.1  % Final    Lymphocytes % 12/15/2022 34.4  % Final    Monocytes % 12/15/2022 6.0  % Final    Eosinophils % 12/15/2022 10.4  % Final    Basophils % 12/15/2022 1.1  % Final    Neutrophils Absolute 12/15/2022 3.2  1.7 - 7.7 K/uL Final    Lymphocytes Absolute 12/15/2022 2.3  1.0 - 5.1 K/uL Final    Monocytes Absolute 12/15/2022 0.4  0.0 - 1.3 K/uL Final    Eosinophils Absolute 12/15/2022 0.7 (A)  0.0 - 0.6 K/uL Final    Basophils Absolute 12/15/2022 0.1  0.0 - 0.2 K/uL Final    Sodium 12/15/2022 141  136 - 145 mmol/L Final    Potassium 12/15/2022 4.1  3.5 - 5.1 mmol/L Final    Chloride 12/15/2022 103  99 - 110 mmol/L Final    CO2 12/15/2022 27  21 - 32 mmol/L Final    Anion Gap 12/15/2022 11  3 - 16 Final    Glucose 12/15/2022 120 (A)  70 - 99 mg/dL Final    BUN 12/15/2022 16  7 - 20 mg/dL Final    Creatinine 12/15/2022 1.1  0.9 - 1.3 mg/dL Final    Est, Glom Filt Rate 12/15/2022 >60  >60 Final    Comment: Pediatric calculator link  [email or web address]  Effective Oct 3, 2022  These results are not intended for use in patients  <25years of age. eGFR results are calculated without  a race factor using the 2021 CKD-EPI equation. Careful  clinical correlation is recommended, particularly when  comparing to results calculated using previous equations. The CKD-EPI equation is less accurate in patients with  extremes of muscle mass, extra-renal metabolism of  creatinine, excessive creatinine ingestion, or following  therapy that affects renal tubular secretion.       Calcium 12/15/2022 9.6  8.3 - 10.6 mg/dL Final    Total Protein 12/15/2022 6.9  6.4 - 8.2 g/dL Final    Albumin 12/15/2022 4.2  3.4 - 5.0 g/dL Final    Albumin/Globulin Ratio 12/15/2022 1.6  1.1 - 2.2 Final    Total Bilirubin 12/15/2022 0.3  0.0 - 1.0 mg/dL Final    Alkaline Phosphatase 12/15/2022 38 (A)  40 - 129 U/L Final    ALT 12/15/2022 70 (A)  10 - 40 U/L Final    AST 12/15/2022 32  15 - 37 U/L Final    Cholesterol, Total 12/15/2022 173  0 - 199 mg/dL Final    Triglycerides 12/15/2022 109  0 - 150 mg/dL Final    HDL 12/15/2022 43  40 - 60 mg/dL Final    Comment: An HDL cholesterol less than 40 mg/dL is low and  constitutes a coronary heart disease risk factor. An HDL cholesterol greater than 60 mg/dL is a  negative risk factor for coronary heart disease.       LDL Calculated 12/15/2022 108 (A)  <100 mg/dL Final    VLDL Cholesterol Calculated 12/15/2022 22  Not Established mg/dL Final       EKG: 10/14/2021 QTc 400        Cyril Rehman MD  Psychiatrist

## 2023-01-18 ENCOUNTER — OFFICE VISIT (OUTPATIENT)
Dept: PSYCHIATRY | Age: 28
End: 2023-01-18
Payer: MEDICAID

## 2023-01-18 VITALS
BODY MASS INDEX: 32.74 KG/M2 | SYSTOLIC BLOOD PRESSURE: 130 MMHG | DIASTOLIC BLOOD PRESSURE: 88 MMHG | OXYGEN SATURATION: 96 % | HEART RATE: 85 BPM | WEIGHT: 228.2 LBS

## 2023-01-18 DIAGNOSIS — F84.0 AUTISM SPECTRUM DISORDER: ICD-10-CM

## 2023-01-18 DIAGNOSIS — F33.0 MILD EPISODE OF RECURRENT MAJOR DEPRESSIVE DISORDER (HCC): ICD-10-CM

## 2023-01-18 DIAGNOSIS — F41.1 GENERALIZED ANXIETY DISORDER WITH PANIC ATTACKS: ICD-10-CM

## 2023-01-18 DIAGNOSIS — F95.2 TOURETTE SYNDROME: Primary | ICD-10-CM

## 2023-01-18 DIAGNOSIS — F40.10 SOCIAL ANXIETY DISORDER: ICD-10-CM

## 2023-01-18 DIAGNOSIS — F41.0 GENERALIZED ANXIETY DISORDER WITH PANIC ATTACKS: ICD-10-CM

## 2023-01-18 PROCEDURE — G8427 DOCREV CUR MEDS BY ELIG CLIN: HCPCS | Performed by: STUDENT IN AN ORGANIZED HEALTH CARE EDUCATION/TRAINING PROGRAM

## 2023-01-18 PROCEDURE — 3075F SYST BP GE 130 - 139MM HG: CPT | Performed by: STUDENT IN AN ORGANIZED HEALTH CARE EDUCATION/TRAINING PROGRAM

## 2023-01-18 PROCEDURE — 1036F TOBACCO NON-USER: CPT | Performed by: STUDENT IN AN ORGANIZED HEALTH CARE EDUCATION/TRAINING PROGRAM

## 2023-01-18 PROCEDURE — 3079F DIAST BP 80-89 MM HG: CPT | Performed by: STUDENT IN AN ORGANIZED HEALTH CARE EDUCATION/TRAINING PROGRAM

## 2023-01-18 PROCEDURE — G8482 FLU IMMUNIZE ORDER/ADMIN: HCPCS | Performed by: STUDENT IN AN ORGANIZED HEALTH CARE EDUCATION/TRAINING PROGRAM

## 2023-01-18 PROCEDURE — 99214 OFFICE O/P EST MOD 30 MIN: CPT | Performed by: STUDENT IN AN ORGANIZED HEALTH CARE EDUCATION/TRAINING PROGRAM

## 2023-01-18 PROCEDURE — G8417 CALC BMI ABV UP PARAM F/U: HCPCS | Performed by: STUDENT IN AN ORGANIZED HEALTH CARE EDUCATION/TRAINING PROGRAM

## 2023-01-18 RX ORDER — BUPROPION HYDROCHLORIDE 300 MG/1
300 TABLET ORAL EVERY MORNING
Qty: 90 TABLET | Refills: 1 | Status: SHIPPED | OUTPATIENT
Start: 2023-01-18

## 2023-01-18 RX ORDER — ARIPIPRAZOLE 5 MG/1
5 TABLET ORAL DAILY
Qty: 90 TABLET | Refills: 1 | Status: SHIPPED | OUTPATIENT
Start: 2023-01-18

## 2023-01-18 RX ORDER — FLUOXETINE HYDROCHLORIDE 40 MG/1
CAPSULE ORAL
Qty: 90 CAPSULE | Refills: 1 | Status: SHIPPED | OUTPATIENT
Start: 2023-01-18

## 2023-01-18 RX ORDER — FLUOXETINE HYDROCHLORIDE 20 MG/1
20 CAPSULE ORAL DAILY
Qty: 30 CAPSULE | Refills: 2 | Status: SHIPPED | OUTPATIENT
Start: 2023-01-18

## 2023-01-18 RX ORDER — BUPROPION HYDROCHLORIDE 150 MG/1
TABLET ORAL
Qty: 90 TABLET | Refills: 1 | Status: SHIPPED | OUTPATIENT
Start: 2023-01-18

## 2023-01-18 ASSESSMENT — ANXIETY QUESTIONNAIRES
3. WORRYING TOO MUCH ABOUT DIFFERENT THINGS: 2
6. BECOMING EASILY ANNOYED OR IRRITABLE: 2
5. BEING SO RESTLESS THAT IT IS HARD TO SIT STILL: 2
1. FEELING NERVOUS, ANXIOUS, OR ON EDGE: 3
7. FEELING AFRAID AS IF SOMETHING AWFUL MIGHT HAPPEN: 1
4. TROUBLE RELAXING: 2
GAD7 TOTAL SCORE: 14
IF YOU CHECKED OFF ANY PROBLEMS ON THIS QUESTIONNAIRE, HOW DIFFICULT HAVE THESE PROBLEMS MADE IT FOR YOU TO DO YOUR WORK, TAKE CARE OF THINGS AT HOME, OR GET ALONG WITH OTHER PEOPLE: SOMEWHAT DIFFICULT
2. NOT BEING ABLE TO STOP OR CONTROL WORRYING: 2

## 2023-01-18 ASSESSMENT — PATIENT HEALTH QUESTIONNAIRE - PHQ9
SUM OF ALL RESPONSES TO PHQ QUESTIONS 1-9: 15
6. FEELING BAD ABOUT YOURSELF - OR THAT YOU ARE A FAILURE OR HAVE LET YOURSELF OR YOUR FAMILY DOWN: 1
SUM OF ALL RESPONSES TO PHQ9 QUESTIONS 1 & 2: 3
SUM OF ALL RESPONSES TO PHQ QUESTIONS 1-9: 15
8. MOVING OR SPEAKING SO SLOWLY THAT OTHER PEOPLE COULD HAVE NOTICED. OR THE OPPOSITE, BEING SO FIGETY OR RESTLESS THAT YOU HAVE BEEN MOVING AROUND A LOT MORE THAN USUAL: 2
SUM OF ALL RESPONSES TO PHQ QUESTIONS 1-9: 15
7. TROUBLE CONCENTRATING ON THINGS, SUCH AS READING THE NEWSPAPER OR WATCHING TELEVISION: 1
10. IF YOU CHECKED OFF ANY PROBLEMS, HOW DIFFICULT HAVE THESE PROBLEMS MADE IT FOR YOU TO DO YOUR WORK, TAKE CARE OF THINGS AT HOME, OR GET ALONG WITH OTHER PEOPLE: 1
2. FEELING DOWN, DEPRESSED OR HOPELESS: 1
4. FEELING TIRED OR HAVING LITTLE ENERGY: 3
5. POOR APPETITE OR OVEREATING: 2
1. LITTLE INTEREST OR PLEASURE IN DOING THINGS: 2
9. THOUGHTS THAT YOU WOULD BE BETTER OFF DEAD, OR OF HURTING YOURSELF: 0
3. TROUBLE FALLING OR STAYING ASLEEP: 3
SUM OF ALL RESPONSES TO PHQ QUESTIONS 1-9: 15

## 2023-01-18 ASSESSMENT — ENCOUNTER SYMPTOMS
GASTROINTESTINAL NEGATIVE: 1
RESPIRATORY NEGATIVE: 1
EYES NEGATIVE: 1
ALLERGIC/IMMUNOLOGIC NEGATIVE: 1

## 2023-04-18 ENCOUNTER — OFFICE VISIT (OUTPATIENT)
Dept: INTERNAL MEDICINE CLINIC | Age: 28
End: 2023-04-18
Payer: MEDICAID

## 2023-04-18 VITALS
SYSTOLIC BLOOD PRESSURE: 130 MMHG | RESPIRATION RATE: 16 BRPM | DIASTOLIC BLOOD PRESSURE: 84 MMHG | WEIGHT: 233 LBS | HEIGHT: 70 IN | TEMPERATURE: 97.9 F | HEART RATE: 62 BPM | OXYGEN SATURATION: 99 % | BODY MASS INDEX: 33.36 KG/M2

## 2023-04-18 DIAGNOSIS — R10.13 DYSPEPSIA: ICD-10-CM

## 2023-04-18 DIAGNOSIS — E11.9 TYPE 2 DIABETES MELLITUS WITHOUT COMPLICATION, WITHOUT LONG-TERM CURRENT USE OF INSULIN (HCC): Primary | ICD-10-CM

## 2023-04-18 DIAGNOSIS — I10 ESSENTIAL HYPERTENSION: ICD-10-CM

## 2023-04-18 LAB — HBA1C MFR BLD: 5.9 %

## 2023-04-18 PROCEDURE — 83036 HEMOGLOBIN GLYCOSYLATED A1C: CPT | Performed by: INTERNAL MEDICINE

## 2023-04-18 PROCEDURE — 3079F DIAST BP 80-89 MM HG: CPT | Performed by: INTERNAL MEDICINE

## 2023-04-18 PROCEDURE — 99214 OFFICE O/P EST MOD 30 MIN: CPT | Performed by: INTERNAL MEDICINE

## 2023-04-18 PROCEDURE — 1036F TOBACCO NON-USER: CPT | Performed by: INTERNAL MEDICINE

## 2023-04-18 PROCEDURE — G8417 CALC BMI ABV UP PARAM F/U: HCPCS | Performed by: INTERNAL MEDICINE

## 2023-04-18 PROCEDURE — G8427 DOCREV CUR MEDS BY ELIG CLIN: HCPCS | Performed by: INTERNAL MEDICINE

## 2023-04-18 PROCEDURE — 3044F HG A1C LEVEL LT 7.0%: CPT | Performed by: INTERNAL MEDICINE

## 2023-04-18 PROCEDURE — 3075F SYST BP GE 130 - 139MM HG: CPT | Performed by: INTERNAL MEDICINE

## 2023-04-18 PROCEDURE — 2022F DILAT RTA XM EVC RTNOPTHY: CPT | Performed by: INTERNAL MEDICINE

## 2023-04-18 RX ORDER — OMEPRAZOLE 40 MG/1
40 CAPSULE, DELAYED RELEASE ORAL
Qty: 30 CAPSULE | Refills: 5 | Status: SHIPPED | OUTPATIENT
Start: 2023-04-18

## 2023-04-18 SDOH — ECONOMIC STABILITY: FOOD INSECURITY: WITHIN THE PAST 12 MONTHS, THE FOOD YOU BOUGHT JUST DIDN'T LAST AND YOU DIDN'T HAVE MONEY TO GET MORE.: NEVER TRUE

## 2023-04-18 SDOH — ECONOMIC STABILITY: INCOME INSECURITY: HOW HARD IS IT FOR YOU TO PAY FOR THE VERY BASICS LIKE FOOD, HOUSING, MEDICAL CARE, AND HEATING?: NOT HARD AT ALL

## 2023-04-18 SDOH — ECONOMIC STABILITY: HOUSING INSECURITY
IN THE LAST 12 MONTHS, WAS THERE A TIME WHEN YOU DID NOT HAVE A STEADY PLACE TO SLEEP OR SLEPT IN A SHELTER (INCLUDING NOW)?: NO

## 2023-04-18 SDOH — ECONOMIC STABILITY: FOOD INSECURITY: WITHIN THE PAST 12 MONTHS, YOU WORRIED THAT YOUR FOOD WOULD RUN OUT BEFORE YOU GOT MONEY TO BUY MORE.: NEVER TRUE

## 2023-04-18 NOTE — PROGRESS NOTES
are normal.      Palpations: Abdomen is soft. Musculoskeletal:         General: Normal range of motion. Cervical back: Normal range of motion and neck supple. Right lower leg: No edema. Left lower leg: No edema. Skin:     General: Skin is warm and dry. Findings: No rash. Neurological:      General: No focal deficit present. Mental Status: He is alert. Mental status is at baseline. Coordination: Coordination normal.      Gait: Gait normal.   Psychiatric:         Mood and Affect: Mood normal.         Behavior: Behavior normal.         On this date 4/18/2023 I have spent 30 minutes reviewing previous notes, test results and face to face with the patient discussing the diagnosis and importance of compliance with the treatment plan as well as documenting on the day of the visit. An electronic signature was used to authenticate this note.     --Neeta Ralph MD

## 2023-04-18 NOTE — ASSESSMENT & PLAN NOTE
Off of insulin. Currently not taking metformin either. Hemoglobin A1C   Date Value Ref Range Status   04/18/2023 5.9 % Final     He may want to take 500mg of metformin daily.  Recommend eye exam.

## 2023-04-21 ENCOUNTER — CARE COORDINATION (OUTPATIENT)
Dept: CARE COORDINATION | Age: 28
End: 2023-04-21

## 2023-04-21 NOTE — CARE COORDINATION
This ACM covering for Eleonora, the ACM nurse for Dr. Luis Manuel Goss office.   Noted referral to [facility] regarding Medicaid questions  This ACM contacted patient and introduced role of ACM/Care Coordination  Provide patient with web site and contact numbers to f/u with his Medicaid questions  Patient followed along on his computer to the web site to the source of information  Patient denied any additional questions/needs  Provided his ACM's contact information and he will outreach as needed or if he has additional questions  No further outreach scheduled with this ACM; episode of care resolved

## 2023-05-06 DIAGNOSIS — F41.0 GENERALIZED ANXIETY DISORDER WITH PANIC ATTACKS: ICD-10-CM

## 2023-05-06 DIAGNOSIS — F41.1 GENERALIZED ANXIETY DISORDER WITH PANIC ATTACKS: ICD-10-CM

## 2023-05-08 RX ORDER — FLUOXETINE HYDROCHLORIDE 20 MG/1
CAPSULE ORAL
Qty: 90 CAPSULE | Refills: 1 | Status: SHIPPED | OUTPATIENT
Start: 2023-05-08

## 2023-05-22 ENCOUNTER — OFFICE VISIT (OUTPATIENT)
Dept: PSYCHIATRY | Age: 28
End: 2023-05-22
Payer: MEDICAID

## 2023-05-22 VITALS
OXYGEN SATURATION: 94 % | WEIGHT: 234.4 LBS | HEART RATE: 86 BPM | DIASTOLIC BLOOD PRESSURE: 88 MMHG | BODY MASS INDEX: 33.63 KG/M2 | SYSTOLIC BLOOD PRESSURE: 124 MMHG

## 2023-05-22 DIAGNOSIS — G47.19 EXCESSIVE DAYTIME SLEEPINESS: ICD-10-CM

## 2023-05-22 DIAGNOSIS — F40.10 SOCIAL ANXIETY DISORDER: ICD-10-CM

## 2023-05-22 DIAGNOSIS — F41.1 GENERALIZED ANXIETY DISORDER WITH PANIC ATTACKS: Primary | ICD-10-CM

## 2023-05-22 DIAGNOSIS — R53.83 OTHER FATIGUE: Primary | ICD-10-CM

## 2023-05-22 DIAGNOSIS — F95.2 TOURETTE SYNDROME: ICD-10-CM

## 2023-05-22 DIAGNOSIS — F84.0 AUTISM SPECTRUM DISORDER: ICD-10-CM

## 2023-05-22 DIAGNOSIS — F41.0 GENERALIZED ANXIETY DISORDER WITH PANIC ATTACKS: Primary | ICD-10-CM

## 2023-05-22 DIAGNOSIS — F33.0 MILD EPISODE OF RECURRENT MAJOR DEPRESSIVE DISORDER (HCC): ICD-10-CM

## 2023-05-22 PROCEDURE — 3079F DIAST BP 80-89 MM HG: CPT | Performed by: STUDENT IN AN ORGANIZED HEALTH CARE EDUCATION/TRAINING PROGRAM

## 2023-05-22 PROCEDURE — 1036F TOBACCO NON-USER: CPT | Performed by: STUDENT IN AN ORGANIZED HEALTH CARE EDUCATION/TRAINING PROGRAM

## 2023-05-22 PROCEDURE — 3074F SYST BP LT 130 MM HG: CPT | Performed by: STUDENT IN AN ORGANIZED HEALTH CARE EDUCATION/TRAINING PROGRAM

## 2023-05-22 PROCEDURE — 99214 OFFICE O/P EST MOD 30 MIN: CPT | Performed by: STUDENT IN AN ORGANIZED HEALTH CARE EDUCATION/TRAINING PROGRAM

## 2023-05-22 PROCEDURE — G8427 DOCREV CUR MEDS BY ELIG CLIN: HCPCS | Performed by: STUDENT IN AN ORGANIZED HEALTH CARE EDUCATION/TRAINING PROGRAM

## 2023-05-22 PROCEDURE — G8417 CALC BMI ABV UP PARAM F/U: HCPCS | Performed by: STUDENT IN AN ORGANIZED HEALTH CARE EDUCATION/TRAINING PROGRAM

## 2023-05-22 ASSESSMENT — PATIENT HEALTH QUESTIONNAIRE - PHQ9
6. FEELING BAD ABOUT YOURSELF - OR THAT YOU ARE A FAILURE OR HAVE LET YOURSELF OR YOUR FAMILY DOWN: 1
9. THOUGHTS THAT YOU WOULD BE BETTER OFF DEAD, OR OF HURTING YOURSELF: 0
SUM OF ALL RESPONSES TO PHQ QUESTIONS 1-9: 15
10. IF YOU CHECKED OFF ANY PROBLEMS, HOW DIFFICULT HAVE THESE PROBLEMS MADE IT FOR YOU TO DO YOUR WORK, TAKE CARE OF THINGS AT HOME, OR GET ALONG WITH OTHER PEOPLE: 1
5. POOR APPETITE OR OVEREATING: 2
1. LITTLE INTEREST OR PLEASURE IN DOING THINGS: 2
SUM OF ALL RESPONSES TO PHQ QUESTIONS 1-9: 15
4. FEELING TIRED OR HAVING LITTLE ENERGY: 3
3. TROUBLE FALLING OR STAYING ASLEEP: 3
SUM OF ALL RESPONSES TO PHQ QUESTIONS 1-9: 15
SUM OF ALL RESPONSES TO PHQ9 QUESTIONS 1 & 2: 3
2. FEELING DOWN, DEPRESSED OR HOPELESS: 1
8. MOVING OR SPEAKING SO SLOWLY THAT OTHER PEOPLE COULD HAVE NOTICED. OR THE OPPOSITE, BEING SO FIGETY OR RESTLESS THAT YOU HAVE BEEN MOVING AROUND A LOT MORE THAN USUAL: 2
SUM OF ALL RESPONSES TO PHQ QUESTIONS 1-9: 15
7. TROUBLE CONCENTRATING ON THINGS, SUCH AS READING THE NEWSPAPER OR WATCHING TELEVISION: 1

## 2023-05-22 ASSESSMENT — ANXIETY QUESTIONNAIRES
4. TROUBLE RELAXING: 2
6. BECOMING EASILY ANNOYED OR IRRITABLE: 2
1. FEELING NERVOUS, ANXIOUS, OR ON EDGE: 2
3. WORRYING TOO MUCH ABOUT DIFFERENT THINGS: 2
GAD7 TOTAL SCORE: 12
7. FEELING AFRAID AS IF SOMETHING AWFUL MIGHT HAPPEN: 1
5. BEING SO RESTLESS THAT IT IS HARD TO SIT STILL: 1
IF YOU CHECKED OFF ANY PROBLEMS ON THIS QUESTIONNAIRE, HOW DIFFICULT HAVE THESE PROBLEMS MADE IT FOR YOU TO DO YOUR WORK, TAKE CARE OF THINGS AT HOME, OR GET ALONG WITH OTHER PEOPLE: SOMEWHAT DIFFICULT
2. NOT BEING ABLE TO STOP OR CONTROL WORRYING: 2

## 2023-07-12 DIAGNOSIS — R51.9 CHRONIC DAILY HEADACHE: ICD-10-CM

## 2023-07-12 DIAGNOSIS — I10 ESSENTIAL HYPERTENSION: ICD-10-CM

## 2023-07-12 RX ORDER — LISINOPRIL 10 MG/1
TABLET ORAL
Qty: 30 TABLET | Refills: 5 | Status: SHIPPED | OUTPATIENT
Start: 2023-07-12

## 2023-07-31 DIAGNOSIS — F41.0 GENERALIZED ANXIETY DISORDER WITH PANIC ATTACKS: ICD-10-CM

## 2023-07-31 DIAGNOSIS — F33.0 MILD EPISODE OF RECURRENT MAJOR DEPRESSIVE DISORDER (HCC): ICD-10-CM

## 2023-07-31 DIAGNOSIS — F41.1 GENERALIZED ANXIETY DISORDER WITH PANIC ATTACKS: ICD-10-CM

## 2023-08-01 RX ORDER — BUPROPION HYDROCHLORIDE 300 MG/1
TABLET ORAL
Qty: 90 TABLET | Refills: 1 | Status: SHIPPED | OUTPATIENT
Start: 2023-08-01

## 2023-08-01 RX ORDER — BUPROPION HYDROCHLORIDE 150 MG/1
TABLET ORAL
Qty: 90 TABLET | Refills: 1 | Status: SHIPPED | OUTPATIENT
Start: 2023-08-01

## 2023-08-01 RX ORDER — FLUOXETINE HYDROCHLORIDE 40 MG/1
CAPSULE ORAL
Qty: 90 CAPSULE | Refills: 1 | Status: SHIPPED | OUTPATIENT
Start: 2023-08-01

## 2023-08-05 DIAGNOSIS — F33.0 MILD EPISODE OF RECURRENT MAJOR DEPRESSIVE DISORDER (HCC): ICD-10-CM

## 2023-08-06 RX ORDER — BUPROPION HYDROCHLORIDE 150 MG/1
TABLET ORAL
Qty: 90 TABLET | Refills: 1 | OUTPATIENT
Start: 2023-08-06

## 2023-08-07 NOTE — PROGRESS NOTES
Rectal bleeding    Nasal obstruction    Deviated nasal septum    Hypertrophy of inferior nasal turbinate    Social anxiety disorder    Binge eating disorder        Brief Psych Hx:  Hosp: Denied  Diagnoses: Bipolar 2 disorder, generalized anxiety disorder  Med trials: lamotrigine,    Outpt: Previously worked with NP Elham Dumont  NSSI: Denied  Suicide Attempts: Denied    O:  Wt Readings from Last 3 Encounters:   08/09/23 237 lb 12.8 oz (107.9 kg)   05/22/23 234 lb 6.4 oz (106.3 kg)   04/18/23 233 lb (105.7 kg)       Vitals:    08/09/23 0811   BP: (!) 146/98   Pulse: 68   Resp: 12   Weight: 237 lb 12.8 oz (107.9 kg)       Mental Status Exam:   Appearance:    Appropriately dressed  Motor: No abnormal movements, tics or mannerisms. Eye Contact: Fair at best  Speech:    Normal speech impediment noted  Language:   Appropriate diction  Mood/Affect: \"Alright\"/ Blunting noted but reactive  Thought Process:   Linear  Thought Content:    Some depressive and anxious content present, no SI/HI  Hallucinations:   Denied, not seen to be responding to IS  Associations:   Intact  Attention/Concentration:   Intact  Orientation:    Alert and oriented x4  Memory:   Intact  Fund of Knowledge:    Appropriate for age and education  Insight/Judgement:   Intact/intact      PHQ-9 Questionaire 8/9/2023 5/22/2023   Little interest or pleasure in doing things 2 2   Feeling down, depressed, or hopeless 2 1   Trouble falling or staying asleep, or sleeping too much 3 3   Feeling tired or having little energy 3 3   Poor appetite or overeating 3 2   Feeling bad about yourself - or that you are a failure or have let yourself or your family down 1 1   Trouble concentrating on things, such as reading the newspaper or watching television 1 1   Moving or speaking so slowly that other people could have noticed.  Or the opposite - being so fidgety or restless that you have been moving around a lot more than usual 0 2   Thoughts that you would be better off dead,

## 2023-08-08 DIAGNOSIS — F33.0 MILD EPISODE OF RECURRENT MAJOR DEPRESSIVE DISORDER (HCC): ICD-10-CM

## 2023-08-09 ENCOUNTER — OFFICE VISIT (OUTPATIENT)
Dept: PSYCHIATRY | Age: 28
End: 2023-08-09
Payer: MEDICAID

## 2023-08-09 VITALS
DIASTOLIC BLOOD PRESSURE: 98 MMHG | HEART RATE: 68 BPM | RESPIRATION RATE: 12 BRPM | SYSTOLIC BLOOD PRESSURE: 146 MMHG | BODY MASS INDEX: 34.12 KG/M2 | WEIGHT: 237.8 LBS

## 2023-08-09 DIAGNOSIS — F41.0 GENERALIZED ANXIETY DISORDER WITH PANIC ATTACKS: ICD-10-CM

## 2023-08-09 DIAGNOSIS — F40.10 SOCIAL ANXIETY DISORDER: ICD-10-CM

## 2023-08-09 DIAGNOSIS — F95.2 TOURETTE SYNDROME: ICD-10-CM

## 2023-08-09 DIAGNOSIS — F41.1 GENERALIZED ANXIETY DISORDER WITH PANIC ATTACKS: ICD-10-CM

## 2023-08-09 DIAGNOSIS — F50.81 BINGE EATING DISORDER: Primary | ICD-10-CM

## 2023-08-09 DIAGNOSIS — F33.0 MILD EPISODE OF RECURRENT MAJOR DEPRESSIVE DISORDER (HCC): ICD-10-CM

## 2023-08-09 DIAGNOSIS — F84.0 AUTISM SPECTRUM DISORDER: ICD-10-CM

## 2023-08-09 PROBLEM — F50.819 BINGE EATING DISORDER: Status: ACTIVE | Noted: 2023-08-09

## 2023-08-09 PROCEDURE — G8427 DOCREV CUR MEDS BY ELIG CLIN: HCPCS | Performed by: STUDENT IN AN ORGANIZED HEALTH CARE EDUCATION/TRAINING PROGRAM

## 2023-08-09 PROCEDURE — 99214 OFFICE O/P EST MOD 30 MIN: CPT | Performed by: STUDENT IN AN ORGANIZED HEALTH CARE EDUCATION/TRAINING PROGRAM

## 2023-08-09 PROCEDURE — G8417 CALC BMI ABV UP PARAM F/U: HCPCS | Performed by: STUDENT IN AN ORGANIZED HEALTH CARE EDUCATION/TRAINING PROGRAM

## 2023-08-09 PROCEDURE — 3080F DIAST BP >= 90 MM HG: CPT | Performed by: STUDENT IN AN ORGANIZED HEALTH CARE EDUCATION/TRAINING PROGRAM

## 2023-08-09 PROCEDURE — 1036F TOBACCO NON-USER: CPT | Performed by: STUDENT IN AN ORGANIZED HEALTH CARE EDUCATION/TRAINING PROGRAM

## 2023-08-09 PROCEDURE — 3077F SYST BP >= 140 MM HG: CPT | Performed by: STUDENT IN AN ORGANIZED HEALTH CARE EDUCATION/TRAINING PROGRAM

## 2023-08-09 RX ORDER — ARIPIPRAZOLE 5 MG/1
5 TABLET ORAL DAILY
Qty: 90 TABLET | Refills: 1 | Status: SHIPPED | OUTPATIENT
Start: 2023-08-09

## 2023-08-09 RX ORDER — ARIPIPRAZOLE 5 MG/1
TABLET ORAL
Qty: 30 TABLET | OUTPATIENT
Start: 2023-08-09

## 2023-08-09 RX ORDER — METHYLPHENIDATE HYDROCHLORIDE 10 MG/1
10 TABLET ORAL 2 TIMES DAILY
Qty: 60 TABLET | Refills: 0 | Status: SHIPPED | OUTPATIENT
Start: 2023-08-09 | End: 2023-09-08

## 2023-08-09 ASSESSMENT — ENCOUNTER SYMPTOMS
RESPIRATORY NEGATIVE: 1
ALLERGIC/IMMUNOLOGIC NEGATIVE: 1
EYES NEGATIVE: 1
GASTROINTESTINAL NEGATIVE: 1

## 2023-08-09 ASSESSMENT — ANXIETY QUESTIONNAIRES
GAD7 TOTAL SCORE: 9
1. FEELING NERVOUS, ANXIOUS, OR ON EDGE: 2
4. TROUBLE RELAXING: 1
IF YOU CHECKED OFF ANY PROBLEMS ON THIS QUESTIONNAIRE, HOW DIFFICULT HAVE THESE PROBLEMS MADE IT FOR YOU TO DO YOUR WORK, TAKE CARE OF THINGS AT HOME, OR GET ALONG WITH OTHER PEOPLE: VERY DIFFICULT
6. BECOMING EASILY ANNOYED OR IRRITABLE: 2
5. BEING SO RESTLESS THAT IT IS HARD TO SIT STILL: 1
2. NOT BEING ABLE TO STOP OR CONTROL WORRYING: 1
3. WORRYING TOO MUCH ABOUT DIFFERENT THINGS: 1
7. FEELING AFRAID AS IF SOMETHING AWFUL MIGHT HAPPEN: 1

## 2023-08-09 ASSESSMENT — PATIENT HEALTH QUESTIONNAIRE - PHQ9
1. LITTLE INTEREST OR PLEASURE IN DOING THINGS: 2
5. POOR APPETITE OR OVEREATING: 3
2. FEELING DOWN, DEPRESSED OR HOPELESS: 2
9. THOUGHTS THAT YOU WOULD BE BETTER OFF DEAD, OR OF HURTING YOURSELF: 0
SUM OF ALL RESPONSES TO PHQ QUESTIONS 1-9: 15
7. TROUBLE CONCENTRATING ON THINGS, SUCH AS READING THE NEWSPAPER OR WATCHING TELEVISION: 1
10. IF YOU CHECKED OFF ANY PROBLEMS, HOW DIFFICULT HAVE THESE PROBLEMS MADE IT FOR YOU TO DO YOUR WORK, TAKE CARE OF THINGS AT HOME, OR GET ALONG WITH OTHER PEOPLE: 2
8. MOVING OR SPEAKING SO SLOWLY THAT OTHER PEOPLE COULD HAVE NOTICED. OR THE OPPOSITE, BEING SO FIGETY OR RESTLESS THAT YOU HAVE BEEN MOVING AROUND A LOT MORE THAN USUAL: 0
4. FEELING TIRED OR HAVING LITTLE ENERGY: 3
SUM OF ALL RESPONSES TO PHQ QUESTIONS 1-9: 15
6. FEELING BAD ABOUT YOURSELF - OR THAT YOU ARE A FAILURE OR HAVE LET YOURSELF OR YOUR FAMILY DOWN: 1
SUM OF ALL RESPONSES TO PHQ QUESTIONS 1-9: 15
SUM OF ALL RESPONSES TO PHQ9 QUESTIONS 1 & 2: 4
3. TROUBLE FALLING OR STAYING ASLEEP: 3
SUM OF ALL RESPONSES TO PHQ QUESTIONS 1-9: 15

## 2023-08-21 ENCOUNTER — OFFICE VISIT (OUTPATIENT)
Dept: INTERNAL MEDICINE CLINIC | Age: 28
End: 2023-08-21
Payer: MEDICAID

## 2023-08-21 VITALS
HEART RATE: 87 BPM | SYSTOLIC BLOOD PRESSURE: 120 MMHG | DIASTOLIC BLOOD PRESSURE: 84 MMHG | BODY MASS INDEX: 33.66 KG/M2 | WEIGHT: 234.6 LBS | OXYGEN SATURATION: 98 %

## 2023-08-21 DIAGNOSIS — E11.9 TYPE 2 DIABETES MELLITUS WITHOUT COMPLICATION, WITHOUT LONG-TERM CURRENT USE OF INSULIN (HCC): Primary | ICD-10-CM

## 2023-08-21 DIAGNOSIS — L02.93 RECURRENT BOILS: ICD-10-CM

## 2023-08-21 DIAGNOSIS — E11.9 TYPE 2 DIABETES MELLITUS WITHOUT COMPLICATION, WITHOUT LONG-TERM CURRENT USE OF INSULIN (HCC): ICD-10-CM

## 2023-08-21 DIAGNOSIS — J30.81 ALLERGIC RHINITIS DUE TO ANIMAL HAIR AND DANDER: ICD-10-CM

## 2023-08-21 DIAGNOSIS — L73.2 HIDRADENITIS SUPPURATIVA: ICD-10-CM

## 2023-08-21 PROBLEM — L02.92 RECURRENT BOILS: Status: ACTIVE | Noted: 2023-08-21

## 2023-08-21 PROCEDURE — 3074F SYST BP LT 130 MM HG: CPT | Performed by: INTERNAL MEDICINE

## 2023-08-21 PROCEDURE — G8417 CALC BMI ABV UP PARAM F/U: HCPCS | Performed by: INTERNAL MEDICINE

## 2023-08-21 PROCEDURE — 3044F HG A1C LEVEL LT 7.0%: CPT | Performed by: INTERNAL MEDICINE

## 2023-08-21 PROCEDURE — 99214 OFFICE O/P EST MOD 30 MIN: CPT | Performed by: INTERNAL MEDICINE

## 2023-08-21 PROCEDURE — 3079F DIAST BP 80-89 MM HG: CPT | Performed by: INTERNAL MEDICINE

## 2023-08-21 PROCEDURE — 2022F DILAT RTA XM EVC RTNOPTHY: CPT | Performed by: INTERNAL MEDICINE

## 2023-08-21 PROCEDURE — 1036F TOBACCO NON-USER: CPT | Performed by: INTERNAL MEDICINE

## 2023-08-21 PROCEDURE — G8427 DOCREV CUR MEDS BY ELIG CLIN: HCPCS | Performed by: INTERNAL MEDICINE

## 2023-08-21 RX ORDER — CLINDAMYCIN PHOSPHATE 10 MG/G
GEL TOPICAL
Qty: 60 G | Refills: 5 | Status: SHIPPED | OUTPATIENT
Start: 2023-08-21 | End: 2023-08-28

## 2023-08-21 RX ORDER — CETIRIZINE HYDROCHLORIDE 10 MG/1
10 TABLET ORAL 2 TIMES DAILY
Qty: 60 TABLET | Refills: 5 | Status: SHIPPED | OUTPATIENT
Start: 2023-08-21

## 2023-08-21 NOTE — ASSESSMENT & PLAN NOTE
Discussed trialing Zyrtec twice a day versus adding montelukast.  He would like to try Zyrtec twice a day.

## 2023-08-21 NOTE — PROGRESS NOTES
90 tablet 1    methylphenidate (RITALIN) 10 MG tablet Take 1 tablet by mouth 2 times daily at 0800 and 1400 for 30 days. Max Daily Amount: 20 mg 60 tablet 0    FLUoxetine (PROZAC) 40 MG capsule TAKE ONE CAPSULE BY MOUTH DAILY 90 capsule 1    buPROPion (WELLBUTRIN XL) 300 MG extended release tablet TAKE ONE TABLET BY MOUTH EVERY MORNING 90 tablet 1    buPROPion (WELLBUTRIN XL) 150 MG extended release tablet Take 1 tablet every morning in addition to the 300mg dose for a total of 450mg daily. 90 tablet 1    lisinopril (PRINIVIL;ZESTRIL) 10 MG tablet TAKE ONE TABLET BY MOUTH DAILY 30 tablet 5    FLUoxetine (PROZAC) 20 MG capsule TAKE ONE CAPSULE BY MOUTH DAILY 90 capsule 1    omeprazole (PRILOSEC) 40 MG delayed release capsule Take 1 capsule by mouth every morning (before breakfast) 30 capsule 5    ondansetron (ZOFRAN) 4 MG tablet TAKE ONE TABLET BY MOUTH DAILY AS NEEDED FOR NAUSEA OR VOMITING 30 tablet 0    atorvastatin (LIPITOR) 80 MG tablet Take 1 tablet by mouth nightly 30 tablet 3    aspirin-acetaminophen-caffeine (EXCEDRIN MIGRAINE) 250-250-65 MG per tablet Take 1 tablet by mouth every 6 hours as needed for Headaches 90 tablet 3    BLOOD GLUCOSE MONITOR 1 Device One Touch Verio Flex meter  (Patient not taking: Reported on 8/21/2023)      blood glucose monitor strips 125 strips by Other route Test 4 times a day & as needed for symptoms of irregular blood glucose. Dispense sufficient amount for indicated testing frequency plus additional to accommodate PRN testing needs     verio flex testing strips.  (Patient not taking: Reported on 8/21/2023)      Lancets Micro Thin 33G MISC by Does not apply route One touch Verio Flex  (Patient not taking: Reported on 8/21/2023)      Insulin Pen Needle ([employer] PEN NEEDLES) 31G X 6 MM MISC 1 each by Does not apply route 5 times daily May substitute brand based on insurance formulary (Patient not taking: Reported on 8/21/2023) 100 each 3     No current facility-administered

## 2023-08-21 NOTE — ASSESSMENT & PLAN NOTE
Off of insulin. Currently not taking metformin either -which is as he was instructed. Hemoglobin A1C   Date Value Ref Range Status   04/18/2023 5.9 % Final     He may want to take 500mg of metformin daily. We will check A1c first prior to making that decision.  recommend eye exam.

## 2023-08-22 LAB
ALBUMIN SERPL-MCNC: 4.8 G/DL (ref 3.4–5)
ALBUMIN/GLOB SERPL: 1.9 {RATIO} (ref 1.1–2.2)
ALP SERPL-CCNC: 35 U/L (ref 40–129)
ALT SERPL-CCNC: 111 U/L (ref 10–40)
ANION GAP SERPL CALCULATED.3IONS-SCNC: 12 MMOL/L (ref 3–16)
AST SERPL-CCNC: 52 U/L (ref 15–37)
BASOPHILS # BLD: 0.1 K/UL (ref 0–0.2)
BASOPHILS NFR BLD: 1.2 %
BILIRUB SERPL-MCNC: 0.4 MG/DL (ref 0–1)
BUN SERPL-MCNC: 13 MG/DL (ref 7–20)
CALCIUM SERPL-MCNC: 9.7 MG/DL (ref 8.3–10.6)
CHLORIDE SERPL-SCNC: 105 MMOL/L (ref 99–110)
CO2 SERPL-SCNC: 26 MMOL/L (ref 21–32)
CREAT SERPL-MCNC: 1 MG/DL (ref 0.9–1.3)
DEPRECATED RDW RBC AUTO: 13.3 % (ref 12.4–15.4)
EOSINOPHIL # BLD: 0.6 K/UL (ref 0–0.6)
EOSINOPHIL NFR BLD: 7.9 %
EST. AVERAGE GLUCOSE BLD GHB EST-MCNC: 154.2 MG/DL
GFR SERPLBLD CREATININE-BSD FMLA CKD-EPI: >60 ML/MIN/{1.73_M2}
GLUCOSE SERPL-MCNC: 182 MG/DL (ref 70–99)
HBA1C MFR BLD: 7 %
HCT VFR BLD AUTO: 42.9 % (ref 40.5–52.5)
HGB BLD-MCNC: 14.8 G/DL (ref 13.5–17.5)
LYMPHOCYTES # BLD: 2.1 K/UL (ref 1–5.1)
LYMPHOCYTES NFR BLD: 27.4 %
MCH RBC QN AUTO: 31.4 PG (ref 26–34)
MCHC RBC AUTO-ENTMCNC: 34.5 G/DL (ref 31–36)
MCV RBC AUTO: 91 FL (ref 80–100)
MONOCYTES # BLD: 0.4 K/UL (ref 0–1.3)
MONOCYTES NFR BLD: 5.2 %
NEUTROPHILS # BLD: 4.4 K/UL (ref 1.7–7.7)
NEUTROPHILS NFR BLD: 58.3 %
PLATELET # BLD AUTO: 276 K/UL (ref 135–450)
PMV BLD AUTO: 9.6 FL (ref 5–10.5)
POTASSIUM SERPL-SCNC: 4.2 MMOL/L (ref 3.5–5.1)
PROT SERPL-MCNC: 7.3 G/DL (ref 6.4–8.2)
RBC # BLD AUTO: 4.71 M/UL (ref 4.2–5.9)
SODIUM SERPL-SCNC: 143 MMOL/L (ref 136–145)
WBC # BLD AUTO: 7.5 K/UL (ref 4–11)

## 2023-08-22 RX ORDER — METFORMIN HYDROCHLORIDE 500 MG/1
500 TABLET, EXTENDED RELEASE ORAL
Qty: 90 TABLET | Refills: 1 | Status: SHIPPED | OUTPATIENT
Start: 2023-08-22

## 2023-09-13 ENCOUNTER — OFFICE VISIT (OUTPATIENT)
Dept: PSYCHIATRY | Age: 28
End: 2023-09-13
Payer: MEDICAID

## 2023-09-13 VITALS
BODY MASS INDEX: 33.95 KG/M2 | DIASTOLIC BLOOD PRESSURE: 90 MMHG | WEIGHT: 236.6 LBS | RESPIRATION RATE: 12 BRPM | SYSTOLIC BLOOD PRESSURE: 126 MMHG | HEART RATE: 70 BPM

## 2023-09-13 DIAGNOSIS — F84.0 AUTISM SPECTRUM DISORDER: ICD-10-CM

## 2023-09-13 DIAGNOSIS — F41.1 GENERALIZED ANXIETY DISORDER WITH PANIC ATTACKS: ICD-10-CM

## 2023-09-13 DIAGNOSIS — F41.0 GENERALIZED ANXIETY DISORDER WITH PANIC ATTACKS: ICD-10-CM

## 2023-09-13 DIAGNOSIS — F95.2 TOURETTE SYNDROME: ICD-10-CM

## 2023-09-13 DIAGNOSIS — F40.10 SOCIAL ANXIETY DISORDER: ICD-10-CM

## 2023-09-13 DIAGNOSIS — F50.81 BINGE EATING DISORDER: ICD-10-CM

## 2023-09-13 DIAGNOSIS — F33.0 MILD EPISODE OF RECURRENT MAJOR DEPRESSIVE DISORDER (HCC): Primary | ICD-10-CM

## 2023-09-13 PROBLEM — I10 ESSENTIAL HYPERTENSION: Chronic | Status: ACTIVE | Noted: 2021-05-18

## 2023-09-13 PROBLEM — E11.9 TYPE 2 DIABETES MELLITUS WITHOUT COMPLICATION, WITHOUT LONG-TERM CURRENT USE OF INSULIN (HCC): Chronic | Status: ACTIVE | Noted: 2021-09-22

## 2023-09-13 PROCEDURE — 99214 OFFICE O/P EST MOD 30 MIN: CPT | Performed by: STUDENT IN AN ORGANIZED HEALTH CARE EDUCATION/TRAINING PROGRAM

## 2023-09-13 PROCEDURE — G8427 DOCREV CUR MEDS BY ELIG CLIN: HCPCS | Performed by: STUDENT IN AN ORGANIZED HEALTH CARE EDUCATION/TRAINING PROGRAM

## 2023-09-13 PROCEDURE — 3080F DIAST BP >= 90 MM HG: CPT | Performed by: STUDENT IN AN ORGANIZED HEALTH CARE EDUCATION/TRAINING PROGRAM

## 2023-09-13 PROCEDURE — 3074F SYST BP LT 130 MM HG: CPT | Performed by: STUDENT IN AN ORGANIZED HEALTH CARE EDUCATION/TRAINING PROGRAM

## 2023-09-13 PROCEDURE — 1036F TOBACCO NON-USER: CPT | Performed by: STUDENT IN AN ORGANIZED HEALTH CARE EDUCATION/TRAINING PROGRAM

## 2023-09-13 PROCEDURE — G8417 CALC BMI ABV UP PARAM F/U: HCPCS | Performed by: STUDENT IN AN ORGANIZED HEALTH CARE EDUCATION/TRAINING PROGRAM

## 2023-09-13 ASSESSMENT — PATIENT HEALTH QUESTIONNAIRE - PHQ9
2. FEELING DOWN, DEPRESSED OR HOPELESS: 1
5. POOR APPETITE OR OVEREATING: 2
1. LITTLE INTEREST OR PLEASURE IN DOING THINGS: 2
10. IF YOU CHECKED OFF ANY PROBLEMS, HOW DIFFICULT HAVE THESE PROBLEMS MADE IT FOR YOU TO DO YOUR WORK, TAKE CARE OF THINGS AT HOME, OR GET ALONG WITH OTHER PEOPLE: 1
SUM OF ALL RESPONSES TO PHQ QUESTIONS 1-9: 15
8. MOVING OR SPEAKING SO SLOWLY THAT OTHER PEOPLE COULD HAVE NOTICED. OR THE OPPOSITE, BEING SO FIGETY OR RESTLESS THAT YOU HAVE BEEN MOVING AROUND A LOT MORE THAN USUAL: 2
SUM OF ALL RESPONSES TO PHQ QUESTIONS 1-9: 15
3. TROUBLE FALLING OR STAYING ASLEEP: 3
6. FEELING BAD ABOUT YOURSELF - OR THAT YOU ARE A FAILURE OR HAVE LET YOURSELF OR YOUR FAMILY DOWN: 1
SUM OF ALL RESPONSES TO PHQ9 QUESTIONS 1 & 2: 3
4. FEELING TIRED OR HAVING LITTLE ENERGY: 3
7. TROUBLE CONCENTRATING ON THINGS, SUCH AS READING THE NEWSPAPER OR WATCHING TELEVISION: 1
SUM OF ALL RESPONSES TO PHQ QUESTIONS 1-9: 15
SUM OF ALL RESPONSES TO PHQ QUESTIONS 1-9: 15
9. THOUGHTS THAT YOU WOULD BE BETTER OFF DEAD, OR OF HURTING YOURSELF: 0

## 2023-09-13 ASSESSMENT — ANXIETY QUESTIONNAIRES
1. FEELING NERVOUS, ANXIOUS, OR ON EDGE: 2
7. FEELING AFRAID AS IF SOMETHING AWFUL MIGHT HAPPEN: 1
2. NOT BEING ABLE TO STOP OR CONTROL WORRYING: 2
4. TROUBLE RELAXING: 1
IF YOU CHECKED OFF ANY PROBLEMS ON THIS QUESTIONNAIRE, HOW DIFFICULT HAVE THESE PROBLEMS MADE IT FOR YOU TO DO YOUR WORK, TAKE CARE OF THINGS AT HOME, OR GET ALONG WITH OTHER PEOPLE: SOMEWHAT DIFFICULT
3. WORRYING TOO MUCH ABOUT DIFFERENT THINGS: 1
5. BEING SO RESTLESS THAT IT IS HARD TO SIT STILL: 2
6. BECOMING EASILY ANNOYED OR IRRITABLE: 2
GAD7 TOTAL SCORE: 11

## 2023-09-13 ASSESSMENT — ENCOUNTER SYMPTOMS
ALLERGIC/IMMUNOLOGIC NEGATIVE: 1
RESPIRATORY NEGATIVE: 1
GASTROINTESTINAL NEGATIVE: 1
EYES NEGATIVE: 1

## 2023-09-13 NOTE — PROGRESS NOTES
PSYCHIATRY PROGRESS NOTE    Macho Forde  1995 09/13/2023  Face to Face time: 25 minutes  PCP: Edwina Peabody, MD    CC:   Chief Complaint   Patient presents with    Follow-up       Patient is a 33 y.o. male with significant past medical history of chronic headache, chronic back pain, hypertension, type 2 diabetes who presents to the psychiatry clinic today for evaluation and management of depression and anxiety. A:  Patient's presentation today remains essentially unchanged from previous. The methylphenidate trialed last time was ineffective at producing any of the potential desired changes. We will continue to follow and provide support. Diagnosis:  Binge eating disorder  Major depressive disorder, recurrent, mild  Generalized anxiety disorder with panic  Social anxiety disorder, severity unspecified  Tourette syndrome  Autism spectrum disorder    P:   In light of his appointment later today with sleep medicine, will defer additional medications added to the regimen to try and cover BED. Discontinue methylphenidate  Continue fluoxetine 60mg daily, bupropion xl 450mg daily, aripiprazole 5mg daily    Medication Monitoring:    - PDMP reviewed: Methylphenidate IR 10mg bid 30-day supply filled 8/9/2023. Follow-up: 6 weeks (after sleep study hopefully scheduled)    Safety: Pt was counseled on the potential for increased suicidal ideations and advised on potential options for dealing with these including hotlines, calling the office, or going to the nearest emergency room. __________________________________________________________________________    S:   Patient indicated that he was doing ok. He stated that the medication trialed previously (methylphenidate) hadn't done much. Still overeating at times, though no binges noted. Still hypersomnolent, with the patient noting that he was actually able several times to fall back asleep despite having taken the stimulant.   No additional

## 2023-10-09 ENCOUNTER — HOSPITAL ENCOUNTER (OUTPATIENT)
Dept: SLEEP CENTER | Age: 28
Discharge: HOME | End: 2023-10-09
Payer: MEDICAID

## 2023-10-09 DIAGNOSIS — G47.10 HYPERSOMNIA: ICD-10-CM

## 2023-10-09 PROCEDURE — 95811 POLYSOM 6/>YRS CPAP 4/> PARM: CPT

## 2023-10-16 DIAGNOSIS — R51.9 CHRONIC DAILY HEADACHE: ICD-10-CM

## 2023-10-16 DIAGNOSIS — I10 ESSENTIAL HYPERTENSION: ICD-10-CM

## 2023-10-16 RX ORDER — LISINOPRIL 10 MG/1
10 TABLET ORAL DAILY
Qty: 90 TABLET | Refills: 1 | Status: SHIPPED | OUTPATIENT
Start: 2023-10-16

## 2023-10-17 ENCOUNTER — TELEPHONE (OUTPATIENT)
Dept: PULMONOLOGY | Age: 28
End: 2023-10-17

## 2023-10-25 ENCOUNTER — OFFICE VISIT (OUTPATIENT)
Dept: PSYCHIATRY | Age: 28
End: 2023-10-25
Payer: MEDICAID

## 2023-10-25 VITALS
BODY MASS INDEX: 34.93 KG/M2 | WEIGHT: 244 LBS | HEART RATE: 82 BPM | HEIGHT: 70 IN | SYSTOLIC BLOOD PRESSURE: 132 MMHG | DIASTOLIC BLOOD PRESSURE: 92 MMHG

## 2023-10-25 DIAGNOSIS — F40.10 SOCIAL ANXIETY DISORDER: ICD-10-CM

## 2023-10-25 DIAGNOSIS — F41.0 GENERALIZED ANXIETY DISORDER WITH PANIC ATTACKS: Primary | Chronic | ICD-10-CM

## 2023-10-25 DIAGNOSIS — F84.0 AUTISM SPECTRUM DISORDER: ICD-10-CM

## 2023-10-25 DIAGNOSIS — F95.2 TOURETTE SYNDROME: ICD-10-CM

## 2023-10-25 DIAGNOSIS — F41.1 GENERALIZED ANXIETY DISORDER WITH PANIC ATTACKS: Primary | Chronic | ICD-10-CM

## 2023-10-25 DIAGNOSIS — F33.0 MILD EPISODE OF RECURRENT MAJOR DEPRESSIVE DISORDER (HCC): ICD-10-CM

## 2023-10-25 PROCEDURE — 99213 OFFICE O/P EST LOW 20 MIN: CPT | Performed by: STUDENT IN AN ORGANIZED HEALTH CARE EDUCATION/TRAINING PROGRAM

## 2023-10-25 PROCEDURE — G8484 FLU IMMUNIZE NO ADMIN: HCPCS | Performed by: STUDENT IN AN ORGANIZED HEALTH CARE EDUCATION/TRAINING PROGRAM

## 2023-10-25 PROCEDURE — 3080F DIAST BP >= 90 MM HG: CPT | Performed by: STUDENT IN AN ORGANIZED HEALTH CARE EDUCATION/TRAINING PROGRAM

## 2023-10-25 PROCEDURE — G8417 CALC BMI ABV UP PARAM F/U: HCPCS | Performed by: STUDENT IN AN ORGANIZED HEALTH CARE EDUCATION/TRAINING PROGRAM

## 2023-10-25 PROCEDURE — G8427 DOCREV CUR MEDS BY ELIG CLIN: HCPCS | Performed by: STUDENT IN AN ORGANIZED HEALTH CARE EDUCATION/TRAINING PROGRAM

## 2023-10-25 PROCEDURE — 1036F TOBACCO NON-USER: CPT | Performed by: STUDENT IN AN ORGANIZED HEALTH CARE EDUCATION/TRAINING PROGRAM

## 2023-10-25 PROCEDURE — 3075F SYST BP GE 130 - 139MM HG: CPT | Performed by: STUDENT IN AN ORGANIZED HEALTH CARE EDUCATION/TRAINING PROGRAM

## 2023-10-25 ASSESSMENT — PATIENT HEALTH QUESTIONNAIRE - PHQ9
SUM OF ALL RESPONSES TO PHQ QUESTIONS 1-9: 11
6. FEELING BAD ABOUT YOURSELF - OR THAT YOU ARE A FAILURE OR HAVE LET YOURSELF OR YOUR FAMILY DOWN: 1
9. THOUGHTS THAT YOU WOULD BE BETTER OFF DEAD, OR OF HURTING YOURSELF: 0
SUM OF ALL RESPONSES TO PHQ QUESTIONS 1-9: 11
3. TROUBLE FALLING OR STAYING ASLEEP: 3
8. MOVING OR SPEAKING SO SLOWLY THAT OTHER PEOPLE COULD HAVE NOTICED. OR THE OPPOSITE, BEING SO FIGETY OR RESTLESS THAT YOU HAVE BEEN MOVING AROUND A LOT MORE THAN USUAL: 1
5. POOR APPETITE OR OVEREATING: 0
4. FEELING TIRED OR HAVING LITTLE ENERGY: 3
7. TROUBLE CONCENTRATING ON THINGS, SUCH AS READING THE NEWSPAPER OR WATCHING TELEVISION: 1
1. LITTLE INTEREST OR PLEASURE IN DOING THINGS: 1
2. FEELING DOWN, DEPRESSED OR HOPELESS: 1
10. IF YOU CHECKED OFF ANY PROBLEMS, HOW DIFFICULT HAVE THESE PROBLEMS MADE IT FOR YOU TO DO YOUR WORK, TAKE CARE OF THINGS AT HOME, OR GET ALONG WITH OTHER PEOPLE: 1
SUM OF ALL RESPONSES TO PHQ9 QUESTIONS 1 & 2: 2

## 2023-10-25 ASSESSMENT — ANXIETY QUESTIONNAIRES
5. BEING SO RESTLESS THAT IT IS HARD TO SIT STILL: 2
1. FEELING NERVOUS, ANXIOUS, OR ON EDGE: 2
7. FEELING AFRAID AS IF SOMETHING AWFUL MIGHT HAPPEN: 1
GAD7 TOTAL SCORE: 12
2. NOT BEING ABLE TO STOP OR CONTROL WORRYING: 1
4. TROUBLE RELAXING: 2
IF YOU CHECKED OFF ANY PROBLEMS ON THIS QUESTIONNAIRE, HOW DIFFICULT HAVE THESE PROBLEMS MADE IT FOR YOU TO DO YOUR WORK, TAKE CARE OF THINGS AT HOME, OR GET ALONG WITH OTHER PEOPLE: SOMEWHAT DIFFICULT
6. BECOMING EASILY ANNOYED OR IRRITABLE: 2
3. WORRYING TOO MUCH ABOUT DIFFERENT THINGS: 2

## 2023-12-26 ENCOUNTER — PATIENT MESSAGE (OUTPATIENT)
Dept: INTERNAL MEDICINE CLINIC | Age: 28
End: 2023-12-26

## 2023-12-26 NOTE — TELEPHONE ENCOUNTER
From: Cordell Schulz  To: Dr. Claire Rae: 2023 4:06 PM EST  Subject: Insulin Needles    Hello,    I am not sure if I missed it in my bag, but I so not see needles for the insulin. I do not know where my old needles are and even if I found them ik not sure if they would fit because it is a different brand. Could you write me q prescription for them? I have everything else including the insulin.     Thank you,  Zaheer Orellana

## 2023-12-27 DIAGNOSIS — R10.13 DYSPEPSIA: ICD-10-CM

## 2023-12-28 RX ORDER — PEN NEEDLE, DIABETIC 31 G X1/4"
1 NEEDLE, DISPOSABLE MISCELLANEOUS
Qty: 100 EACH | Refills: 3 | Status: SHIPPED | OUTPATIENT
Start: 2023-12-28

## 2023-12-28 RX ORDER — OMEPRAZOLE 40 MG/1
40 CAPSULE, DELAYED RELEASE ORAL
Qty: 30 CAPSULE | Refills: 5 | Status: SHIPPED | OUTPATIENT
Start: 2023-12-28

## 2024-01-10 PROBLEM — E66.9 OBESITY (BMI 30.0-34.9): Status: ACTIVE | Noted: 2024-01-10

## 2024-01-10 PROBLEM — G47.33 OSA (OBSTRUCTIVE SLEEP APNEA): Status: ACTIVE | Noted: 2024-01-10

## 2024-01-10 PROBLEM — E66.811 OBESITY (BMI 30.0-34.9): Status: ACTIVE | Noted: 2024-01-10

## 2024-01-10 NOTE — PROGRESS NOTES
Diagnosis: [x] SHANE (G47.33) [] CSA (G47.31) [] Apnea (G47.30)   Length of Need: [x] 15 Months [] 99 Months [] Other:   Machine (ELKIN!): [] Respironics Dream Station      Auto [] ResMed AirSense     Auto [] Other:     []  CPAP () [] Bilevel ()   Mode: [] Auto [] Spontaneous    Mode: [] Auto [] Spontaneous             Comfort Settings:      Humidifier: [] Heated ()        [x] Water chamber replacement ()/ 1 per 6 months        Mask:   [] Nasal () /1 per 3 months [x] Full Face () /1 per 3 months   [] Patient choice -Size and fit mask [x] Patient Choice - Size and fit mask   [] Dispense: [] Dispense:   [] Headgear () / 1 per 3 months [x] Headgear () / 1 per 3 months   [] Replacement Nasal Cushion ()/2 per month [x] Interface Replacement ()/1 per month   [] Replacement Nasal Pillows ()/2 per month         Tubing: [x] Heated ()/1 per 3 months    [] Standard ()/1 per 3 months [] Other:           Filters: [x] Non-disposable ()/1 per 6 months     [x] Ultra-Fine, Disposable ()/2 per month        Miscellaneous: [] Chin Strap ()/ 1 per 6 months [] O2 bleed-in:        LPM   [] Oxymetry on CPAP/Bilevel [x]  Other: Mask re-fit        Start Order Date: 01/10/24    MEDICAL JUSTIFICATION:  I, the undersigned, certify that the above prescribed supplies are medically necessary for this patient’s wellbeing.  In my opinion, the supplies are both reasonable and necessary in reference to accepted standards of medicalpractice in treatment of this patient’s condition.    HAROLDO MISTRY NP    NPI: [id number]       Order Signed Date: 01/10/24  [facility]  Pulmonary, Sleep, and Critical Care    Pulmonary, Sleep, and Critical Care  [facility]                          [address] [place] [address], [address]  [place] [address]                                    [place] [address]  Phone: [phone]    Fax:

## 2024-01-15 DIAGNOSIS — F33.0 MILD EPISODE OF RECURRENT MAJOR DEPRESSIVE DISORDER (HCC): ICD-10-CM

## 2024-01-15 DIAGNOSIS — F41.0 GENERALIZED ANXIETY DISORDER WITH PANIC ATTACKS: ICD-10-CM

## 2024-01-15 DIAGNOSIS — F41.1 GENERALIZED ANXIETY DISORDER WITH PANIC ATTACKS: ICD-10-CM

## 2024-01-15 RX ORDER — BUPROPION HYDROCHLORIDE 300 MG/1
TABLET ORAL
Qty: 90 TABLET | Refills: 1 | Status: SHIPPED | OUTPATIENT
Start: 2024-01-15

## 2024-01-15 RX ORDER — BUPROPION HYDROCHLORIDE 150 MG/1
TABLET ORAL
Qty: 90 TABLET | Refills: 1 | Status: SHIPPED | OUTPATIENT
Start: 2024-01-15

## 2024-01-15 RX ORDER — FLUOXETINE HYDROCHLORIDE 40 MG/1
CAPSULE ORAL
Qty: 90 CAPSULE | Refills: 1 | Status: SHIPPED | OUTPATIENT
Start: 2024-01-15

## 2024-01-17 NOTE — PROGRESS NOTES
PSYCHIATRY PROGRESS NOTE    Macho Forde  1995 01/24/2024  Face to Face time: 30 minutes  PCP: Val López MD    CC:   Chief Complaint   Patient presents with    Follow-up       Patient is a 28 y.o. male with significant past medical history of chronic headache, chronic back pain, hypertension, type 2 diabetes, and obstructive sleep apnea who presents to the psychiatry clinic today for evaluation and management of depression and anxiety.    A:  Patient's presentation today is indicative of continued adequate support of his underlying mental health difficulties, however we have a new onset difficulty in the form of hyperglycemia.  We will attempt to adjust the regimen slightly to see if we can't split the difference and find adequate control without the issue.    Diagnosis:  Binge eating disorder  Major depressive disorder, recurrent, mild  Generalized anxiety disorder with panic  Social anxiety disorder, severity unspecified  Tourette syndrome  Autism spectrum disorder    P:   Decrease aripiprazole to 2.5mg daily.  Continue fluoxetine 60mg daily and bupropion xl 450mg daily    Medication Monitoring:    - PDMP reviewed: No current prescriptions    Follow-up: 6 weeks    Safety: Pt was counseled on the potential for increased suicidal ideations and advised on potential options for dealing with these including hotlines, calling the office, or going to the nearest emergency room.      __________________________________________________________________________    S:   Patient identified that he was doing alright at the moment and without big changes.  He stated that he had been having more physical difficulties lately and was concerned that the aripiprazole could be the culprit.  He's now got an A1c >10, which is new onset for him, as well as acne and his continued fatigue.      In terms of his mental health, the patient noted that he was \"not good, but good enough\".  He stated that he's not having major

## 2024-01-24 ENCOUNTER — OFFICE VISIT (OUTPATIENT)
Dept: PSYCHIATRY | Age: 29
End: 2024-01-24
Payer: MEDICAID

## 2024-01-24 VITALS
WEIGHT: 234 LBS | DIASTOLIC BLOOD PRESSURE: 98 MMHG | BODY MASS INDEX: 33.5 KG/M2 | HEART RATE: 100 BPM | SYSTOLIC BLOOD PRESSURE: 138 MMHG | HEIGHT: 70 IN

## 2024-01-24 DIAGNOSIS — F50.81 BINGE EATING DISORDER: ICD-10-CM

## 2024-01-24 DIAGNOSIS — F41.1 GENERALIZED ANXIETY DISORDER WITH PANIC ATTACKS: Primary | Chronic | ICD-10-CM

## 2024-01-24 DIAGNOSIS — F41.0 GENERALIZED ANXIETY DISORDER WITH PANIC ATTACKS: Primary | Chronic | ICD-10-CM

## 2024-01-24 DIAGNOSIS — F95.2 TOURETTE SYNDROME: ICD-10-CM

## 2024-01-24 DIAGNOSIS — F33.0 MILD EPISODE OF RECURRENT MAJOR DEPRESSIVE DISORDER (HCC): ICD-10-CM

## 2024-01-24 DIAGNOSIS — F84.0 AUTISM SPECTRUM DISORDER: ICD-10-CM

## 2024-01-24 PROCEDURE — G8417 CALC BMI ABV UP PARAM F/U: HCPCS | Performed by: STUDENT IN AN ORGANIZED HEALTH CARE EDUCATION/TRAINING PROGRAM

## 2024-01-24 PROCEDURE — G8482 FLU IMMUNIZE ORDER/ADMIN: HCPCS | Performed by: STUDENT IN AN ORGANIZED HEALTH CARE EDUCATION/TRAINING PROGRAM

## 2024-01-24 PROCEDURE — 1036F TOBACCO NON-USER: CPT | Performed by: STUDENT IN AN ORGANIZED HEALTH CARE EDUCATION/TRAINING PROGRAM

## 2024-01-24 PROCEDURE — 3075F SYST BP GE 130 - 139MM HG: CPT | Performed by: STUDENT IN AN ORGANIZED HEALTH CARE EDUCATION/TRAINING PROGRAM

## 2024-01-24 PROCEDURE — 3080F DIAST BP >= 90 MM HG: CPT | Performed by: STUDENT IN AN ORGANIZED HEALTH CARE EDUCATION/TRAINING PROGRAM

## 2024-01-24 PROCEDURE — 99214 OFFICE O/P EST MOD 30 MIN: CPT | Performed by: STUDENT IN AN ORGANIZED HEALTH CARE EDUCATION/TRAINING PROGRAM

## 2024-01-24 PROCEDURE — G8427 DOCREV CUR MEDS BY ELIG CLIN: HCPCS | Performed by: STUDENT IN AN ORGANIZED HEALTH CARE EDUCATION/TRAINING PROGRAM

## 2024-01-24 ASSESSMENT — ENCOUNTER SYMPTOMS
ALLERGIC/IMMUNOLOGIC NEGATIVE: 1
GASTROINTESTINAL NEGATIVE: 1
EYES NEGATIVE: 1
RESPIRATORY NEGATIVE: 1

## 2024-01-24 ASSESSMENT — PATIENT HEALTH QUESTIONNAIRE - PHQ9
SUM OF ALL RESPONSES TO PHQ9 QUESTIONS 1 & 2: 2
5. POOR APPETITE OR OVEREATING: 2
8. MOVING OR SPEAKING SO SLOWLY THAT OTHER PEOPLE COULD HAVE NOTICED. OR THE OPPOSITE, BEING SO FIGETY OR RESTLESS THAT YOU HAVE BEEN MOVING AROUND A LOT MORE THAN USUAL: 1
9. THOUGHTS THAT YOU WOULD BE BETTER OFF DEAD, OR OF HURTING YOURSELF: 0
6. FEELING BAD ABOUT YOURSELF - OR THAT YOU ARE A FAILURE OR HAVE LET YOURSELF OR YOUR FAMILY DOWN: 1
7. TROUBLE CONCENTRATING ON THINGS, SUCH AS READING THE NEWSPAPER OR WATCHING TELEVISION: 1
SUM OF ALL RESPONSES TO PHQ QUESTIONS 1-9: 11
2. FEELING DOWN, DEPRESSED OR HOPELESS: 1
3. TROUBLE FALLING OR STAYING ASLEEP: 2
SUM OF ALL RESPONSES TO PHQ QUESTIONS 1-9: 11
4. FEELING TIRED OR HAVING LITTLE ENERGY: 2
SUM OF ALL RESPONSES TO PHQ QUESTIONS 1-9: 11
10. IF YOU CHECKED OFF ANY PROBLEMS, HOW DIFFICULT HAVE THESE PROBLEMS MADE IT FOR YOU TO DO YOUR WORK, TAKE CARE OF THINGS AT HOME, OR GET ALONG WITH OTHER PEOPLE: 1
SUM OF ALL RESPONSES TO PHQ QUESTIONS 1-9: 11
1. LITTLE INTEREST OR PLEASURE IN DOING THINGS: 1

## 2024-01-24 ASSESSMENT — ANXIETY QUESTIONNAIRES
IF YOU CHECKED OFF ANY PROBLEMS ON THIS QUESTIONNAIRE, HOW DIFFICULT HAVE THESE PROBLEMS MADE IT FOR YOU TO DO YOUR WORK, TAKE CARE OF THINGS AT HOME, OR GET ALONG WITH OTHER PEOPLE: SOMEWHAT DIFFICULT
7. FEELING AFRAID AS IF SOMETHING AWFUL MIGHT HAPPEN: 1
4. TROUBLE RELAXING: 1
2. NOT BEING ABLE TO STOP OR CONTROL WORRYING: 1
GAD7 TOTAL SCORE: 9
3. WORRYING TOO MUCH ABOUT DIFFERENT THINGS: 1
6. BECOMING EASILY ANNOYED OR IRRITABLE: 2
5. BEING SO RESTLESS THAT IT IS HARD TO SIT STILL: 1
1. FEELING NERVOUS, ANXIOUS, OR ON EDGE: 2

## 2024-01-26 DIAGNOSIS — F33.0 MILD EPISODE OF RECURRENT MAJOR DEPRESSIVE DISORDER (HCC): ICD-10-CM

## 2024-01-26 RX ORDER — ARIPIPRAZOLE 5 MG/1
5 TABLET ORAL DAILY
Qty: 90 TABLET | Refills: 1 | OUTPATIENT
Start: 2024-01-26

## 2024-02-28 DIAGNOSIS — F33.0 MILD EPISODE OF RECURRENT MAJOR DEPRESSIVE DISORDER (HCC): ICD-10-CM

## 2024-02-29 RX ORDER — ARIPIPRAZOLE 5 MG/1
5 TABLET ORAL DAILY
Qty: 90 TABLET | Refills: 1 | Status: SHIPPED | OUTPATIENT
Start: 2024-02-29

## 2024-03-04 NOTE — PROGRESS NOTES
PSYCHIATRY PROGRESS NOTE    Macho Forde  1995 03/06/2024  Face to Face time: 30 minutes  PCP: Val López MD    CC:   Chief Complaint   Patient presents with    Follow-up       Patient is a 29 y.o. male with significant past medical history of chronic headache, chronic back pain, hypertension, type 2 diabetes, and obstructive sleep apnea who presents to the psychiatry clinic today for evaluation and management of depression and anxiety.        A:  Patient's presentation today is indicative of relative stability of his underlying mental health conditions.  We will continue to follow and provide him with ongoing support.    Diagnosis:  Binge eating disorder  Major depressive disorder, recurrent, mild  Generalized anxiety disorder with panic  Social anxiety disorder, severity unspecified  Tourette syndrome  Autism spectrum disorder    P:   1.  No changes to current medication regimen.  Patient will continue with aripiprazole 2.5 mg daily, fluoxetine 60 mg daily, and bupropion  mg daily.  2.  Patient was provided with additional resources including psychotherapy support    Medication Monitoring:    - PDMP reviewed: No current prescriptions     Follow-up: 6 weeks    Safety: Pt was counseled on the potential for increased suicidal ideations and advised on potential options for dealing with these including hotlines, calling the office, or going to the nearest emergency room.      __________________________________________________________________________    S:   Patient identified that he was really interested in potentially finding a psychotherapy that might work well with him.  He noted that he is struggling a lot from burnout, states that this is impacting his ability to do things in particular.  He states that he has basically \"given up on my thesis\" because he does not find the same level of interest in completing the task anymore.  He states that he is significantly struggling with accountability,

## 2024-03-06 ENCOUNTER — OFFICE VISIT (OUTPATIENT)
Dept: PSYCHIATRY | Age: 29
End: 2024-03-06
Payer: COMMERCIAL

## 2024-03-06 VITALS
OXYGEN SATURATION: 97 % | WEIGHT: 240 LBS | HEIGHT: 70 IN | DIASTOLIC BLOOD PRESSURE: 82 MMHG | SYSTOLIC BLOOD PRESSURE: 126 MMHG | BODY MASS INDEX: 34.36 KG/M2 | HEART RATE: 97 BPM

## 2024-03-06 DIAGNOSIS — F33.0 MILD EPISODE OF RECURRENT MAJOR DEPRESSIVE DISORDER (HCC): ICD-10-CM

## 2024-03-06 DIAGNOSIS — F95.2 TOURETTE SYNDROME: ICD-10-CM

## 2024-03-06 DIAGNOSIS — F41.1 GENERALIZED ANXIETY DISORDER WITH PANIC ATTACKS: Chronic | ICD-10-CM

## 2024-03-06 DIAGNOSIS — F84.0 AUTISM SPECTRUM DISORDER: ICD-10-CM

## 2024-03-06 DIAGNOSIS — F40.10 SOCIAL ANXIETY DISORDER: ICD-10-CM

## 2024-03-06 DIAGNOSIS — F50.81 BINGE EATING DISORDER: Primary | ICD-10-CM

## 2024-03-06 DIAGNOSIS — F41.0 GENERALIZED ANXIETY DISORDER WITH PANIC ATTACKS: Chronic | ICD-10-CM

## 2024-03-06 PROCEDURE — 3079F DIAST BP 80-89 MM HG: CPT | Performed by: STUDENT IN AN ORGANIZED HEALTH CARE EDUCATION/TRAINING PROGRAM

## 2024-03-06 PROCEDURE — 99214 OFFICE O/P EST MOD 30 MIN: CPT | Performed by: STUDENT IN AN ORGANIZED HEALTH CARE EDUCATION/TRAINING PROGRAM

## 2024-03-06 PROCEDURE — 3074F SYST BP LT 130 MM HG: CPT | Performed by: STUDENT IN AN ORGANIZED HEALTH CARE EDUCATION/TRAINING PROGRAM

## 2024-03-06 ASSESSMENT — ANXIETY QUESTIONNAIRES
4. TROUBLE RELAXING: 2
1. FEELING NERVOUS, ANXIOUS, OR ON EDGE: 1
5. BEING SO RESTLESS THAT IT IS HARD TO SIT STILL: 2
2. NOT BEING ABLE TO STOP OR CONTROL WORRYING: 1
3. WORRYING TOO MUCH ABOUT DIFFERENT THINGS: 1
6. BECOMING EASILY ANNOYED OR IRRITABLE: 2
IF YOU CHECKED OFF ANY PROBLEMS ON THIS QUESTIONNAIRE, HOW DIFFICULT HAVE THESE PROBLEMS MADE IT FOR YOU TO DO YOUR WORK, TAKE CARE OF THINGS AT HOME, OR GET ALONG WITH OTHER PEOPLE: SOMEWHAT DIFFICULT
GAD7 TOTAL SCORE: 10
7. FEELING AFRAID AS IF SOMETHING AWFUL MIGHT HAPPEN: 1

## 2024-03-06 ASSESSMENT — PATIENT HEALTH QUESTIONNAIRE - PHQ9
5. POOR APPETITE OR OVEREATING: 1
2. FEELING DOWN, DEPRESSED OR HOPELESS: 1
1. LITTLE INTEREST OR PLEASURE IN DOING THINGS: 2
9. THOUGHTS THAT YOU WOULD BE BETTER OFF DEAD, OR OF HURTING YOURSELF: 0
7. TROUBLE CONCENTRATING ON THINGS, SUCH AS READING THE NEWSPAPER OR WATCHING TELEVISION: 1
3. TROUBLE FALLING OR STAYING ASLEEP: 2
10. IF YOU CHECKED OFF ANY PROBLEMS, HOW DIFFICULT HAVE THESE PROBLEMS MADE IT FOR YOU TO DO YOUR WORK, TAKE CARE OF THINGS AT HOME, OR GET ALONG WITH OTHER PEOPLE: 2
6. FEELING BAD ABOUT YOURSELF - OR THAT YOU ARE A FAILURE OR HAVE LET YOURSELF OR YOUR FAMILY DOWN: 1
SUM OF ALL RESPONSES TO PHQ QUESTIONS 1-9: 10
SUM OF ALL RESPONSES TO PHQ9 QUESTIONS 1 & 2: 3
SUM OF ALL RESPONSES TO PHQ QUESTIONS 1-9: 10
SUM OF ALL RESPONSES TO PHQ QUESTIONS 1-9: 10
8. MOVING OR SPEAKING SO SLOWLY THAT OTHER PEOPLE COULD HAVE NOTICED. OR THE OPPOSITE, BEING SO FIGETY OR RESTLESS THAT YOU HAVE BEEN MOVING AROUND A LOT MORE THAN USUAL: 0
SUM OF ALL RESPONSES TO PHQ QUESTIONS 1-9: 10
4. FEELING TIRED OR HAVING LITTLE ENERGY: 2

## 2024-03-06 ASSESSMENT — ENCOUNTER SYMPTOMS
ALLERGIC/IMMUNOLOGIC NEGATIVE: 1
EYES NEGATIVE: 1
GASTROINTESTINAL NEGATIVE: 1
RESPIRATORY NEGATIVE: 1

## 2024-03-06 NOTE — PATIENT INSTRUCTIONS
[email or web address]  Click \"For Adults\"  Click \"Living with ADHD: A Lifespan Disorder\" on the left side column menu  Particularly focus on the section regarding time management and task organization      Therapy:  [email or web address], sort by your insurance as well as pertinent issues to you

## 2024-03-21 ENCOUNTER — OFFICE VISIT (OUTPATIENT)
Dept: INTERNAL MEDICINE CLINIC | Age: 29
End: 2024-03-21
Payer: COMMERCIAL

## 2024-03-21 VITALS
OXYGEN SATURATION: 97 % | TEMPERATURE: 98.1 F | RESPIRATION RATE: 18 BRPM | BODY MASS INDEX: 32.97 KG/M2 | SYSTOLIC BLOOD PRESSURE: 124 MMHG | DIASTOLIC BLOOD PRESSURE: 80 MMHG | HEART RATE: 87 BPM | WEIGHT: 229.8 LBS

## 2024-03-21 DIAGNOSIS — R10.13 EPIGASTRIC PAIN: ICD-10-CM

## 2024-03-21 DIAGNOSIS — M79.661 BILATERAL CALF PAIN: ICD-10-CM

## 2024-03-21 DIAGNOSIS — R10.13 DYSPEPSIA: ICD-10-CM

## 2024-03-21 DIAGNOSIS — Z13.220 ENCOUNTER FOR LIPID SCREENING FOR CARDIOVASCULAR DISEASE: ICD-10-CM

## 2024-03-21 DIAGNOSIS — E11.9 DIABETES MELLITUS TYPE 2, INSULIN DEPENDENT (HCC): Primary | ICD-10-CM

## 2024-03-21 DIAGNOSIS — Z13.6 ENCOUNTER FOR LIPID SCREENING FOR CARDIOVASCULAR DISEASE: ICD-10-CM

## 2024-03-21 DIAGNOSIS — R11.2 NAUSEA AND VOMITING, UNSPECIFIED VOMITING TYPE: ICD-10-CM

## 2024-03-21 DIAGNOSIS — M79.662 BILATERAL CALF PAIN: ICD-10-CM

## 2024-03-21 DIAGNOSIS — Z23 NEED FOR VACCINATION: ICD-10-CM

## 2024-03-21 DIAGNOSIS — Z79.4 DIABETES MELLITUS TYPE 2, INSULIN DEPENDENT (HCC): Primary | ICD-10-CM

## 2024-03-21 DIAGNOSIS — Z79.4 DIABETES MELLITUS TYPE 2, INSULIN DEPENDENT (HCC): ICD-10-CM

## 2024-03-21 DIAGNOSIS — E11.9 DIABETES MELLITUS TYPE 2, INSULIN DEPENDENT (HCC): ICD-10-CM

## 2024-03-21 DIAGNOSIS — Z13.29 SCREENING FOR THYROID DISORDER: ICD-10-CM

## 2024-03-21 DIAGNOSIS — R06.2 WHEEZING: ICD-10-CM

## 2024-03-21 LAB
ALBUMIN SERPL-MCNC: 4.6 G/DL (ref 3.4–5)
ALBUMIN/GLOB SERPL: 1.7 {RATIO} (ref 1.1–2.2)
ALP SERPL-CCNC: 37 U/L (ref 40–129)
ALT SERPL-CCNC: 84 U/L (ref 10–40)
ANION GAP SERPL CALCULATED.3IONS-SCNC: 13 MMOL/L (ref 3–16)
AST SERPL-CCNC: 37 U/L (ref 15–37)
BASOPHILS # BLD: 0.1 K/UL (ref 0–0.2)
BASOPHILS NFR BLD: 0.9 %
BILIRUB SERPL-MCNC: 0.4 MG/DL (ref 0–1)
BUN SERPL-MCNC: 13 MG/DL (ref 7–20)
CALCIUM SERPL-MCNC: 9.5 MG/DL (ref 8.3–10.6)
CHLORIDE SERPL-SCNC: 98 MMOL/L (ref 99–110)
CHOLEST SERPL-MCNC: 168 MG/DL (ref 0–199)
CO2 SERPL-SCNC: 25 MMOL/L (ref 21–32)
CREAT SERPL-MCNC: 1 MG/DL (ref 0.9–1.3)
DEPRECATED RDW RBC AUTO: 13.5 % (ref 12.4–15.4)
EOSINOPHIL # BLD: 1.2 K/UL (ref 0–0.6)
EOSINOPHIL NFR BLD: 14.4 %
GFR SERPLBLD CREATININE-BSD FMLA CKD-EPI: >60 ML/MIN/{1.73_M2}
GLUCOSE SERPL-MCNC: 148 MG/DL (ref 70–99)
HBA1C MFR BLD: 7.4 %
HCT VFR BLD AUTO: 41.1 % (ref 40.5–52.5)
HDLC SERPL-MCNC: 45 MG/DL (ref 40–60)
HGB BLD-MCNC: 14.6 G/DL (ref 13.5–17.5)
LDLC SERPL CALC-MCNC: 98 MG/DL
LIPASE SERPL-CCNC: 47 U/L (ref 13–60)
LYMPHOCYTES # BLD: 2.3 K/UL (ref 1–5.1)
LYMPHOCYTES NFR BLD: 26.5 %
MCH RBC QN AUTO: 30.4 PG (ref 26–34)
MCHC RBC AUTO-ENTMCNC: 35.4 G/DL (ref 31–36)
MCV RBC AUTO: 85.8 FL (ref 80–100)
MONOCYTES # BLD: 0.5 K/UL (ref 0–1.3)
MONOCYTES NFR BLD: 5.9 %
NEUTROPHILS # BLD: 4.4 K/UL (ref 1.7–7.7)
NEUTROPHILS NFR BLD: 52.3 %
PLATELET # BLD AUTO: 298 K/UL (ref 135–450)
PMV BLD AUTO: 9.6 FL (ref 5–10.5)
POTASSIUM SERPL-SCNC: 3.9 MMOL/L (ref 3.5–5.1)
PROT SERPL-MCNC: 7.3 G/DL (ref 6.4–8.2)
RBC # BLD AUTO: 4.79 M/UL (ref 4.2–5.9)
SODIUM SERPL-SCNC: 136 MMOL/L (ref 136–145)
TRIGL SERPL-MCNC: 126 MG/DL (ref 0–150)
TSH SERPL DL<=0.005 MIU/L-ACNC: 0.99 UIU/ML (ref 0.27–4.2)
VLDLC SERPL CALC-MCNC: 25 MG/DL
WBC # BLD AUTO: 8.5 K/UL (ref 4–11)

## 2024-03-21 PROCEDURE — 3079F DIAST BP 80-89 MM HG: CPT | Performed by: INTERNAL MEDICINE

## 2024-03-21 PROCEDURE — 83036 HEMOGLOBIN GLYCOSYLATED A1C: CPT | Performed by: INTERNAL MEDICINE

## 2024-03-21 PROCEDURE — 3074F SYST BP LT 130 MM HG: CPT | Performed by: INTERNAL MEDICINE

## 2024-03-21 PROCEDURE — 99214 OFFICE O/P EST MOD 30 MIN: CPT | Performed by: INTERNAL MEDICINE

## 2024-03-21 PROCEDURE — 90677 PCV20 VACCINE IM: CPT | Performed by: INTERNAL MEDICINE

## 2024-03-21 PROCEDURE — 90471 IMMUNIZATION ADMIN: CPT | Performed by: INTERNAL MEDICINE

## 2024-03-21 PROCEDURE — 3051F HG A1C>EQUAL 7.0%<8.0%: CPT | Performed by: INTERNAL MEDICINE

## 2024-03-21 RX ORDER — ALBUTEROL SULFATE 90 UG/1
2 AEROSOL, METERED RESPIRATORY (INHALATION) 4 TIMES DAILY PRN
Qty: 18 G | Refills: 5 | Status: SHIPPED | OUTPATIENT
Start: 2024-03-21

## 2024-03-21 RX ORDER — OMEPRAZOLE 40 MG/1
40 CAPSULE, DELAYED RELEASE ORAL 2 TIMES DAILY
Qty: 60 CAPSULE | Refills: 5 | Status: SHIPPED | OUTPATIENT
Start: 2024-03-21

## 2024-03-21 NOTE — PROGRESS NOTES
FABY BILATERAL LIMITED 1-2 LEVELS; Future  9. Need for vaccination  -     Pneumococcal, PCV20, PREVNAR 20, (age 6w+), IM, PF      No follow-ups on file.    SUBJECTIVE/OBJECTIVE:  HPI    Last night BG was 134.   Has not been able to go up to 3 metformin. Gives diarrhea.     He talked to Dr. Reyes - taking 2.5mg of abilify instead of 5mg because of     Last Saturday had severe stomach pain (almost went to ER). Spit out some blood when retching. Still taking omeprazole.     Possible asthma history - now wheezing with cleaning    Bilateral calf pain with walking down street for about         Review of Systems    Current Outpatient Medications on File Prior to Visit   Medication Sig Dispense Refill    ARIPiprazole (ABILIFY) 5 MG tablet TAKE 1 TABLET BY MOUTH DAILY (Patient taking differently: Take 0.5 tablets by mouth daily) 90 tablet 1    FLUoxetine (PROZAC) 40 MG capsule Take with the 20mg dosage daily to make a 60mg dosage 90 capsule 1    buPROPion (WELLBUTRIN XL) 300 MG extended release tablet TAKE ONE TABLET BY MOUTH EVERY MORNING 90 tablet 1    buPROPion (WELLBUTRIN XL) 150 MG extended release tablet TAKE ONE TABLET BY MOUTH EVERY MORNING IN ADDITION TO THE 300MG DOSE FOR A TOTAL OF 450MG DAILY 90 tablet 1    Insulin Pen Needle ([employer]ER PEN NEEDLES) 31G X 6 MM MISC 1 each by Does not apply route 5 times daily May substitute brand based on insurance formulary 100 each 3    metFORMIN (GLUCOPHAGE-XR) 500 MG extended release tablet Take 1 tablet by mouth daily (with breakfast) 360 tablet 3    insulin glargine (LANTUS SOLOSTAR) 100 UNIT/ML injection pen Inject 20 Units into the skin nightly 5 Adjustable Dose Pre-filled Pen Syringe 5    insulin aspart (NOVOLOG FLEXPEN) 100 UNIT/ML injection pen Inject 5 Units into the skin 3 times daily (before meals) 5 Adjustable Dose Pre-filled Pen Syringe 5    blood glucose monitor strips Test 4 times a day & as needed for symptoms of irregular blood glucose. Dispense sufficient

## 2024-03-21 NOTE — ASSESSMENT & PLAN NOTE
Back on insulin lantus 20 units nightly and lispro 5 units TIDC given last A1C of 10.3. Much improvement. Currently taking 1000mg metformin. Not tolerating much more. Dr. Reyes did drop his abilify down to 2.5mg daily as well in the meantime. Will make no changes. Continue increased exercise and can play with insulin dosing of mealtime to get better control. Consider GLP-1 agonist but cost likely to be an issue.   Hemoglobin A1C (%)   Date Value   03/21/2024 7.4   12/21/2023 10.3   08/21/2023 7.0       recommend eye exam.

## 2024-04-15 NOTE — PROGRESS NOTES
restless that it is hard to sit still More than half the days More than half the days   Becoming easily annoyed or irritable Several days More than half the days   Feeling afraid as if something awful might happen Several days Several days   FAREED-7 Total Score 8 10   How difficult have these problems made it for you to do your work, take care of things at home, or get along with other people? Somewhat difficult Somewhat difficult        Labs:     Orders Only on 03/21/2024   Component Date Value Ref Range Status    Lipase 03/21/2024 47.0  13.0 - 60.0 U/L Final    TSH 03/21/2024 0.99  0.27 - 4.20 uIU/mL Final    Cholesterol, Total 03/21/2024 168  0 - 199 mg/dL Final    Triglycerides 03/21/2024 126  0 - 150 mg/dL Final    HDL 03/21/2024 45  40 - 60 mg/dL Final    Comment: An HDL cholesterol less than 40 mg/dL is low and  constitutes a coronary heart disease risk factor.  An HDL cholesterol greater than 60 mg/dL is a  negative risk factor for coronary heart disease.      LDL Calculated 03/21/2024 98  <100 mg/dL Final    VLDL Cholesterol Calculated 03/21/2024 25  Not Established mg/dL Final    Sodium 03/21/2024 136  136 - 145 mmol/L Final    Potassium 03/21/2024 3.9  3.5 - 5.1 mmol/L Final    Chloride 03/21/2024 98 (L)  99 - 110 mmol/L Final    CO2 03/21/2024 25  21 - 32 mmol/L Final    Anion Gap 03/21/2024 13  3 - 16 Final    Glucose 03/21/2024 148 (H)  70 - 99 mg/dL Final    BUN 03/21/2024 13  7 - 20 mg/dL Final    Creatinine 03/21/2024 1.0  0.9 - 1.3 mg/dL Final    Est, Glom Filt Rate 03/21/2024 >60  >60 Final    Comment: Pediatric calculator link  https://www.kidney.org/professionals/kdoqi/gfr_calculatorped  Effective Oct 3, 2022  These results are not intended for use in patients  <18 years of age.  eGFR results are calculated without  a race factor using the 2021 CKD-EPI equation.  Careful  clinical correlation is recommended, particularly when  comparing to results calculated using previous equations.  The CKD-EPI

## 2024-04-17 ENCOUNTER — OFFICE VISIT (OUTPATIENT)
Dept: PSYCHIATRY | Age: 29
End: 2024-04-17
Payer: COMMERCIAL

## 2024-04-17 VITALS
SYSTOLIC BLOOD PRESSURE: 150 MMHG | WEIGHT: 233.6 LBS | DIASTOLIC BLOOD PRESSURE: 86 MMHG | RESPIRATION RATE: 12 BRPM | BODY MASS INDEX: 33.52 KG/M2 | HEART RATE: 70 BPM

## 2024-04-17 DIAGNOSIS — F33.0 MILD EPISODE OF RECURRENT MAJOR DEPRESSIVE DISORDER (HCC): ICD-10-CM

## 2024-04-17 DIAGNOSIS — F41.0 GENERALIZED ANXIETY DISORDER WITH PANIC ATTACKS: ICD-10-CM

## 2024-04-17 DIAGNOSIS — F41.1 GENERALIZED ANXIETY DISORDER WITH PANIC ATTACKS: ICD-10-CM

## 2024-04-17 PROCEDURE — 3077F SYST BP >= 140 MM HG: CPT | Performed by: STUDENT IN AN ORGANIZED HEALTH CARE EDUCATION/TRAINING PROGRAM

## 2024-04-17 PROCEDURE — 3079F DIAST BP 80-89 MM HG: CPT | Performed by: STUDENT IN AN ORGANIZED HEALTH CARE EDUCATION/TRAINING PROGRAM

## 2024-04-17 PROCEDURE — 99214 OFFICE O/P EST MOD 30 MIN: CPT | Performed by: STUDENT IN AN ORGANIZED HEALTH CARE EDUCATION/TRAINING PROGRAM

## 2024-04-17 RX ORDER — BUPROPION HYDROCHLORIDE 150 MG/1
TABLET ORAL
Qty: 30 TABLET | Refills: 3 | Status: SHIPPED | OUTPATIENT
Start: 2024-04-17

## 2024-04-17 RX ORDER — ARIPIPRAZOLE 5 MG/1
5 TABLET ORAL DAILY
Qty: 30 TABLET | Refills: 3 | Status: SHIPPED | OUTPATIENT
Start: 2024-04-17

## 2024-04-17 RX ORDER — FLUOXETINE HYDROCHLORIDE 20 MG/1
20 CAPSULE ORAL DAILY
Qty: 30 CAPSULE | Refills: 3 | Status: SHIPPED | OUTPATIENT
Start: 2024-04-17

## 2024-04-17 RX ORDER — FLUOXETINE HYDROCHLORIDE 40 MG/1
CAPSULE ORAL
Qty: 30 CAPSULE | Refills: 3 | Status: SHIPPED | OUTPATIENT
Start: 2024-04-17

## 2024-04-17 RX ORDER — BUPROPION HYDROCHLORIDE 300 MG/1
300 TABLET ORAL EVERY MORNING
Qty: 30 TABLET | Refills: 3 | Status: SHIPPED | OUTPATIENT
Start: 2024-04-17

## 2024-04-17 ASSESSMENT — PATIENT HEALTH QUESTIONNAIRE - PHQ9
SUM OF ALL RESPONSES TO PHQ QUESTIONS 1-9: 10
1. LITTLE INTEREST OR PLEASURE IN DOING THINGS: SEVERAL DAYS
SUM OF ALL RESPONSES TO PHQ9 QUESTIONS 1 & 2: 2
SUM OF ALL RESPONSES TO PHQ QUESTIONS 1-9: 10
3. TROUBLE FALLING OR STAYING ASLEEP: MORE THAN HALF THE DAYS
4. FEELING TIRED OR HAVING LITTLE ENERGY: MORE THAN HALF THE DAYS
9. THOUGHTS THAT YOU WOULD BE BETTER OFF DEAD, OR OF HURTING YOURSELF: NOT AT ALL
6. FEELING BAD ABOUT YOURSELF - OR THAT YOU ARE A FAILURE OR HAVE LET YOURSELF OR YOUR FAMILY DOWN: SEVERAL DAYS
SUM OF ALL RESPONSES TO PHQ QUESTIONS 1-9: 10
2. FEELING DOWN, DEPRESSED OR HOPELESS: SEVERAL DAYS
SUM OF ALL RESPONSES TO PHQ QUESTIONS 1-9: 10
10. IF YOU CHECKED OFF ANY PROBLEMS, HOW DIFFICULT HAVE THESE PROBLEMS MADE IT FOR YOU TO DO YOUR WORK, TAKE CARE OF THINGS AT HOME, OR GET ALONG WITH OTHER PEOPLE: SOMEWHAT DIFFICULT
5. POOR APPETITE OR OVEREATING: MORE THAN HALF THE DAYS
8. MOVING OR SPEAKING SO SLOWLY THAT OTHER PEOPLE COULD HAVE NOTICED. OR THE OPPOSITE, BEING SO FIGETY OR RESTLESS THAT YOU HAVE BEEN MOVING AROUND A LOT MORE THAN USUAL: NOT AT ALL
7. TROUBLE CONCENTRATING ON THINGS, SUCH AS READING THE NEWSPAPER OR WATCHING TELEVISION: SEVERAL DAYS

## 2024-04-17 ASSESSMENT — ANXIETY QUESTIONNAIRES
2. NOT BEING ABLE TO STOP OR CONTROL WORRYING: SEVERAL DAYS
1. FEELING NERVOUS, ANXIOUS, OR ON EDGE: SEVERAL DAYS
4. TROUBLE RELAXING: SEVERAL DAYS
3. WORRYING TOO MUCH ABOUT DIFFERENT THINGS: SEVERAL DAYS
6. BECOMING EASILY ANNOYED OR IRRITABLE: SEVERAL DAYS
IF YOU CHECKED OFF ANY PROBLEMS ON THIS QUESTIONNAIRE, HOW DIFFICULT HAVE THESE PROBLEMS MADE IT FOR YOU TO DO YOUR WORK, TAKE CARE OF THINGS AT HOME, OR GET ALONG WITH OTHER PEOPLE: SOMEWHAT DIFFICULT
5. BEING SO RESTLESS THAT IT IS HARD TO SIT STILL: MORE THAN HALF THE DAYS
7. FEELING AFRAID AS IF SOMETHING AWFUL MIGHT HAPPEN: SEVERAL DAYS
GAD7 TOTAL SCORE: 8

## 2024-05-15 NOTE — PROGRESS NOTES
PSYCHIATRY PROGRESS NOTE    Macho Forde  1995 05/22/2024  Face to Face time: 30 minutes  PCP: Val López MD    CC:   Chief Complaint   Patient presents with    Follow-up       Patient is a 29 y.o. male with significant past medical history of chronic headache, chronic back pain, hypertension, type 2 diabetes, and obstructive sleep apnea who presents to the psychiatry clinic today for evaluation and management of depression and anxiety.    A:  Patient's presentation today is indicative of continued stability on the current medication regimen. While things are not perfect, they are stably imperfect, which is manageable at present.  We will continue to follow and provide support.    Diagnosis:  Binge eating disorder  Major depressive disorder, recurrent, mild  Generalized anxiety disorder with panic  Social anxiety disorder, severity unspecified  Tourette syndrome  Autism spectrum disorder    P:   No changes to current regimen.  Patient to continue aripiprazole 2.5mg daily, fluoxetine 60mg daily, and bupropion xl 450mg daily  Patient to monitor his glucose levels on the higher dose of metformin and evaluate for whether the aripiprazole might be able to go back to its previous dosage    Medication Monitoring:    - PDMP reviewed: No current prescriptions    Follow-up: 6 weeks    Safety: Pt was counseled on the potential for increased suicidal ideations and advised on potential options for dealing with these including hotlines, calling the office, or going to the nearest emergency room.      __________________________________________________________________________    S:   Patient noted that not much had changed since he was last seen.  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  He stated that he's doing a bit of oversleeping, but it's been mildly better since

## 2024-05-22 ENCOUNTER — OFFICE VISIT (OUTPATIENT)
Dept: PSYCHIATRY | Age: 29
End: 2024-05-22
Payer: COMMERCIAL

## 2024-05-22 VITALS
SYSTOLIC BLOOD PRESSURE: 122 MMHG | BODY MASS INDEX: 33.93 KG/M2 | DIASTOLIC BLOOD PRESSURE: 78 MMHG | WEIGHT: 237 LBS | HEIGHT: 70 IN

## 2024-05-22 DIAGNOSIS — F41.1 GENERALIZED ANXIETY DISORDER WITH PANIC ATTACKS: Chronic | ICD-10-CM

## 2024-05-22 DIAGNOSIS — F40.10 SOCIAL ANXIETY DISORDER: Primary | ICD-10-CM

## 2024-05-22 DIAGNOSIS — F41.0 GENERALIZED ANXIETY DISORDER WITH PANIC ATTACKS: Chronic | ICD-10-CM

## 2024-05-22 DIAGNOSIS — F33.0 MILD EPISODE OF RECURRENT MAJOR DEPRESSIVE DISORDER (HCC): ICD-10-CM

## 2024-05-22 DIAGNOSIS — F84.0 AUTISM SPECTRUM DISORDER: ICD-10-CM

## 2024-05-22 PROCEDURE — 3078F DIAST BP <80 MM HG: CPT | Performed by: STUDENT IN AN ORGANIZED HEALTH CARE EDUCATION/TRAINING PROGRAM

## 2024-05-22 PROCEDURE — 3074F SYST BP LT 130 MM HG: CPT | Performed by: STUDENT IN AN ORGANIZED HEALTH CARE EDUCATION/TRAINING PROGRAM

## 2024-05-22 PROCEDURE — 99213 OFFICE O/P EST LOW 20 MIN: CPT | Performed by: STUDENT IN AN ORGANIZED HEALTH CARE EDUCATION/TRAINING PROGRAM

## 2024-05-22 ASSESSMENT — ANXIETY QUESTIONNAIRES
IF YOU CHECKED OFF ANY PROBLEMS ON THIS QUESTIONNAIRE, HOW DIFFICULT HAVE THESE PROBLEMS MADE IT FOR YOU TO DO YOUR WORK, TAKE CARE OF THINGS AT HOME, OR GET ALONG WITH OTHER PEOPLE: SOMEWHAT DIFFICULT
5. BEING SO RESTLESS THAT IT IS HARD TO SIT STILL: SEVERAL DAYS
4. TROUBLE RELAXING: SEVERAL DAYS
2. NOT BEING ABLE TO STOP OR CONTROL WORRYING: SEVERAL DAYS
1. FEELING NERVOUS, ANXIOUS, OR ON EDGE: SEVERAL DAYS
6. BECOMING EASILY ANNOYED OR IRRITABLE: SEVERAL DAYS
GAD7 TOTAL SCORE: 7
7. FEELING AFRAID AS IF SOMETHING AWFUL MIGHT HAPPEN: SEVERAL DAYS

## 2024-05-22 ASSESSMENT — PATIENT HEALTH QUESTIONNAIRE - PHQ9
SUM OF ALL RESPONSES TO PHQ QUESTIONS 1-9: 9
1. LITTLE INTEREST OR PLEASURE IN DOING THINGS: SEVERAL DAYS
10. IF YOU CHECKED OFF ANY PROBLEMS, HOW DIFFICULT HAVE THESE PROBLEMS MADE IT FOR YOU TO DO YOUR WORK, TAKE CARE OF THINGS AT HOME, OR GET ALONG WITH OTHER PEOPLE: SOMEWHAT DIFFICULT
SUM OF ALL RESPONSES TO PHQ QUESTIONS 1-9: 9
7. TROUBLE CONCENTRATING ON THINGS, SUCH AS READING THE NEWSPAPER OR WATCHING TELEVISION: SEVERAL DAYS
9. THOUGHTS THAT YOU WOULD BE BETTER OFF DEAD, OR OF HURTING YOURSELF: NOT AT ALL
5. POOR APPETITE OR OVEREATING: MORE THAN HALF THE DAYS
6. FEELING BAD ABOUT YOURSELF - OR THAT YOU ARE A FAILURE OR HAVE LET YOURSELF OR YOUR FAMILY DOWN: NOT AT ALL
4. FEELING TIRED OR HAVING LITTLE ENERGY: MORE THAN HALF THE DAYS
SUM OF ALL RESPONSES TO PHQ QUESTIONS 1-9: 9
8. MOVING OR SPEAKING SO SLOWLY THAT OTHER PEOPLE COULD HAVE NOTICED. OR THE OPPOSITE, BEING SO FIGETY OR RESTLESS THAT YOU HAVE BEEN MOVING AROUND A LOT MORE THAN USUAL: NOT AT ALL
SUM OF ALL RESPONSES TO PHQ QUESTIONS 1-9: 9
3. TROUBLE FALLING OR STAYING ASLEEP: MORE THAN HALF THE DAYS
2. FEELING DOWN, DEPRESSED OR HOPELESS: SEVERAL DAYS
SUM OF ALL RESPONSES TO PHQ9 QUESTIONS 1 & 2: 2

## 2024-05-22 ASSESSMENT — ENCOUNTER SYMPTOMS
ALLERGIC/IMMUNOLOGIC NEGATIVE: 1
EYES NEGATIVE: 1
RESPIRATORY NEGATIVE: 1
GASTROINTESTINAL NEGATIVE: 1

## 2024-07-03 ENCOUNTER — OFFICE VISIT (OUTPATIENT)
Dept: PSYCHIATRY | Age: 29
End: 2024-07-03
Payer: COMMERCIAL

## 2024-07-03 VITALS
DIASTOLIC BLOOD PRESSURE: 88 MMHG | SYSTOLIC BLOOD PRESSURE: 142 MMHG | WEIGHT: 236 LBS | HEART RATE: 83 BPM | BODY MASS INDEX: 33.79 KG/M2 | HEIGHT: 70 IN | OXYGEN SATURATION: 98 %

## 2024-07-03 DIAGNOSIS — F41.0 GENERALIZED ANXIETY DISORDER WITH PANIC ATTACKS: ICD-10-CM

## 2024-07-03 DIAGNOSIS — F33.0 MILD EPISODE OF RECURRENT MAJOR DEPRESSIVE DISORDER (HCC): ICD-10-CM

## 2024-07-03 DIAGNOSIS — F41.1 GENERALIZED ANXIETY DISORDER WITH PANIC ATTACKS: ICD-10-CM

## 2024-07-03 PROCEDURE — 3077F SYST BP >= 140 MM HG: CPT | Performed by: STUDENT IN AN ORGANIZED HEALTH CARE EDUCATION/TRAINING PROGRAM

## 2024-07-03 PROCEDURE — 99214 OFFICE O/P EST MOD 30 MIN: CPT | Performed by: STUDENT IN AN ORGANIZED HEALTH CARE EDUCATION/TRAINING PROGRAM

## 2024-07-03 PROCEDURE — 3079F DIAST BP 80-89 MM HG: CPT | Performed by: STUDENT IN AN ORGANIZED HEALTH CARE EDUCATION/TRAINING PROGRAM

## 2024-07-03 RX ORDER — ARIPIPRAZOLE 5 MG/1
5 TABLET ORAL DAILY
Qty: 30 TABLET | Refills: 3 | Status: SHIPPED | OUTPATIENT
Start: 2024-07-03

## 2024-07-03 RX ORDER — BUPROPION HYDROCHLORIDE 150 MG/1
TABLET ORAL
Qty: 30 TABLET | Refills: 3 | Status: SHIPPED | OUTPATIENT
Start: 2024-07-03

## 2024-07-03 RX ORDER — BUPROPION HYDROCHLORIDE 300 MG/1
300 TABLET ORAL EVERY MORNING
Qty: 30 TABLET | Refills: 3 | Status: SHIPPED | OUTPATIENT
Start: 2024-07-03

## 2024-07-03 RX ORDER — FLUOXETINE HYDROCHLORIDE 20 MG/1
20 CAPSULE ORAL DAILY
Qty: 30 CAPSULE | Refills: 3 | Status: SHIPPED | OUTPATIENT
Start: 2024-07-03

## 2024-07-03 RX ORDER — FLUOXETINE HYDROCHLORIDE 40 MG/1
CAPSULE ORAL
Qty: 30 CAPSULE | Refills: 3 | Status: SHIPPED | OUTPATIENT
Start: 2024-07-03

## 2024-07-03 ASSESSMENT — ANXIETY QUESTIONNAIRES
2. NOT BEING ABLE TO STOP OR CONTROL WORRYING: MORE THAN HALF THE DAYS
6. BECOMING EASILY ANNOYED OR IRRITABLE: MORE THAN HALF THE DAYS
3. WORRYING TOO MUCH ABOUT DIFFERENT THINGS: MORE THAN HALF THE DAYS
7. FEELING AFRAID AS IF SOMETHING AWFUL MIGHT HAPPEN: SEVERAL DAYS
GAD7 TOTAL SCORE: 12
1. FEELING NERVOUS, ANXIOUS, OR ON EDGE: MORE THAN HALF THE DAYS
IF YOU CHECKED OFF ANY PROBLEMS ON THIS QUESTIONNAIRE, HOW DIFFICULT HAVE THESE PROBLEMS MADE IT FOR YOU TO DO YOUR WORK, TAKE CARE OF THINGS AT HOME, OR GET ALONG WITH OTHER PEOPLE: SOMEWHAT DIFFICULT
4. TROUBLE RELAXING: MORE THAN HALF THE DAYS
5. BEING SO RESTLESS THAT IT IS HARD TO SIT STILL: SEVERAL DAYS

## 2024-07-03 ASSESSMENT — PATIENT HEALTH QUESTIONNAIRE - PHQ9
8. MOVING OR SPEAKING SO SLOWLY THAT OTHER PEOPLE COULD HAVE NOTICED. OR THE OPPOSITE, BEING SO FIGETY OR RESTLESS THAT YOU HAVE BEEN MOVING AROUND A LOT MORE THAN USUAL: NOT AT ALL
SUM OF ALL RESPONSES TO PHQ QUESTIONS 1-9: 13
2. FEELING DOWN, DEPRESSED OR HOPELESS: MORE THAN HALF THE DAYS
9. THOUGHTS THAT YOU WOULD BE BETTER OFF DEAD, OR OF HURTING YOURSELF: NOT AT ALL
6. FEELING BAD ABOUT YOURSELF - OR THAT YOU ARE A FAILURE OR HAVE LET YOURSELF OR YOUR FAMILY DOWN: SEVERAL DAYS
5. POOR APPETITE OR OVEREATING: MORE THAN HALF THE DAYS
SUM OF ALL RESPONSES TO PHQ QUESTIONS 1-9: 13
SUM OF ALL RESPONSES TO PHQ9 QUESTIONS 1 & 2: 4
7. TROUBLE CONCENTRATING ON THINGS, SUCH AS READING THE NEWSPAPER OR WATCHING TELEVISION: SEVERAL DAYS
SUM OF ALL RESPONSES TO PHQ QUESTIONS 1-9: 13
3. TROUBLE FALLING OR STAYING ASLEEP: MORE THAN HALF THE DAYS
10. IF YOU CHECKED OFF ANY PROBLEMS, HOW DIFFICULT HAVE THESE PROBLEMS MADE IT FOR YOU TO DO YOUR WORK, TAKE CARE OF THINGS AT HOME, OR GET ALONG WITH OTHER PEOPLE: SOMEWHAT DIFFICULT
4. FEELING TIRED OR HAVING LITTLE ENERGY: NEARLY EVERY DAY
1. LITTLE INTEREST OR PLEASURE IN DOING THINGS: MORE THAN HALF THE DAYS
SUM OF ALL RESPONSES TO PHQ QUESTIONS 1-9: 13

## 2024-07-03 NOTE — PROGRESS NOTES
15.4 % Final    Platelets 03/21/2024 298  135 - 450 K/uL Final    MPV 03/21/2024 9.6  5.0 - 10.5 fL Final    Neutrophils % 03/21/2024 52.3  % Final    Lymphocytes % 03/21/2024 26.5  % Final    Monocytes % 03/21/2024 5.9  % Final    Eosinophils % 03/21/2024 14.4  % Final    Basophils % 03/21/2024 0.9  % Final    Neutrophils Absolute 03/21/2024 4.4  1.7 - 7.7 K/uL Final    Lymphocytes Absolute 03/21/2024 2.3  1.0 - 5.1 K/uL Final    Monocytes Absolute 03/21/2024 0.5  0.0 - 1.3 K/uL Final    Eosinophils Absolute 03/21/2024 1.2 (H)  0.0 - 0.6 K/uL Final    Basophils Absolute 03/21/2024 0.1  0.0 - 0.2 K/uL Final       EKG: 10/14/2021 QTc 400         Red eRyes MD  Psychiatrist

## 2024-07-03 NOTE — PATIENT INSTRUCTIONS
[facility] for a therapist or a psychiatrist in the future.  Allows you to filter your results based on your area code as well as your price point and insurance.

## 2024-10-30 ENCOUNTER — OFFICE VISIT (OUTPATIENT)
Dept: INTERNAL MEDICINE CLINIC | Age: 29
End: 2024-10-30
Payer: COMMERCIAL

## 2024-10-30 VITALS
BODY MASS INDEX: 32.5 KG/M2 | DIASTOLIC BLOOD PRESSURE: 88 MMHG | WEIGHT: 227 LBS | SYSTOLIC BLOOD PRESSURE: 136 MMHG | HEART RATE: 81 BPM | OXYGEN SATURATION: 98 % | HEIGHT: 70 IN

## 2024-10-30 DIAGNOSIS — I10 ESSENTIAL HYPERTENSION: ICD-10-CM

## 2024-10-30 DIAGNOSIS — F41.1 GENERALIZED ANXIETY DISORDER WITH PANIC ATTACKS: ICD-10-CM

## 2024-10-30 DIAGNOSIS — F33.1 MODERATE EPISODE OF RECURRENT MAJOR DEPRESSIVE DISORDER (HCC): ICD-10-CM

## 2024-10-30 DIAGNOSIS — R10.13 DYSPEPSIA: ICD-10-CM

## 2024-10-30 DIAGNOSIS — Z79.4 TYPE 2 DIABETES MELLITUS WITH HYPERGLYCEMIA, WITH LONG-TERM CURRENT USE OF INSULIN (HCC): ICD-10-CM

## 2024-10-30 DIAGNOSIS — E11.65 TYPE 2 DIABETES MELLITUS WITH HYPERGLYCEMIA, WITH LONG-TERM CURRENT USE OF INSULIN (HCC): ICD-10-CM

## 2024-10-30 DIAGNOSIS — Z79.4 TYPE 2 DIABETES MELLITUS WITH HYPERGLYCEMIA, WITH LONG-TERM CURRENT USE OF INSULIN (HCC): Primary | ICD-10-CM

## 2024-10-30 DIAGNOSIS — F41.0 GENERALIZED ANXIETY DISORDER WITH PANIC ATTACKS: ICD-10-CM

## 2024-10-30 DIAGNOSIS — E11.65 TYPE 2 DIABETES MELLITUS WITH HYPERGLYCEMIA, WITH LONG-TERM CURRENT USE OF INSULIN (HCC): Primary | ICD-10-CM

## 2024-10-30 PROCEDURE — 99215 OFFICE O/P EST HI 40 MIN: CPT | Performed by: INTERNAL MEDICINE

## 2024-10-30 PROCEDURE — 3051F HG A1C>EQUAL 7.0%<8.0%: CPT | Performed by: INTERNAL MEDICINE

## 2024-10-30 PROCEDURE — 3075F SYST BP GE 130 - 139MM HG: CPT | Performed by: INTERNAL MEDICINE

## 2024-10-30 PROCEDURE — 3079F DIAST BP 80-89 MM HG: CPT | Performed by: INTERNAL MEDICINE

## 2024-10-30 RX ORDER — DULAGLUTIDE 0.75 MG/.5ML
0.75 INJECTION, SOLUTION SUBCUTANEOUS WEEKLY
Qty: 2 ML | Refills: 1 | Status: SHIPPED | OUTPATIENT
Start: 2024-10-30 | End: 2024-12-25

## 2024-10-30 RX ORDER — BLOOD-GLUCOSE SENSOR
EACH MISCELLANEOUS
Qty: 2 EACH | Refills: 3 | Status: SHIPPED | OUTPATIENT
Start: 2024-10-30

## 2024-10-30 ASSESSMENT — ENCOUNTER SYMPTOMS
NAUSEA: 0
VOMITING: 0
SHORTNESS OF BREATH: 0
SORE THROAT: 0
BLOOD IN STOOL: 0
COUGH: 0
ABDOMINAL PAIN: 0

## 2024-10-30 NOTE — PROGRESS NOTES
diabetic treatment includes insulin injections and oral agent (dual therapy). He is compliant with treatment all of the time. His home blood glucose trend is increasing steadily. His breakfast blood glucose range is generally >200 mg/dl. An ACE inhibitor/angiotensin II receptor blocker is being taken.       Review of Systems   Constitutional:  Negative for fatigue and fever.   HENT:  Negative for nosebleeds and sore throat.    Respiratory:  Negative for cough and shortness of breath.    Cardiovascular:  Negative for chest pain, palpitations and leg swelling.   Gastrointestinal:  Negative for abdominal pain, blood in stool, nausea and vomiting.   Neurological:  Negative for dizziness and weakness.          Objective   Physical Exam  Constitutional:       Appearance: Normal appearance.   HENT:      Head: Normocephalic and atraumatic.   Eyes:      General: No scleral icterus.     Conjunctiva/sclera: Conjunctivae normal.   Cardiovascular:      Rate and Rhythm: Normal rate and regular rhythm.      Pulses: Normal pulses.      Heart sounds: Normal heart sounds.   Pulmonary:      Effort: Pulmonary effort is normal.      Breath sounds: Normal breath sounds.   Musculoskeletal:         General: No swelling.   Skin:     General: Skin is warm and dry.   Neurological:      Mental Status: He is alert and oriented to person, place, and time. Mental status is at baseline.   Psychiatric:         Mood and Affect: Mood normal.         Behavior: Behavior normal.          During this visit I have spent 40 minutes reviewing previous notes, test results, medications and to perform face to face encounter with the patient obtaining history, performing physical exam and discussing the diagnosis, lab results, medications and importance of compliance with the treatment plan as well as to complete documentation in electronic health records.      An electronic signature was used to authenticate this note.    --Emelyn Arias MD

## 2024-10-30 NOTE — PATIENT INSTRUCTIONS
Increase insulin lantus to 25 units daily and aspart to 6 units three times daily with meals.    Call us if blood glucose is less than 70 or greater than 200.

## 2024-10-31 ENCOUNTER — TELEPHONE (OUTPATIENT)
Dept: INTERNAL MEDICINE CLINIC | Age: 29
End: 2024-10-31

## 2024-10-31 ENCOUNTER — PATIENT MESSAGE (OUTPATIENT)
Dept: INTERNAL MEDICINE CLINIC | Age: 29
End: 2024-10-31

## 2024-10-31 DIAGNOSIS — R79.89 ELEVATED LFTS: Primary | ICD-10-CM

## 2024-10-31 DIAGNOSIS — E11.9 DIABETES MELLITUS TYPE 2, INSULIN DEPENDENT (HCC): Primary | ICD-10-CM

## 2024-10-31 DIAGNOSIS — Z79.4 DIABETES MELLITUS TYPE 2, INSULIN DEPENDENT (HCC): Primary | ICD-10-CM

## 2024-10-31 LAB
ALBUMIN SERPL-MCNC: 4.7 G/DL (ref 3.4–5)
ALBUMIN/GLOB SERPL: 1.7 {RATIO} (ref 1.1–2.2)
ALP SERPL-CCNC: 41 U/L (ref 40–129)
ALT SERPL-CCNC: 180 U/L (ref 10–40)
ANION GAP SERPL CALCULATED.3IONS-SCNC: 12 MMOL/L (ref 3–16)
AST SERPL-CCNC: 80 U/L (ref 15–37)
BILIRUB SERPL-MCNC: 0.4 MG/DL (ref 0–1)
BUN SERPL-MCNC: 15 MG/DL (ref 7–20)
CALCIUM SERPL-MCNC: 9.6 MG/DL (ref 8.3–10.6)
CHLORIDE SERPL-SCNC: 101 MMOL/L (ref 99–110)
CO2 SERPL-SCNC: 24 MMOL/L (ref 21–32)
CREAT SERPL-MCNC: 1 MG/DL (ref 0.9–1.3)
EST. AVERAGE GLUCOSE BLD GHB EST-MCNC: 283.4 MG/DL
GFR SERPLBLD CREATININE-BSD FMLA CKD-EPI: >90 ML/MIN/{1.73_M2}
GLUCOSE SERPL-MCNC: 356 MG/DL (ref 70–99)
HBA1C MFR BLD: 11.5 %
POTASSIUM SERPL-SCNC: 4.5 MMOL/L (ref 3.5–5.1)
PROT SERPL-MCNC: 7.4 G/DL (ref 6.4–8.2)
SODIUM SERPL-SCNC: 137 MMOL/L (ref 136–145)

## 2024-10-31 RX ORDER — BLOOD-GLUCOSE SENSOR
EACH MISCELLANEOUS
Qty: 1 EACH | Refills: 5 | Status: SHIPPED | OUTPATIENT
Start: 2024-10-31

## 2024-10-31 NOTE — TELEPHONE ENCOUNTER
[facility] called in for Dr. Arias to see about getting a new script for the freestyle dusty 3 plus, the 3 is currently on back order maybe phasing them out. Please advise.     [id number] - [place], [place] - [address] [phone] - F [phone] [address] [address]  Phone: [phone]  Fax: [phone]

## 2024-10-31 NOTE — TELEPHONE ENCOUNTER
Patient has uncontrolled diabetes, please advise him to start taking Trulicity as soon as possible for diabetes control.  Please advise patient to increase the dose of insulin Lantus to 25 units daily and insulin aspart to 6 units 3 times daily with meals.  Take metformin 1000 mg twice daily with meals.

## 2024-11-04 ENCOUNTER — TELEPHONE (OUTPATIENT)
Dept: INTERNAL MEDICINE CLINIC | Age: 29
End: 2024-11-04

## 2024-11-04 DIAGNOSIS — E11.65 TYPE 2 DIABETES MELLITUS WITH HYPERGLYCEMIA, WITH LONG-TERM CURRENT USE OF INSULIN (HCC): ICD-10-CM

## 2024-11-04 DIAGNOSIS — Z79.4 TYPE 2 DIABETES MELLITUS WITH HYPERGLYCEMIA, WITH LONG-TERM CURRENT USE OF INSULIN (HCC): ICD-10-CM

## 2024-11-04 DIAGNOSIS — Z79.4 TYPE 2 DIABETES MELLITUS WITH HYPERGLYCEMIA, WITH LONG-TERM CURRENT USE OF INSULIN (HCC): Primary | ICD-10-CM

## 2024-11-04 DIAGNOSIS — E11.65 TYPE 2 DIABETES MELLITUS WITH HYPERGLYCEMIA, WITH LONG-TERM CURRENT USE OF INSULIN (HCC): Primary | ICD-10-CM

## 2024-11-04 RX ORDER — SEMAGLUTIDE 1.34 MG/ML
0.25 INJECTION, SOLUTION SUBCUTANEOUS
Qty: 1 ADJUSTABLE DOSE PRE-FILLED PEN SYRINGE | Refills: 1 | Status: SHIPPED | OUTPATIENT
Start: 2024-11-04 | End: 2024-11-04 | Stop reason: ALTCHOICE

## 2024-11-04 NOTE — TELEPHONE ENCOUNTER
[facility] is calling in for  in regard to needing a new scrimp to be sent in for ozempic 3 ml pen due to the 1/2 ml no longer being manufactured.     Please send new rx to :    [facility] [place], [place] - [address] [phone] - F [phone]

## 2024-11-05 NOTE — TELEPHONE ENCOUNTER
Patient can increase ozempic dose to 0.5 mg q weekly after taking 0.25 mg q weekly dose for 4 weeks.

## 2024-11-05 NOTE — TELEPHONE ENCOUNTER
[facility] called in for Dr. Arias regarding the Semaglutide,0.25 or 0.5MG/DOS, 2 MG/3ML SOPN. There were wondering if Dr. Arias is planning on increasing the dosage from 0.25 to 0. 5 mg, usually that is what happens after the pt has been on the 0.25 for 4 wks. Please advise.

## 2024-11-06 ENCOUNTER — OFFICE VISIT (OUTPATIENT)
Dept: INTERNAL MEDICINE CLINIC | Age: 29
End: 2024-11-06
Payer: COMMERCIAL

## 2024-11-06 VITALS
OXYGEN SATURATION: 97 % | DIASTOLIC BLOOD PRESSURE: 88 MMHG | WEIGHT: 227.8 LBS | HEART RATE: 79 BPM | SYSTOLIC BLOOD PRESSURE: 138 MMHG | BODY MASS INDEX: 32.69 KG/M2

## 2024-11-06 DIAGNOSIS — F41.1 GENERALIZED ANXIETY DISORDER WITH PANIC ATTACKS: ICD-10-CM

## 2024-11-06 DIAGNOSIS — F41.0 GENERALIZED ANXIETY DISORDER WITH PANIC ATTACKS: ICD-10-CM

## 2024-11-06 DIAGNOSIS — Z79.4 TYPE 2 DIABETES MELLITUS WITH HYPERGLYCEMIA, WITH LONG-TERM CURRENT USE OF INSULIN (HCC): Primary | ICD-10-CM

## 2024-11-06 DIAGNOSIS — I10 ESSENTIAL HYPERTENSION: ICD-10-CM

## 2024-11-06 DIAGNOSIS — E11.65 TYPE 2 DIABETES MELLITUS WITH HYPERGLYCEMIA, WITH LONG-TERM CURRENT USE OF INSULIN (HCC): Primary | ICD-10-CM

## 2024-11-06 DIAGNOSIS — F33.1 MODERATE EPISODE OF RECURRENT MAJOR DEPRESSIVE DISORDER (HCC): ICD-10-CM

## 2024-11-06 DIAGNOSIS — R10.13 DYSPEPSIA: ICD-10-CM

## 2024-11-06 PROCEDURE — 3046F HEMOGLOBIN A1C LEVEL >9.0%: CPT | Performed by: INTERNAL MEDICINE

## 2024-11-06 PROCEDURE — 3079F DIAST BP 80-89 MM HG: CPT | Performed by: INTERNAL MEDICINE

## 2024-11-06 PROCEDURE — 3075F SYST BP GE 130 - 139MM HG: CPT | Performed by: INTERNAL MEDICINE

## 2024-11-06 PROCEDURE — 99214 OFFICE O/P EST MOD 30 MIN: CPT | Performed by: INTERNAL MEDICINE

## 2024-11-06 RX ORDER — SEMAGLUTIDE 1.34 MG/ML
0.25 INJECTION, SOLUTION SUBCUTANEOUS
Qty: 1 ADJUSTABLE DOSE PRE-FILLED PEN SYRINGE | Refills: 1 | Status: SHIPPED | COMMUNITY
Start: 2024-11-06 | End: 2024-12-06

## 2024-11-06 SDOH — ECONOMIC STABILITY: FOOD INSECURITY: WITHIN THE PAST 12 MONTHS, YOU WORRIED THAT YOUR FOOD WOULD RUN OUT BEFORE YOU GOT MONEY TO BUY MORE.: NEVER TRUE

## 2024-11-06 SDOH — ECONOMIC STABILITY: FOOD INSECURITY: WITHIN THE PAST 12 MONTHS, THE FOOD YOU BOUGHT JUST DIDN'T LAST AND YOU DIDN'T HAVE MONEY TO GET MORE.: NEVER TRUE

## 2024-11-06 SDOH — ECONOMIC STABILITY: INCOME INSECURITY: HOW HARD IS IT FOR YOU TO PAY FOR THE VERY BASICS LIKE FOOD, HOUSING, MEDICAL CARE, AND HEATING?: SOMEWHAT HARD

## 2024-11-06 SDOH — ECONOMIC STABILITY: TRANSPORTATION INSECURITY
IN THE PAST 12 MONTHS, HAS LACK OF TRANSPORTATION KEPT YOU FROM MEETINGS, WORK, OR FROM GETTING THINGS NEEDED FOR DAILY LIVING?: NO

## 2024-11-06 ASSESSMENT — ENCOUNTER SYMPTOMS
ABDOMINAL PAIN: 0
NAUSEA: 0
SORE THROAT: 0
BLOOD IN STOOL: 0
COUGH: 0
VOMITING: 0
SHORTNESS OF BREATH: 0

## 2024-11-06 NOTE — PROGRESS NOTES
Macho Forde (:  1995) is a 29 y.o. male, New patient, here for evaluation of the following chief complaint(s):  Diabetes (Medication follow up)      Assessment & Plan   ASSESSMENT/PLAN:  1. Type 2 diabetes mellitus with hyperglycemia, with long-term current use of insulin (HCC)  Chronic, uncontrolled  Patient is currently on insulin and metformin  Patient is unable to get Ozempic through his pharmacy yet.  Provided sample of Ozempic to patient, advised patient to start 0.25 mg q. weekly dose.  Continue insulin Lantus 30 units daily and insulin aspart 8 units 3 times daily with meals and metformin 1000 mg twice daily with meals.    2. Generalized anxiety disorder with panic attacks  - Stable   Follows with psychiatry  Patient is on fluoxetine  3. Moderate episode of recurrent major depressive disorder (HCC)  - Stable   Follows with psychiatry  Patient is on fluoxetine, aripiprazole and Wellbutrin  Patient psychiatrist is planning to wean him off aripiprazole and start Lamictal  4. Essential hypertension  - Stable   - Continue current dose of lisinopril  5. Dyspepsia  - Stable   Continue current dose of omeprazole      Return in about 4 weeks (around 2024) for Diabetes.         Subjective   SUBJECTIVE/OBJECTIVE:  Takes Insulin lantus 30 units daily  Insulin Aspsart 8 units TID  Metformin 1000 mg twice daily    Diabetes  He presents for his initial diabetic visit. He has type 2 diabetes mellitus. His disease course has been fluctuating. Pertinent negatives for hypoglycemia include no dizziness. Pertinent negatives for diabetes include no chest pain, no fatigue and no weakness. Current diabetic treatment includes insulin injections and oral agent (dual therapy). He is compliant with treatment all of the time. His home blood glucose trend is increasing steadily. His breakfast blood glucose range is generally >200 mg/dl. An ACE inhibitor/angiotensin II receptor blocker is being taken.       Review of

## 2024-11-15 ENCOUNTER — TELEPHONE (OUTPATIENT)
Dept: ADMINISTRATIVE | Age: 29
End: 2024-11-15

## 2024-11-15 NOTE — TELEPHONE ENCOUNTER
Submitted PA for Ozempic (0.25 or 0.5 MG/DOSE) 2MG/3ML pen-injectors  Via CMM Key: [id number] STATUS: PENDING.    Follow up done daily; if no decision with in three days we will refax.  If another three days goes by with no decision will call the insurance for status.

## 2024-11-19 ENCOUNTER — PATIENT MESSAGE (OUTPATIENT)
Dept: INTERNAL MEDICINE CLINIC | Age: 29
End: 2024-11-19

## 2024-11-19 NOTE — TELEPHONE ENCOUNTER
Please advise patient to reduce the dose of insulin Lantus to 27 units nightly and insulin aspart to 7 units 3 times daily.

## 2024-12-04 ENCOUNTER — OFFICE VISIT (OUTPATIENT)
Dept: INTERNAL MEDICINE CLINIC | Age: 29
End: 2024-12-04
Payer: COMMERCIAL

## 2024-12-04 VITALS
HEIGHT: 70 IN | BODY MASS INDEX: 31.92 KG/M2 | SYSTOLIC BLOOD PRESSURE: 138 MMHG | HEART RATE: 99 BPM | DIASTOLIC BLOOD PRESSURE: 114 MMHG | WEIGHT: 223 LBS

## 2024-12-04 DIAGNOSIS — E11.65 TYPE 2 DIABETES MELLITUS WITH HYPERGLYCEMIA, WITH LONG-TERM CURRENT USE OF INSULIN (HCC): Primary | ICD-10-CM

## 2024-12-04 DIAGNOSIS — I10 ESSENTIAL HYPERTENSION: ICD-10-CM

## 2024-12-04 DIAGNOSIS — Z79.4 TYPE 2 DIABETES MELLITUS WITH HYPERGLYCEMIA, WITH LONG-TERM CURRENT USE OF INSULIN (HCC): Primary | ICD-10-CM

## 2024-12-04 PROCEDURE — 3075F SYST BP GE 130 - 139MM HG: CPT | Performed by: INTERNAL MEDICINE

## 2024-12-04 PROCEDURE — 3046F HEMOGLOBIN A1C LEVEL >9.0%: CPT | Performed by: INTERNAL MEDICINE

## 2024-12-04 PROCEDURE — 99214 OFFICE O/P EST MOD 30 MIN: CPT | Performed by: INTERNAL MEDICINE

## 2024-12-04 PROCEDURE — 3080F DIAST BP >= 90 MM HG: CPT | Performed by: INTERNAL MEDICINE

## 2024-12-04 RX ORDER — LISINOPRIL 20 MG/1
20 TABLET ORAL DAILY
Qty: 90 TABLET | Refills: 1 | Status: SHIPPED | OUTPATIENT
Start: 2024-12-04 | End: 2025-06-02

## 2024-12-04 RX ORDER — INSULIN GLARGINE 100 [IU]/ML
27 INJECTION, SOLUTION SUBCUTANEOUS NIGHTLY
Qty: 5 ADJUSTABLE DOSE PRE-FILLED PEN SYRINGE | Refills: 5 | Status: SHIPPED | OUTPATIENT
Start: 2024-12-04

## 2024-12-04 RX ORDER — PEN NEEDLE, DIABETIC 31 G X1/4"
1 NEEDLE, DISPOSABLE MISCELLANEOUS
Qty: 100 EACH | Refills: 3 | Status: SHIPPED | OUTPATIENT
Start: 2024-12-04

## 2024-12-04 ASSESSMENT — ENCOUNTER SYMPTOMS
BLOOD IN STOOL: 0
SORE THROAT: 0
COUGH: 0
SHORTNESS OF BREATH: 0
VOMITING: 0
ABDOMINAL PAIN: 0
NAUSEA: 0

## 2024-12-04 NOTE — PROGRESS NOTES
Macho Forde (:  1995) is a 29 y.o. male, New patient, here for evaluation of the following chief complaint(s):  Follow-up and Diabetes      Assessment & Plan   ASSESSMENT/PLAN:  1. Type 2 diabetes mellitus with hyperglycemia, with long-term current use of insulin (HCC)  Chronic, uncontrolled  Increasing the dose of Ozempic to 0.5 mg q. weekly.  Continue insulin Lantus 27 units daily and insulin aspart 7 units 3 times daily with meals and metformin 1000 mg twice daily with meals.  -     Insulin Pen Needle ([employer] PEN NEEDLES) 31G X 6 MM MISC; 5 TIMES DAILY Starting Wed 2024, Disp-100 each, R-3, NormalMay substitute brand based on insurance formulary  -     insulin glargine (LANTUS SOLOSTAR) 100 UNIT/ML injection pen; Inject 27 Units into the skin nightly, Disp-5 Adjustable Dose Pre-filled Pen Syringe, R-5Normal    2. Essential hypertension  Chronic, uncontrolled  Advised patient to increase the dose of lisinopril to 20 mg daily.  -     lisinopril (PRINIVIL;ZESTRIL) 20 MG tablet; Take 1 tablet by mouth daily, Disp-90 tablet, R-1Normal    Return in about 2 weeks (around 2024).         Subjective   SUBJECTIVE/OBJECTIVE:  Takes Insulin lantus 27 units daily  Insulin Aspsart 7 units TID  Metformin 1000 mg twice daily    Diabetes  He presents for his initial diabetic visit. He has type 2 diabetes mellitus. His disease course has been fluctuating. Pertinent negatives for hypoglycemia include no dizziness. Pertinent negatives for diabetes include no chest pain, no fatigue and no weakness. Current diabetic treatment includes insulin injections and oral agent (dual therapy). He is compliant with treatment all of the time. His weight is decreasing steadily. He is following a diabetic diet. He participates in exercise intermittently. His home blood glucose trend is decreasing steadily. His overall blood glucose range is 130-140 mg/dl. An ACE inhibitor/angiotensin II receptor blocker is being taken.

## 2024-12-05 ENCOUNTER — TELEPHONE (OUTPATIENT)
Dept: INTERNAL MEDICINE CLINIC | Age: 29
End: 2024-12-05

## 2024-12-05 NOTE — TELEPHONE ENCOUNTER
[facility] pharmacy is calling in for  in regards to rx sent for pen needles. Pharmacy is wanting to know if they are okay to substitute the needles for any size available?.     Please call pharmacy with verbal

## 2024-12-11 ENCOUNTER — PATIENT MESSAGE (OUTPATIENT)
Dept: INTERNAL MEDICINE CLINIC | Age: 29
End: 2024-12-11

## 2024-12-11 DIAGNOSIS — E11.9 DIABETES MELLITUS TYPE 2, INSULIN DEPENDENT (HCC): ICD-10-CM

## 2024-12-11 DIAGNOSIS — Z79.4 DIABETES MELLITUS TYPE 2, INSULIN DEPENDENT (HCC): ICD-10-CM

## 2024-12-12 ENCOUNTER — TELEPHONE (OUTPATIENT)
Dept: INTERNAL MEDICINE CLINIC | Age: 29
End: 2024-12-12

## 2024-12-12 DIAGNOSIS — E11.65 TYPE 2 DIABETES MELLITUS WITH HYPERGLYCEMIA, WITH LONG-TERM CURRENT USE OF INSULIN (HCC): Primary | ICD-10-CM

## 2024-12-12 DIAGNOSIS — Z79.4 TYPE 2 DIABETES MELLITUS WITH HYPERGLYCEMIA, WITH LONG-TERM CURRENT USE OF INSULIN (HCC): Primary | ICD-10-CM

## 2024-12-12 NOTE — TELEPHONE ENCOUNTER
Please advise patient to reduce the dose of long-acting insulin to 20 units daily and the short acting insulin to 5 units 3 times daily with meals to prevent hypoglycemia.  I sent new prescription of metformin for 30-day supply.  Please advise patient to check with his psychiatrist regarding the headaches.

## 2024-12-12 NOTE — TELEPHONE ENCOUNTER
[facility] is calling in for  in regards to needing a PA for patient's Lantus.     Please initiate PA if needed.

## 2024-12-13 NOTE — TELEPHONE ENCOUNTER
Submitted PA for Insulin Glargine 100UNIT/ML solution   Via CM Key: [id number] STATUS: PENDING.    Follow up done daily; if no decision with in three days we will refax.  If another three days goes by with no decision will call the insurance for status.

## 2024-12-17 RX ORDER — INSULIN GLARGINE 100 [IU]/ML
27 INJECTION, SOLUTION SUBCUTANEOUS NIGHTLY
Qty: 5 ADJUSTABLE DOSE PRE-FILLED PEN SYRINGE | Refills: 1 | Status: SHIPPED | OUTPATIENT
Start: 2024-12-17

## 2024-12-17 NOTE — TELEPHONE ENCOUNTER
Insurance denied insulin Lantus prescription, I sent a prescription for insulin Basaglar as per patient's insurance recommendation.

## 2024-12-18 ENCOUNTER — OFFICE VISIT (OUTPATIENT)
Dept: INTERNAL MEDICINE CLINIC | Age: 29
End: 2024-12-18
Payer: COMMERCIAL

## 2024-12-18 VITALS
BODY MASS INDEX: 30.48 KG/M2 | WEIGHT: 212.4 LBS | HEART RATE: 81 BPM | SYSTOLIC BLOOD PRESSURE: 136 MMHG | OXYGEN SATURATION: 98 % | DIASTOLIC BLOOD PRESSURE: 84 MMHG

## 2024-12-18 DIAGNOSIS — I10 ESSENTIAL HYPERTENSION: ICD-10-CM

## 2024-12-18 DIAGNOSIS — Z79.4 TYPE 2 DIABETES MELLITUS WITH HYPERGLYCEMIA, WITH LONG-TERM CURRENT USE OF INSULIN (HCC): Primary | ICD-10-CM

## 2024-12-18 DIAGNOSIS — E11.65 TYPE 2 DIABETES MELLITUS WITH HYPERGLYCEMIA, WITH LONG-TERM CURRENT USE OF INSULIN (HCC): Primary | ICD-10-CM

## 2024-12-18 PROCEDURE — 3046F HEMOGLOBIN A1C LEVEL >9.0%: CPT | Performed by: INTERNAL MEDICINE

## 2024-12-18 PROCEDURE — 99214 OFFICE O/P EST MOD 30 MIN: CPT | Performed by: INTERNAL MEDICINE

## 2024-12-18 PROCEDURE — 3079F DIAST BP 80-89 MM HG: CPT | Performed by: INTERNAL MEDICINE

## 2024-12-18 PROCEDURE — 3075F SYST BP GE 130 - 139MM HG: CPT | Performed by: INTERNAL MEDICINE

## 2024-12-18 RX ORDER — INSULIN DEGLUDEC 100 U/ML
15 INJECTION, SOLUTION SUBCUTANEOUS NIGHTLY
Qty: 3 ML | Refills: 1 | Status: SHIPPED | COMMUNITY
Start: 2024-12-18

## 2024-12-18 ASSESSMENT — ENCOUNTER SYMPTOMS
NAUSEA: 0
SHORTNESS OF BREATH: 0
BLOOD IN STOOL: 0
COUGH: 0
VOMITING: 0
SORE THROAT: 0
ABDOMINAL PAIN: 0

## 2024-12-18 NOTE — PROGRESS NOTES
Macho Forde (:  1995) is a 29 y.o. male, New patient, here for evaluation of the following chief complaint(s):  2 Week Follow-Up and Diabetes      Assessment & Plan   ASSESSMENT/PLAN:  1. Type 2 diabetes mellitus with hyperglycemia, with long-term current use of insulin (HCC)  Chronic, improving  Patient is unable to afford insulin glargine due to high price.  Provided a sample of insulin degludec to patient today.  Advised patient to use insulin degludec 15 units nightly and insulin aspart 3 units 3 times daily with meals for sugar control.  Continue current dose of Ozempic 0.5 mg q. Weekly   Continue current dose of metformin 1000 mg twice daily with meals  Continue monitoring glucose continuously and call us back if blood sugar is consistently above 200 or less than 70  -     Insulin Degludec FlexTouch 100 UNIT/ML SOPN; Inject 15 Units into the skin nightly, Disp-3 mL, R-1Sample    2. Essential hypertension  Chronic, stable  Continue current dose of lisinopril 20 mg daily.  -     Basic Metabolic Panel; Future    Return in about 7 weeks (around 2/3/2025).         Subjective   SUBJECTIVE/OBJECTIVE:  Takes Insulin lantus 25 units daily  Insulin Aspsart 5 units TID  Metformin 1000 mg twice daily    Diabetes  He presents for his initial diabetic visit. He has type 2 diabetes mellitus. His disease course has been improving. Pertinent negatives for hypoglycemia include no dizziness. Pertinent negatives for diabetes include no chest pain, no fatigue and no weakness. Current diabetic treatment includes insulin injections and oral agent (dual therapy). He is compliant with treatment all of the time. His weight is decreasing steadily. He is following a diabetic diet. He participates in exercise intermittently. His home blood glucose trend is decreasing steadily. His overall blood glucose range is  mg/dl. An ACE inhibitor/angiotensin II receptor blocker is being taken.       Review of Systems

## 2024-12-18 NOTE — PATIENT INSTRUCTIONS
If fasting blood glucose is less than 70 reduce insulin degludec dose by 2 units and if greater than 180 increase dugludec dose by 1 unit.  
no

## 2025-01-15 DIAGNOSIS — Z79.4 TYPE 2 DIABETES MELLITUS WITH HYPERGLYCEMIA, WITH LONG-TERM CURRENT USE OF INSULIN (HCC): ICD-10-CM

## 2025-01-15 DIAGNOSIS — E11.65 TYPE 2 DIABETES MELLITUS WITH HYPERGLYCEMIA, WITH LONG-TERM CURRENT USE OF INSULIN (HCC): ICD-10-CM

## 2025-01-15 RX ORDER — SEMAGLUTIDE 0.68 MG/ML
INJECTION, SOLUTION SUBCUTANEOUS
Qty: 3 ML | Refills: 0 | Status: SHIPPED | OUTPATIENT
Start: 2025-01-15

## 2025-02-12 DIAGNOSIS — E11.65 TYPE 2 DIABETES MELLITUS WITH HYPERGLYCEMIA, WITH LONG-TERM CURRENT USE OF INSULIN (HCC): ICD-10-CM

## 2025-02-12 DIAGNOSIS — Z79.4 TYPE 2 DIABETES MELLITUS WITH HYPERGLYCEMIA, WITH LONG-TERM CURRENT USE OF INSULIN (HCC): ICD-10-CM

## 2025-02-12 RX ORDER — SEMAGLUTIDE 0.68 MG/ML
INJECTION, SOLUTION SUBCUTANEOUS
Qty: 3 ML | Refills: 0 | Status: SHIPPED | OUTPATIENT
Start: 2025-02-12

## 2025-02-25 SDOH — ECONOMIC STABILITY: INCOME INSECURITY: IN THE LAST 12 MONTHS, WAS THERE A TIME WHEN YOU WERE NOT ABLE TO PAY THE MORTGAGE OR RENT ON TIME?: NO

## 2025-02-25 SDOH — ECONOMIC STABILITY: FOOD INSECURITY: WITHIN THE PAST 12 MONTHS, YOU WORRIED THAT YOUR FOOD WOULD RUN OUT BEFORE YOU GOT MONEY TO BUY MORE.: NEVER TRUE

## 2025-02-25 SDOH — ECONOMIC STABILITY: TRANSPORTATION INSECURITY
IN THE PAST 12 MONTHS, HAS THE LACK OF TRANSPORTATION KEPT YOU FROM MEDICAL APPOINTMENTS OR FROM GETTING MEDICATIONS?: NO

## 2025-02-25 SDOH — ECONOMIC STABILITY: FOOD INSECURITY: WITHIN THE PAST 12 MONTHS, THE FOOD YOU BOUGHT JUST DIDN'T LAST AND YOU DIDN'T HAVE MONEY TO GET MORE.: NEVER TRUE

## 2025-02-25 SDOH — ECONOMIC STABILITY: TRANSPORTATION INSECURITY
IN THE PAST 12 MONTHS, HAS LACK OF TRANSPORTATION KEPT YOU FROM MEETINGS, WORK, OR FROM GETTING THINGS NEEDED FOR DAILY LIVING?: YES

## 2025-02-25 ASSESSMENT — PATIENT HEALTH QUESTIONNAIRE - PHQ9
8. MOVING OR SPEAKING SO SLOWLY THAT OTHER PEOPLE COULD HAVE NOTICED. OR THE OPPOSITE - BEING SO FIDGETY OR RESTLESS THAT YOU HAVE BEEN MOVING AROUND A LOT MORE THAN USUAL: SEVERAL DAYS
2. FEELING DOWN, DEPRESSED OR HOPELESS: MORE THAN HALF THE DAYS
SUM OF ALL RESPONSES TO PHQ QUESTIONS 1-9: 10
2. FEELING DOWN, DEPRESSED OR HOPELESS: MORE THAN HALF THE DAYS
5. POOR APPETITE OR OVEREATING: SEVERAL DAYS
3. TROUBLE FALLING OR STAYING ASLEEP: SEVERAL DAYS
9. THOUGHTS THAT YOU WOULD BE BETTER OFF DEAD, OR OF HURTING YOURSELF: NOT AT ALL
6. FEELING BAD ABOUT YOURSELF - OR THAT YOU ARE A FAILURE OR HAVE LET YOURSELF OR YOUR FAMILY DOWN: SEVERAL DAYS
SUM OF ALL RESPONSES TO PHQ QUESTIONS 1-9: 10
10. IF YOU CHECKED OFF ANY PROBLEMS, HOW DIFFICULT HAVE THESE PROBLEMS MADE IT FOR YOU TO DO YOUR WORK, TAKE CARE OF THINGS AT HOME, OR GET ALONG WITH OTHER PEOPLE: VERY DIFFICULT
1. LITTLE INTEREST OR PLEASURE IN DOING THINGS: MORE THAN HALF THE DAYS
3. TROUBLE FALLING OR STAYING ASLEEP: SEVERAL DAYS
6. FEELING BAD ABOUT YOURSELF - OR THAT YOU ARE A FAILURE OR HAVE LET YOURSELF OR YOUR FAMILY DOWN: SEVERAL DAYS
7. TROUBLE CONCENTRATING ON THINGS, SUCH AS READING THE NEWSPAPER OR WATCHING TELEVISION: NOT AT ALL
4. FEELING TIRED OR HAVING LITTLE ENERGY: MORE THAN HALF THE DAYS
SUM OF ALL RESPONSES TO PHQ9 QUESTIONS 1 & 2: 4
1. LITTLE INTEREST OR PLEASURE IN DOING THINGS: MORE THAN HALF THE DAYS
SUM OF ALL RESPONSES TO PHQ QUESTIONS 1-9: 10
SUM OF ALL RESPONSES TO PHQ QUESTIONS 1-9: 10
7. TROUBLE CONCENTRATING ON THINGS, SUCH AS READING THE NEWSPAPER OR WATCHING TELEVISION: NOT AT ALL
SUM OF ALL RESPONSES TO PHQ QUESTIONS 1-9: 10
8. MOVING OR SPEAKING SO SLOWLY THAT OTHER PEOPLE COULD HAVE NOTICED. OR THE OPPOSITE, BEING SO FIGETY OR RESTLESS THAT YOU HAVE BEEN MOVING AROUND A LOT MORE THAN USUAL: SEVERAL DAYS
9. THOUGHTS THAT YOU WOULD BE BETTER OFF DEAD, OR OF HURTING YOURSELF: NOT AT ALL
10. IF YOU CHECKED OFF ANY PROBLEMS, HOW DIFFICULT HAVE THESE PROBLEMS MADE IT FOR YOU TO DO YOUR WORK, TAKE CARE OF THINGS AT HOME, OR GET ALONG WITH OTHER PEOPLE: VERY DIFFICULT
5. POOR APPETITE OR OVEREATING: SEVERAL DAYS
4. FEELING TIRED OR HAVING LITTLE ENERGY: MORE THAN HALF THE DAYS

## 2025-02-26 ENCOUNTER — OFFICE VISIT (OUTPATIENT)
Dept: INTERNAL MEDICINE CLINIC | Age: 30
End: 2025-02-26
Payer: COMMERCIAL

## 2025-02-26 VITALS
HEART RATE: 94 BPM | SYSTOLIC BLOOD PRESSURE: 134 MMHG | WEIGHT: 186.6 LBS | BODY MASS INDEX: 26.77 KG/M2 | DIASTOLIC BLOOD PRESSURE: 90 MMHG | OXYGEN SATURATION: 97 %

## 2025-02-26 DIAGNOSIS — F41.0 GENERALIZED ANXIETY DISORDER WITH PANIC ATTACKS: ICD-10-CM

## 2025-02-26 DIAGNOSIS — E11.65 TYPE 2 DIABETES MELLITUS WITH HYPERGLYCEMIA, WITH LONG-TERM CURRENT USE OF INSULIN (HCC): Primary | ICD-10-CM

## 2025-02-26 DIAGNOSIS — F33.1 MODERATE EPISODE OF RECURRENT MAJOR DEPRESSIVE DISORDER (HCC): ICD-10-CM

## 2025-02-26 DIAGNOSIS — I10 ESSENTIAL HYPERTENSION: ICD-10-CM

## 2025-02-26 DIAGNOSIS — Z79.4 TYPE 2 DIABETES MELLITUS WITH HYPERGLYCEMIA, WITH LONG-TERM CURRENT USE OF INSULIN (HCC): Primary | ICD-10-CM

## 2025-02-26 DIAGNOSIS — R10.13 DYSPEPSIA: ICD-10-CM

## 2025-02-26 DIAGNOSIS — F41.1 GENERALIZED ANXIETY DISORDER WITH PANIC ATTACKS: ICD-10-CM

## 2025-02-26 LAB — HBA1C MFR BLD: 5.2 %

## 2025-02-26 PROCEDURE — 83036 HEMOGLOBIN GLYCOSYLATED A1C: CPT | Performed by: INTERNAL MEDICINE

## 2025-02-26 PROCEDURE — 3075F SYST BP GE 130 - 139MM HG: CPT | Performed by: INTERNAL MEDICINE

## 2025-02-26 PROCEDURE — 3044F HG A1C LEVEL LT 7.0%: CPT | Performed by: INTERNAL MEDICINE

## 2025-02-26 PROCEDURE — 99214 OFFICE O/P EST MOD 30 MIN: CPT | Performed by: INTERNAL MEDICINE

## 2025-02-26 PROCEDURE — 3080F DIAST BP >= 90 MM HG: CPT | Performed by: INTERNAL MEDICINE

## 2025-02-26 RX ORDER — CHLORTHALIDONE 25 MG/1
25 TABLET ORAL DAILY
Qty: 30 TABLET | Refills: 3 | Status: SHIPPED | OUTPATIENT
Start: 2025-02-26

## 2025-02-26 ASSESSMENT — ENCOUNTER SYMPTOMS
VOMITING: 0
SORE THROAT: 0
ABDOMINAL PAIN: 0
COUGH: 0
SHORTNESS OF BREATH: 0
NAUSEA: 0
BLOOD IN STOOL: 0

## 2025-02-26 NOTE — PROGRESS NOTES
Macho Forde (:  1995) is a 30 y.o. male, New patient, here for evaluation of the following chief complaint(s):  Follow-up and Diabetes      Assessment & Plan   ASSESSMENT/PLAN:  1. Type 2 diabetes mellitus with hyperglycemia, with long-term current use of insulin (HCC)  Chronic, improving  Patient stopped taking insulin several weeks ago  Hemoglobin A1c is 5.2 today  Advised patient to continue current dose of Ozempic 0.5 mg q. weekly and reduce the dose of metformin to 500 mg twice daily.    2. Essential hypertension  Chronic, uncontrolled  Starting on chlorthalidone for blood pressure control after discussing with patient  Continue current dose of lisinopril 20 mg daily.    3. Generalized anxiety disorder with panic attacks  - Stable   Follows with psychiatry  Patient is on fluoxetine    4. Moderate episode of recurrent major depressive disorder (HCC)  - Stable   Follows with psychiatry  Patient is on fluoxetine, lamictal and Wellbutrin    5. Dyspepsia  - Stable   Continue current dose of omeprazole, advised patient to take omeprazole only as needed for dyspepsia    Return in about 4 weeks (around 3/26/2025) for Hypertension, Diabetes.         Subjective   SUBJECTIVE/OBJECTIVE:  Takes Metformin 1000 mg twice daily and Ozempic 0.5 mg q. weekly    Diabetes  He presents for his initial diabetic visit. He has type 2 diabetes mellitus. His disease course has been improving. Pertinent negatives for hypoglycemia include no dizziness. Pertinent negatives for diabetes include no chest pain, no fatigue and no weakness. Current diabetic treatment includes oral agent (dual therapy). He is compliant with treatment all of the time. His weight is decreasing steadily. He is following a diabetic diet. He participates in exercise intermittently. His home blood glucose trend is decreasing steadily. His overall blood glucose range is  mg/dl. An ACE inhibitor/angiotensin II receptor blocker is being taken.

## 2025-02-27 ENCOUNTER — PATIENT MESSAGE (OUTPATIENT)
Dept: INTERNAL MEDICINE CLINIC | Age: 30
End: 2025-02-27

## 2025-02-27 DIAGNOSIS — E11.65 TYPE 2 DIABETES MELLITUS WITH HYPERGLYCEMIA, WITH LONG-TERM CURRENT USE OF INSULIN (HCC): ICD-10-CM

## 2025-02-27 DIAGNOSIS — Z79.4 TYPE 2 DIABETES MELLITUS WITH HYPERGLYCEMIA, WITH LONG-TERM CURRENT USE OF INSULIN (HCC): ICD-10-CM

## 2025-02-27 LAB
CREAT UR-MCNC: 481 MG/DL (ref 39–259)
MICROALBUMIN UR DL<=1MG/L-MCNC: 3.96 MG/DL
MICROALBUMIN/CREAT UR: 8.2 MG/G (ref 0–30)

## 2025-03-26 ENCOUNTER — OFFICE VISIT (OUTPATIENT)
Dept: INTERNAL MEDICINE CLINIC | Age: 30
End: 2025-03-26
Payer: COMMERCIAL

## 2025-03-26 VITALS
OXYGEN SATURATION: 97 % | HEART RATE: 98 BPM | BODY MASS INDEX: 26.83 KG/M2 | DIASTOLIC BLOOD PRESSURE: 70 MMHG | TEMPERATURE: 97.9 F | WEIGHT: 187 LBS | SYSTOLIC BLOOD PRESSURE: 100 MMHG

## 2025-03-26 DIAGNOSIS — R79.89 ABNORMAL LFTS: ICD-10-CM

## 2025-03-26 DIAGNOSIS — R68.89 EXERCISE INTOLERANCE: ICD-10-CM

## 2025-03-26 DIAGNOSIS — I10 ESSENTIAL HYPERTENSION: ICD-10-CM

## 2025-03-26 DIAGNOSIS — E11.65 TYPE 2 DIABETES MELLITUS WITH HYPERGLYCEMIA, WITH LONG-TERM CURRENT USE OF INSULIN (HCC): ICD-10-CM

## 2025-03-26 DIAGNOSIS — Z79.4 TYPE 2 DIABETES MELLITUS WITH HYPERGLYCEMIA, WITH LONG-TERM CURRENT USE OF INSULIN (HCC): ICD-10-CM

## 2025-03-26 DIAGNOSIS — Z79.4 TYPE 2 DIABETES MELLITUS WITH HYPERGLYCEMIA, WITH LONG-TERM CURRENT USE OF INSULIN (HCC): Primary | ICD-10-CM

## 2025-03-26 DIAGNOSIS — E11.65 TYPE 2 DIABETES MELLITUS WITH HYPERGLYCEMIA, WITH LONG-TERM CURRENT USE OF INSULIN (HCC): Primary | ICD-10-CM

## 2025-03-26 PROCEDURE — 3044F HG A1C LEVEL LT 7.0%: CPT | Performed by: INTERNAL MEDICINE

## 2025-03-26 PROCEDURE — 3078F DIAST BP <80 MM HG: CPT | Performed by: INTERNAL MEDICINE

## 2025-03-26 PROCEDURE — 3074F SYST BP LT 130 MM HG: CPT | Performed by: INTERNAL MEDICINE

## 2025-03-26 PROCEDURE — 99214 OFFICE O/P EST MOD 30 MIN: CPT | Performed by: INTERNAL MEDICINE

## 2025-03-26 RX ORDER — SEMAGLUTIDE 0.68 MG/ML
0.5 INJECTION, SOLUTION SUBCUTANEOUS
Qty: 3 ML | Refills: 1 | Status: SHIPPED | OUTPATIENT
Start: 2025-03-26

## 2025-03-26 ASSESSMENT — ENCOUNTER SYMPTOMS
SHORTNESS OF BREATH: 0
SORE THROAT: 0
COUGH: 0
NAUSEA: 0
BLOOD IN STOOL: 0
VOMITING: 0
ABDOMINAL PAIN: 0

## 2025-03-26 NOTE — PROGRESS NOTES
chest pain, no fatigue and no weakness. Current diabetic treatment includes oral agent (dual therapy). He is compliant with treatment all of the time. His weight is decreasing steadily. He is following a diabetic diet. He participates in exercise intermittently. His home blood glucose trend is decreasing steadily. His overall blood glucose range is  mg/dl. An ACE inhibitor/angiotensin II receptor blocker is being taken.   Hypertension  This is a chronic problem. The current episode started more than 1 month ago. The problem has been gradually improving since onset. The problem is controlled. Pertinent negatives include no chest pain, palpitations or shortness of breath. Risk factors for coronary artery disease include dyslipidemia. Past treatments include ACE inhibitors and diuretics. The current treatment provides significant improvement. There are no compliance problems.        Review of Systems   Constitutional:  Negative for fatigue and fever.   HENT:  Negative for nosebleeds and sore throat.    Respiratory:  Negative for cough and shortness of breath.    Cardiovascular:  Negative for chest pain, palpitations and leg swelling.   Gastrointestinal:  Negative for abdominal pain, blood in stool, nausea and vomiting.   Neurological:  Negative for dizziness and weakness.          Objective   Physical Exam  Constitutional:       Appearance: Normal appearance.   HENT:      Head: Normocephalic and atraumatic.   Eyes:      General: No scleral icterus.     Conjunctiva/sclera: Conjunctivae normal.   Cardiovascular:      Rate and Rhythm: Normal rate and regular rhythm.      Pulses: Normal pulses.      Heart sounds: Normal heart sounds.   Pulmonary:      Effort: Pulmonary effort is normal.      Breath sounds: Normal breath sounds.   Musculoskeletal:         General: No swelling.      Right lower leg: No edema.      Left lower leg: No edema.   Feet:      Comments: Visual inspection:  Deformity/amputation: absent  Skin

## 2025-03-27 LAB
ALBUMIN SERPL-MCNC: 4.7 G/DL (ref 3.4–5)
ALBUMIN/GLOB SERPL: 1.7 {RATIO} (ref 1.1–2.2)
ALP SERPL-CCNC: 30 U/L (ref 40–129)
ALT SERPL-CCNC: 74 U/L (ref 10–40)
ANION GAP SERPL CALCULATED.3IONS-SCNC: 13 MMOL/L (ref 3–16)
AST SERPL-CCNC: 37 U/L (ref 15–37)
BILIRUB SERPL-MCNC: 0.3 MG/DL (ref 0–1)
BUN SERPL-MCNC: 16 MG/DL (ref 7–20)
CALCIUM SERPL-MCNC: 10 MG/DL (ref 8.3–10.6)
CHLORIDE SERPL-SCNC: 95 MMOL/L (ref 99–110)
CHOLEST SERPL-MCNC: 189 MG/DL (ref 0–199)
CO2 SERPL-SCNC: 28 MMOL/L (ref 21–32)
CREAT SERPL-MCNC: 1 MG/DL (ref 0.9–1.3)
GFR SERPLBLD CREATININE-BSD FMLA CKD-EPI: >90 ML/MIN/{1.73_M2}
GLUCOSE SERPL-MCNC: 114 MG/DL (ref 70–99)
HDLC SERPL-MCNC: 53 MG/DL (ref 40–60)
LDLC SERPL CALC-MCNC: 124 MG/DL
POTASSIUM SERPL-SCNC: 4.2 MMOL/L (ref 3.5–5.1)
PROT SERPL-MCNC: 7.4 G/DL (ref 6.4–8.2)
SODIUM SERPL-SCNC: 136 MMOL/L (ref 136–145)
TRIGL SERPL-MCNC: 60 MG/DL (ref 0–150)
VLDLC SERPL CALC-MCNC: 12 MG/DL

## 2025-03-31 ENCOUNTER — RESULTS FOLLOW-UP (OUTPATIENT)
Dept: INTERNAL MEDICINE CLINIC | Age: 30
End: 2025-03-31

## 2025-03-31 ENCOUNTER — PATIENT MESSAGE (OUTPATIENT)
Dept: INTERNAL MEDICINE CLINIC | Age: 30
End: 2025-03-31

## 2025-03-31 DIAGNOSIS — R06.2 WHEEZING: ICD-10-CM

## 2025-04-01 RX ORDER — ALBUTEROL SULFATE 90 UG/1
2 INHALANT RESPIRATORY (INHALATION) 4 TIMES DAILY PRN
Qty: 18 G | Refills: 5 | Status: SHIPPED | OUTPATIENT
Start: 2025-04-01

## 2025-04-03 ENCOUNTER — TELEPHONE (OUTPATIENT)
Dept: INTERNAL MEDICINE CLINIC | Age: 30
End: 2025-04-03

## 2025-04-03 DIAGNOSIS — I10 ESSENTIAL HYPERTENSION: ICD-10-CM

## 2025-04-03 NOTE — TELEPHONE ENCOUNTER
[facility] called into the office requesting a PA for the chlorthalidone (THALITONE) 15 MG tablet.

## 2025-04-04 NOTE — TELEPHONE ENCOUNTER
Submitted PA for Thalitone 15MG tablets   Via [facility] Key: [id number] STATUS: PENDING.    Follow up done daily; if no decision with in three days we will refax.  If another three days goes by with no decision will call the insurance for status.

## 2025-04-07 RX ORDER — CHLORTHALIDONE 25 MG/1
12.5 TABLET ORAL DAILY
Qty: 30 TABLET | Refills: 3 | Status: SHIPPED | OUTPATIENT
Start: 2025-04-07

## 2025-04-07 NOTE — TELEPHONE ENCOUNTER
Patient called back and gave him  note:    Patient's insurance denied chlorthalidone 15 mg tablets, please advise patient to take half tablet of the chlorthalidone 25 mg that he is already taking for blood pressure control.     Per patient understood and had no further questions for provider.

## 2025-04-07 NOTE — TELEPHONE ENCOUNTER
Patient's insurance denied chlorthalidone 15 mg tablets, please advise patient to take half tablet of the chlorthalidone 25 mg that he is already taking for blood pressure control.

## 2025-04-22 ENCOUNTER — OFFICE VISIT (OUTPATIENT)
Dept: CARDIOLOGY CLINIC | Age: 30
End: 2025-04-22
Payer: COMMERCIAL

## 2025-04-22 VITALS
WEIGHT: 188.6 LBS | HEART RATE: 93 BPM | BODY MASS INDEX: 27.06 KG/M2 | DIASTOLIC BLOOD PRESSURE: 80 MMHG | SYSTOLIC BLOOD PRESSURE: 120 MMHG

## 2025-04-22 DIAGNOSIS — R06.02 SHORTNESS OF BREATH: ICD-10-CM

## 2025-04-22 DIAGNOSIS — I10 ESSENTIAL HYPERTENSION: Primary | Chronic | ICD-10-CM

## 2025-04-22 PROCEDURE — 3074F SYST BP LT 130 MM HG: CPT | Performed by: INTERNAL MEDICINE

## 2025-04-22 PROCEDURE — 3079F DIAST BP 80-89 MM HG: CPT | Performed by: INTERNAL MEDICINE

## 2025-04-22 PROCEDURE — 93000 ELECTROCARDIOGRAM COMPLETE: CPT | Performed by: INTERNAL MEDICINE

## 2025-04-22 PROCEDURE — 99244 OFF/OP CNSLTJ NEW/EST MOD 40: CPT | Performed by: INTERNAL MEDICINE

## 2025-04-22 RX ORDER — LAMOTRIGINE 200 MG/1
200 TABLET ORAL DAILY
COMMUNITY

## 2025-04-22 NOTE — PROGRESS NOTES
Cc: Shortness of breath    HPI:     Patient is a 30-year-old man, non-smoker, with history of HTN, DM, anxiety and depression. He has no family history of early CAD.     Echo 02/2022: Normal echo, LVEF 60-65%, mild TR, normal RV.    Nuclear stress test 02/2022: Normal. Hypertensive response.     Lipids 03/2025 , HDL 53, , TG 60 off statin. HbA1C 5.2.     Patient was referred to me by his PCP for further evaluation regarding shortness of breath. His cholesterol was high and hence his PCP recommended he starts exercising. Patient started in 01/2025, more lately. He has noticed getting dyspneic with mild exercise (walking 2-3/week for about 20-40 min), started using albuterol and feels better. He has decreased the amount of FF he eats.     ECG 4/22/2025: Normal sinus rhythm, normal ECG.      Histories     Past Medical History:   has a past medical history of Anxiety, Depression, Diabetes mellitus (HCC), GERD (gastroesophageal reflux disease), Hyperlipidemia, Hypertension, Migraines, and Seasonal allergies.    Surgical History:   has a past surgical history that includes Colonoscopy and Septoplasty (Bilateral, 3/14/2022).     Social History:   reports that he has never smoked. He has never used smokeless tobacco. He reports current alcohol use of about 3.0 standard drinks of alcohol per week. He reports that he does not use drugs.     Family History:  No evidence for sudden cardiac death or premature CAD      Medications:     Home medications were reviewed and are listed below    Prior to Admission medications    Medication Sig Start Date End Date Taking? Authorizing Provider   lamoTRIgine (LAMICTAL) 200 MG tablet Take 1 tablet by mouth daily   Yes ProviderKiah MD   chlorthalidone (HYGROTON) 25 MG tablet Take 0.5 tablets by mouth daily 4/7/25  Yes Emelyn Arisa MD   albuterol sulfate HFA (VENTOLIN HFA) 108 (90 Base) MCG/ACT inhaler Inhale 2 puffs into the lungs 4 times daily as needed

## 2025-04-23 ENCOUNTER — OFFICE VISIT (OUTPATIENT)
Dept: INTERNAL MEDICINE CLINIC | Age: 30
End: 2025-04-23
Payer: COMMERCIAL

## 2025-04-23 VITALS
BODY MASS INDEX: 26.72 KG/M2 | HEART RATE: 86 BPM | DIASTOLIC BLOOD PRESSURE: 62 MMHG | WEIGHT: 186.2 LBS | TEMPERATURE: 97.5 F | SYSTOLIC BLOOD PRESSURE: 115 MMHG | OXYGEN SATURATION: 98 %

## 2025-04-23 DIAGNOSIS — R68.89 EXERCISE INTOLERANCE: ICD-10-CM

## 2025-04-23 DIAGNOSIS — I10 ESSENTIAL HYPERTENSION: Primary | ICD-10-CM

## 2025-04-23 DIAGNOSIS — R79.89 ABNORMAL LFTS: ICD-10-CM

## 2025-04-23 DIAGNOSIS — I10 ESSENTIAL HYPERTENSION: ICD-10-CM

## 2025-04-23 DIAGNOSIS — E11.65 TYPE 2 DIABETES MELLITUS WITH HYPERGLYCEMIA, WITH LONG-TERM CURRENT USE OF INSULIN (HCC): ICD-10-CM

## 2025-04-23 DIAGNOSIS — Z79.4 TYPE 2 DIABETES MELLITUS WITH HYPERGLYCEMIA, WITH LONG-TERM CURRENT USE OF INSULIN (HCC): ICD-10-CM

## 2025-04-23 PROCEDURE — 3078F DIAST BP <80 MM HG: CPT | Performed by: INTERNAL MEDICINE

## 2025-04-23 PROCEDURE — 99214 OFFICE O/P EST MOD 30 MIN: CPT | Performed by: INTERNAL MEDICINE

## 2025-04-23 PROCEDURE — 3044F HG A1C LEVEL LT 7.0%: CPT | Performed by: INTERNAL MEDICINE

## 2025-04-23 PROCEDURE — 3074F SYST BP LT 130 MM HG: CPT | Performed by: INTERNAL MEDICINE

## 2025-04-23 RX ORDER — BUSPIRONE HYDROCHLORIDE 5 MG/1
5 TABLET ORAL ONCE AS NEEDED
COMMUNITY

## 2025-04-23 ASSESSMENT — ENCOUNTER SYMPTOMS
ABDOMINAL PAIN: 0
BLOOD IN STOOL: 0
COUGH: 0
VOMITING: 0
NAUSEA: 0
SHORTNESS OF BREATH: 0
SORE THROAT: 0

## 2025-04-23 NOTE — PROGRESS NOTES
Macho Forde (:  1995) is a 30 y.o. male, New patient, here for evaluation of the following chief complaint(s):  1 Month Follow-Up (No CC) and Diabetes      Assessment & Plan   ASSESSMENT/PLAN:  1. Essential hypertension  - Stable   - Continue current dose of chlorthalidone 12.5 mg daily and lisinopril 20 mg daily  -     Comprehensive Metabolic Panel; Future  2. Type 2 diabetes mellitus with hyperglycemia, with long-term current use of insulin (HCC)  - Stable   - Continue current dose of  Ozempic 0.5 mg q. weekly and metformin 500 mg once daily.  Advised patient to stop metformin if he develops hypoglycemia   3. Exercise intolerance  Chronic problem  Patient is unable to exercise more than 20 minutes due to shortness of breath.  Patient states that his shortness of breath improves with albuterol but he feels very tired to continue exercising.  Etiology of excess intolerance is unclear, probably exercise-induced asthma  Patient is following with cardiology for further workup including stress test.  Ordered lung function test for evaluation of pulmonary causes, will treat based on results.  Advised patient to use albuterol as needed for shortness of breath.    -     Full PFT Study With Bronchodilator; Future  4. Abnormal LFTs  Chronic, improving.  Patient has seen gastroenterology for this problem.  He was told by them that his abnormal liver function test is probably due to acute hepatitis A that is resolving, patient states that he received immunizations for hepatitis A and B after that.  Repeat liver function test today, further management as per patient's gastroenterologist.  -     Comprehensive Metabolic Panel; Future    Return in about 5 weeks (around 2025).         Subjective   SUBJECTIVE/OBJECTIVE:  Diabetes  He presents for his follow-up diabetic visit. He has type 2 diabetes mellitus. His disease course has been improving. Pertinent negatives for hypoglycemia include no dizziness. Pertinent

## 2025-04-24 ENCOUNTER — RESULTS FOLLOW-UP (OUTPATIENT)
Dept: INTERNAL MEDICINE CLINIC | Age: 30
End: 2025-04-24

## 2025-04-24 LAB
ALBUMIN SERPL-MCNC: 4.4 G/DL (ref 3.4–5)
ALBUMIN/GLOB SERPL: 1.8 {RATIO} (ref 1.1–2.2)
ALP SERPL-CCNC: 31 U/L (ref 40–129)
ALT SERPL-CCNC: 38 U/L (ref 10–40)
ANION GAP SERPL CALCULATED.3IONS-SCNC: 11 MMOL/L (ref 3–16)
AST SERPL-CCNC: 25 U/L (ref 15–37)
BILIRUB SERPL-MCNC: 0.5 MG/DL (ref 0–1)
BUN SERPL-MCNC: 17 MG/DL (ref 7–20)
CALCIUM SERPL-MCNC: 9.7 MG/DL (ref 8.3–10.6)
CHLORIDE SERPL-SCNC: 98 MMOL/L (ref 99–110)
CO2 SERPL-SCNC: 28 MMOL/L (ref 21–32)
CREAT SERPL-MCNC: 1 MG/DL (ref 0.9–1.3)
GFR SERPLBLD CREATININE-BSD FMLA CKD-EPI: >90 ML/MIN/{1.73_M2}
GLUCOSE SERPL-MCNC: 160 MG/DL (ref 70–99)
POTASSIUM SERPL-SCNC: 3.9 MMOL/L (ref 3.5–5.1)
PROT SERPL-MCNC: 6.8 G/DL (ref 6.4–8.2)
SODIUM SERPL-SCNC: 137 MMOL/L (ref 136–145)

## 2025-05-07 ENCOUNTER — HOSPITAL ENCOUNTER (OUTPATIENT)
Age: 30
Discharge: HOME | End: 2025-05-09
Payer: COMMERCIAL

## 2025-05-07 DIAGNOSIS — R06.02 SHORTNESS OF BREATH: ICD-10-CM

## 2025-05-07 LAB
STRESS EXERCISE DUR MIN: 9 MIN
STRESS EXERCISE DUR SEC: 52 SEC
STRESS TARGET HR: 191 BPM

## 2025-05-07 PROCEDURE — 93018 CV STRESS TEST I&R ONLY: CPT | Performed by: INTERNAL MEDICINE

## 2025-05-07 PROCEDURE — 93016 CV STRESS TEST SUPVJ ONLY: CPT | Performed by: INTERNAL MEDICINE

## 2025-05-07 PROCEDURE — 93017 CV STRESS TEST TRACING ONLY: CPT

## 2025-05-13 ENCOUNTER — RESULTS FOLLOW-UP (OUTPATIENT)
Dept: CARDIOLOGY CLINIC | Age: 30
End: 2025-05-13

## 2025-05-15 DIAGNOSIS — E11.65 TYPE 2 DIABETES MELLITUS WITH HYPERGLYCEMIA, WITH LONG-TERM CURRENT USE OF INSULIN (HCC): ICD-10-CM

## 2025-05-15 DIAGNOSIS — Z79.4 TYPE 2 DIABETES MELLITUS WITH HYPERGLYCEMIA, WITH LONG-TERM CURRENT USE OF INSULIN (HCC): ICD-10-CM

## 2025-05-19 RX ORDER — SEMAGLUTIDE 0.68 MG/ML
INJECTION, SOLUTION SUBCUTANEOUS
Qty: 3 ML | Refills: 1 | Status: SHIPPED | OUTPATIENT
Start: 2025-05-19

## 2025-05-28 ENCOUNTER — OFFICE VISIT (OUTPATIENT)
Dept: INTERNAL MEDICINE CLINIC | Age: 30
End: 2025-05-28
Payer: COMMERCIAL

## 2025-05-28 VITALS
WEIGHT: 189 LBS | BODY MASS INDEX: 27.12 KG/M2 | OXYGEN SATURATION: 98 % | HEART RATE: 70 BPM | DIASTOLIC BLOOD PRESSURE: 60 MMHG | TEMPERATURE: 97.6 F | SYSTOLIC BLOOD PRESSURE: 108 MMHG

## 2025-05-28 DIAGNOSIS — R11.0 NAUSEA: ICD-10-CM

## 2025-05-28 DIAGNOSIS — F33.1 MODERATE EPISODE OF RECURRENT MAJOR DEPRESSIVE DISORDER (HCC): ICD-10-CM

## 2025-05-28 DIAGNOSIS — I10 ESSENTIAL HYPERTENSION: ICD-10-CM

## 2025-05-28 DIAGNOSIS — R10.13 DYSPEPSIA: ICD-10-CM

## 2025-05-28 DIAGNOSIS — H66.93 ACUTE INFECTION OF BOTH EARS: Primary | ICD-10-CM

## 2025-05-28 DIAGNOSIS — E11.65 TYPE 2 DIABETES MELLITUS WITH HYPERGLYCEMIA, WITH LONG-TERM CURRENT USE OF INSULIN (HCC): ICD-10-CM

## 2025-05-28 DIAGNOSIS — R68.89 EXERCISE INTOLERANCE: ICD-10-CM

## 2025-05-28 DIAGNOSIS — F41.0 GENERALIZED ANXIETY DISORDER WITH PANIC ATTACKS: ICD-10-CM

## 2025-05-28 DIAGNOSIS — Z79.4 TYPE 2 DIABETES MELLITUS WITH HYPERGLYCEMIA, WITH LONG-TERM CURRENT USE OF INSULIN (HCC): ICD-10-CM

## 2025-05-28 DIAGNOSIS — F41.1 GENERALIZED ANXIETY DISORDER WITH PANIC ATTACKS: ICD-10-CM

## 2025-05-28 LAB — HBA1C MFR BLD: 5.2 %

## 2025-05-28 PROCEDURE — 3074F SYST BP LT 130 MM HG: CPT | Performed by: INTERNAL MEDICINE

## 2025-05-28 PROCEDURE — 83036 HEMOGLOBIN GLYCOSYLATED A1C: CPT | Performed by: INTERNAL MEDICINE

## 2025-05-28 PROCEDURE — G2211 COMPLEX E/M VISIT ADD ON: HCPCS | Performed by: INTERNAL MEDICINE

## 2025-05-28 PROCEDURE — 99215 OFFICE O/P EST HI 40 MIN: CPT | Performed by: INTERNAL MEDICINE

## 2025-05-28 PROCEDURE — 3078F DIAST BP <80 MM HG: CPT | Performed by: INTERNAL MEDICINE

## 2025-05-28 PROCEDURE — 3044F HG A1C LEVEL LT 7.0%: CPT | Performed by: INTERNAL MEDICINE

## 2025-05-28 RX ORDER — AMOXICILLIN 875 MG/1
875 TABLET, COATED ORAL 2 TIMES DAILY
Qty: 14 TABLET | Refills: 0 | Status: SHIPPED | OUTPATIENT
Start: 2025-05-28 | End: 2025-06-04

## 2025-05-28 RX ORDER — ONDANSETRON 4 MG/1
4 TABLET, FILM COATED ORAL EVERY 12 HOURS PRN
Qty: 30 TABLET | Refills: 0 | Status: SHIPPED | OUTPATIENT
Start: 2025-05-28

## 2025-05-28 ASSESSMENT — ENCOUNTER SYMPTOMS
SORE THROAT: 0
SHORTNESS OF BREATH: 0
COUGH: 0
ABDOMINAL PAIN: 0
RHINORRHEA: 0
NAUSEA: 0
BLOOD IN STOOL: 0
VOMITING: 0

## 2025-05-28 NOTE — PROGRESS NOTES
Macho Forde (:  1995) is a 30 y.o. male, Established patient, here for evaluation of the following chief complaint(s):  Follow-up (5 week/HTN/Ear infxn (B)/)      Assessment & Plan   ASSESSMENT/PLAN:  1. Acute infection of both ears  Uncontrolled  Symptoms ongoing for the past 3 weeks  Patient complains of bilateral ear discharge, ear pain and ear fullness.  Patient was seen in urgent care for the symptoms and he was given Ciprodex eardrops that improved his symptoms temporarily, patient also underwent earwax removal at urgent care.  On exam patient has bilateral purulent ear discharge.  Starting patient on amoxicillin to treat possible bacterial infection.  Advised patient to follow-up with ENT if symptoms are not improving with amoxicillin.  -     amoxicillin (AMOXIL) 875 MG tablet; Take 1 tablet by mouth 2 times daily for 7 days, Disp-14 tablet, R-0Normal  -     Srini Persaud DO, Otolaryngology, [place]  2. Nausea  Chronic, stable  Continue ondansetron as needed for nausea  -     ondansetron (ZOFRAN) 4 MG tablet; Take 1 tablet by mouth every 12 hours as needed for Nausea or Vomiting, Disp-30 tablet, R-0Normal  3. Type 2 diabetes mellitus with hyperglycemia, with long-term current use of insulin (HCC)  - Stable   - Continue current dose of Ozempic  Advised patient to stop metformin since he is developing hypoglycemic episodes.  Recommended to continue low-carb diet and regular exercise for sugar control  -     POCT glycosylated hemoglobin (Hb A1C)  4. Essential hypertension  - Stable   - Continue current dose of lisinopril.  Advised patient to stop chlorthalidone since his blood pressure is low.  Advised patient to check his blood pressure daily at home and bring the log during next follow-up.  5. Generalized anxiety disorder with panic attacks  Stable.  Follows with psychiatry.  6. Moderate episode of recurrent major depressive disorder (HCC)  Stable.  Follows with psychiatry.  7.

## 2025-06-17 DIAGNOSIS — R10.13 DYSPEPSIA: ICD-10-CM

## 2025-06-18 NOTE — TELEPHONE ENCOUNTER
Last appointment: 5/28/2025  Next appointment: 6/25/2025        Last refill: 10/30/2024) by Emelyn Arias MD

## 2025-06-20 RX ORDER — OMEPRAZOLE 20 MG/1
CAPSULE, DELAYED RELEASE ORAL DAILY
Qty: 90 CAPSULE | Refills: 1 | Status: SHIPPED | OUTPATIENT
Start: 2025-06-20

## 2025-06-23 DIAGNOSIS — I10 ESSENTIAL HYPERTENSION: ICD-10-CM

## 2025-06-24 RX ORDER — LISINOPRIL 20 MG/1
20 TABLET ORAL DAILY
Qty: 90 TABLET | Refills: 0 | Status: SHIPPED | OUTPATIENT
Start: 2025-06-24

## 2025-06-24 NOTE — TELEPHONE ENCOUNTER
Last appointment: 5/28/2025  Next appointment: 6/25/2025        Last refill: (12/4/2024) by Emelyn Arias MD

## 2025-06-25 ENCOUNTER — OFFICE VISIT (OUTPATIENT)
Dept: INTERNAL MEDICINE CLINIC | Age: 30
End: 2025-06-25
Payer: COMMERCIAL

## 2025-06-25 ENCOUNTER — TELEPHONE (OUTPATIENT)
Dept: INTERNAL MEDICINE CLINIC | Age: 30
End: 2025-06-25

## 2025-06-25 VITALS
HEART RATE: 83 BPM | DIASTOLIC BLOOD PRESSURE: 65 MMHG | BODY MASS INDEX: 25.94 KG/M2 | TEMPERATURE: 98 F | SYSTOLIC BLOOD PRESSURE: 118 MMHG | OXYGEN SATURATION: 98 % | WEIGHT: 180.8 LBS

## 2025-06-25 DIAGNOSIS — I10 ESSENTIAL HYPERTENSION: ICD-10-CM

## 2025-06-25 DIAGNOSIS — Z79.4 TYPE 2 DIABETES MELLITUS WITH HYPERGLYCEMIA, WITH LONG-TERM CURRENT USE OF INSULIN (HCC): Primary | ICD-10-CM

## 2025-06-25 DIAGNOSIS — E11.65 TYPE 2 DIABETES MELLITUS WITH HYPERGLYCEMIA, WITH LONG-TERM CURRENT USE OF INSULIN (HCC): Primary | ICD-10-CM

## 2025-06-25 DIAGNOSIS — H92.13 EAR DRAINAGE, BILATERAL: ICD-10-CM

## 2025-06-25 PROCEDURE — 3044F HG A1C LEVEL LT 7.0%: CPT | Performed by: INTERNAL MEDICINE

## 2025-06-25 PROCEDURE — 3078F DIAST BP <80 MM HG: CPT | Performed by: INTERNAL MEDICINE

## 2025-06-25 PROCEDURE — G2211 COMPLEX E/M VISIT ADD ON: HCPCS | Performed by: INTERNAL MEDICINE

## 2025-06-25 PROCEDURE — 99214 OFFICE O/P EST MOD 30 MIN: CPT | Performed by: INTERNAL MEDICINE

## 2025-06-25 PROCEDURE — 3074F SYST BP LT 130 MM HG: CPT | Performed by: INTERNAL MEDICINE

## 2025-06-25 RX ORDER — CIPROFLOXACIN 0.5 MG/.25ML
0.25 SOLUTION/ DROPS AURICULAR (OTIC) 2 TIMES DAILY
Qty: 20 EACH | Refills: 0 | Status: SHIPPED | OUTPATIENT
Start: 2025-06-25 | End: 2025-07-02

## 2025-06-25 NOTE — TELEPHONE ENCOUNTER
Shannon with [facility] pharmacy called into the office for Dr. Arias in regards to the patients ciprofloxacin HCl (CETRAXAL) 0.2 % otic solution. They are needing the duration, will the patient only be taking the medication for 10 days? And if so they would need to increase the quantity to 40. She also mentioned the containers coming in individually instead of a bottle. Please advise.     Pharmacy    [id number] - [place], [place] - [address] [phone] - F [phone] [address] [facility] [address]  Phone: [phone]  Fax: [phone]

## 2025-06-25 NOTE — PROGRESS NOTES
Macho Forde (:  1995) is a 30 y.o. male, Established patient, here for evaluation of the following chief complaint(s):  Hypertension and Diabetes      Assessment & Plan   ASSESSMENT/PLAN:  1. Type 2 diabetes mellitus with hyperglycemia, with long-term current use of insulin (HCC)  - Stable   - Continue current dose of Ozempic  2. Essential hypertension  - Stable   - Continue current dose of lisinopril.  -     Basic Metabolic Panel; Future  3. Ear drainage, bilateral  Chronic, uncontrolled.  Patient complains of bilateral ear discharge, ear pain and ear fullness.  On exam patient has bilateral purulent ear discharge.  Patient has an upcoming appointment with ENT for further management.  Prescribed ciprofloxacin eardrops to treat probable ear infection until patient can see ENT    Return in about 3 months (around 2025).         Subjective   SUBJECTIVE/OBJECTIVE:  Ear pain, started 3 weeks ago, ear wax removed earlier this month at urgent care, tried Ciprodex ear drops with relief, now symptoms are worsening again.    Ear Drainage   There is pain in both ears. This is a new problem. The current episode started 1 to 4 weeks ago. The problem occurs constantly. The problem has been gradually worsening. There has been no fever. The pain is mild. Associated symptoms include ear discharge and hearing loss. Pertinent negatives include no abdominal pain, coughing, rhinorrhea, sore throat or vomiting. He has tried ear drops for the symptoms. The treatment provided no relief.   Diabetes  He presents for his follow-up diabetic visit. He has type 2 diabetes mellitus. His disease course has been improving. Pertinent negatives for hypoglycemia include no dizziness. Pertinent negatives for diabetes include no chest pain, no fatigue and no weakness. Current diabetic treatment includes oral agent (dual therapy). He is compliant with treatment all of the time. His weight is decreasing steadily. He is following a

## 2025-06-26 ENCOUNTER — RESULTS FOLLOW-UP (OUTPATIENT)
Dept: INTERNAL MEDICINE CLINIC | Age: 30
End: 2025-06-26

## 2025-06-26 LAB
ANION GAP SERPL CALCULATED.3IONS-SCNC: 11 MMOL/L (ref 3–16)
BUN SERPL-MCNC: 15 MG/DL (ref 7–20)
CALCIUM SERPL-MCNC: 9.3 MG/DL (ref 8.3–10.6)
CHLORIDE SERPL-SCNC: 104 MMOL/L (ref 99–110)
CO2 SERPL-SCNC: 27 MMOL/L (ref 21–32)
CREAT SERPL-MCNC: 1 MG/DL (ref 0.9–1.3)
GFR SERPLBLD CREATININE-BSD FMLA CKD-EPI: >90 ML/MIN/{1.73_M2}
GLUCOSE SERPL-MCNC: 98 MG/DL (ref 70–99)
POTASSIUM SERPL-SCNC: 3.7 MMOL/L (ref 3.5–5.1)
SODIUM SERPL-SCNC: 142 MMOL/L (ref 136–145)

## 2025-06-27 ENCOUNTER — TELEPHONE (OUTPATIENT)
Dept: INTERNAL MEDICINE CLINIC | Age: 30
End: 2025-06-27

## 2025-06-27 NOTE — TELEPHONE ENCOUNTER
[facility] pharmacy called into the office regards to the patients ciprofloxacin HCl (CETRAXAL) 0.2 % otic solution. They needing how many drops to use for the dropper. Please advise.     Pharmacy    [id number] - [place], [place] - [address] [phone] - F [phone] [address] [facility] [id number]  Phone: [phone]  Fax: [phone]

## 2025-06-29 NOTE — TELEPHONE ENCOUNTER
Patient has to use 0.25 mL ciprofloxacin eardrops twice daily in each ear, please request pharmacist to convert the mL to number of drops.

## 2025-07-01 NOTE — TELEPHONE ENCOUNTER
[facility] called back into the office in regards to the patients ear drops. Did let them know per Dr. Arias:    Patient has to use 0.25 mL ciprofloxacin eardrops twice daily in each ear, please request pharmacist to convert the mL to number of drops.     They stated the Rx is written for prefilled drops and if the provider is wanting the prefilled drops the quantity of the medication will have to be changed. If it wants the Rx for per drops, he will have to change the drug completely if he just want drops. The original script was for pre drops. Please advise.

## 2025-07-02 DIAGNOSIS — H92.13 EAR DRAINAGE, BILATERAL: Primary | ICD-10-CM

## 2025-07-02 RX ORDER — CIPROFLOXACIN AND DEXAMETHASONE 3; 1 MG/ML; MG/ML
4 SUSPENSION/ DROPS AURICULAR (OTIC) 2 TIMES DAILY
Qty: 7.5 ML | Refills: 0 | Status: SHIPPED | OUTPATIENT
Start: 2025-07-02 | End: 2025-07-09

## 2025-07-08 ENCOUNTER — TELEPHONE (OUTPATIENT)
Dept: ADMINISTRATIVE | Age: 30
End: 2025-07-08

## 2025-07-08 NOTE — TELEPHONE ENCOUNTER
Submitted PA for Ciprofloxacin-dexAMETHasone 0.3-0.1% suspension   Via CMM key: [id number] STATUS: not sent    I need last chart note completed so I can send it with the PA. Please advise    If this requires a response please respond to the pool. (P [facility] MEDICINE Pre-Auth).    Please advise patient thank you.

## 2025-07-14 DIAGNOSIS — E11.65 TYPE 2 DIABETES MELLITUS WITH HYPERGLYCEMIA, WITH LONG-TERM CURRENT USE OF INSULIN (HCC): ICD-10-CM

## 2025-07-14 DIAGNOSIS — Z79.4 TYPE 2 DIABETES MELLITUS WITH HYPERGLYCEMIA, WITH LONG-TERM CURRENT USE OF INSULIN (HCC): ICD-10-CM

## 2025-07-14 RX ORDER — SEMAGLUTIDE 0.68 MG/ML
INJECTION, SOLUTION SUBCUTANEOUS
Qty: 3 ML | Refills: 0 | Status: SHIPPED | OUTPATIENT
Start: 2025-07-14

## 2025-07-14 NOTE — TELEPHONE ENCOUNTER
Thank you.  Questions .    RESubmitted PA for Ciprofloxacin-dexAMETHasone 0.3-0.1% suspension   Via CMM KEY: [id number] STATUS: PENDING.    Follow up done daily; if no decision with in three days we will refax.  If another three days goes by with no decision will call the insurance for status.

## 2025-07-14 NOTE — TELEPHONE ENCOUNTER
Last appointment: 6/25/2025  Next appointment: 9/24/2025      Last refill: (5/19/2025) by Emelyn Arias MD

## 2025-07-15 NOTE — TELEPHONE ENCOUNTER
Please inform patient that his Ciprodex eyedrops prescription is denied by his insurance.  Please advise patient to follow-up with his ENT for further management of his ear infection.

## 2025-07-18 ENCOUNTER — OFFICE VISIT (OUTPATIENT)
Dept: ENT CLINIC | Age: 30
End: 2025-07-18

## 2025-07-18 VITALS
SYSTOLIC BLOOD PRESSURE: 129 MMHG | HEIGHT: 70 IN | BODY MASS INDEX: 26.23 KG/M2 | WEIGHT: 183.2 LBS | HEART RATE: 74 BPM | DIASTOLIC BLOOD PRESSURE: 80 MMHG | TEMPERATURE: 97.1 F

## 2025-07-18 DIAGNOSIS — H60.8X3 CHRONIC ECZEMATOUS OTITIS EXTERNA OF BOTH EARS: ICD-10-CM

## 2025-07-18 DIAGNOSIS — H92.12 OTORRHEA OF LEFT EAR: ICD-10-CM

## 2025-07-18 DIAGNOSIS — H60.93 RECURRENT OTITIS EXTERNA OF BOTH EARS: Primary | ICD-10-CM

## 2025-07-18 RX ORDER — OFLOXACIN 3 MG/ML
5 SOLUTION AURICULAR (OTIC) 2 TIMES DAILY
Qty: 10 ML | Refills: 0 | Status: SHIPPED | OUTPATIENT
Start: 2025-07-18 | End: 2025-07-28

## 2025-07-18 ASSESSMENT — ENCOUNTER SYMPTOMS
SINUS PRESSURE: 0
BLOOD IN STOOL: 0
VOMITING: 0
NAUSEA: 0
STRIDOR: 0
SHORTNESS OF BREATH: 0
SORE THROAT: 0
TROUBLE SWALLOWING: 0
EYE DISCHARGE: 0
RHINORRHEA: 0
WHEEZING: 0
SINUS PAIN: 0
VOICE CHANGE: 0
BACK PAIN: 0
COLOR CHANGE: 0
FACIAL SWELLING: 0
CONSTIPATION: 0
PHOTOPHOBIA: 0
DIARRHEA: 0
EYE ITCHING: 0
COUGH: 0
CHOKING: 0

## 2025-07-18 NOTE — PROGRESS NOTES
[facility]  [address] , [address]  [place] [address]  P: [phone]  F: [phone]       Patient     Macho Forde  1995    ChiefComplaint     Chief Complaint   Patient presents with    New Patient    Ear Problem     Hx of ear infections. Denies hearing issues       History of Present Illness     Macho Forde is a pleasant 30 y.o. male who presents as a new patient for bilateral ear infections.  Has been experiencing ear pain, drainage and hearing loss for the past few months.  Started in the right ear and then moved to the left ear.  He was prescribed some eardrops but his insurance did not cover it.  He denies any prior history of ear infections.  He does have some itchiness of both ears.  Denies room spinning dizziness.  Denies tinnitus.  Denies a history of ear surgeries.    Past Medical History     Past Medical History:   Diagnosis Date    Anxiety     Depression     Diabetes mellitus (HCC)     GERD (gastroesophageal reflux disease)     Hyperlipidemia     Hypertension     Migraines     Seasonal allergies        Past Surgical History     Past Surgical History:   Procedure Laterality Date    COLONOSCOPY      SEPTOPLASTY Bilateral 3/14/2022    BILATERAL INFERIOR TURBINATE REDUCTION AND SEPTOPLASTY performed by Srini Souza DO at [facility] OR       Family History     Family History   Problem Relation Age of Onset    Cancer Mother         melanoma    Hypertension Mother     Diabetes Mother     Other Mother         concerns for hoarding    Sleep Apnea Father     Hypertension Father     Anxiety Disorder Father     Diabetes Father     Suicide Attempts Father         pills       Social History     Social History     Socioeconomic History    Marital status: Single     Spouse name: Not on file    Number of children: 0    Years of education: 18    Highest education level: Bachelor's degree (e.g., BA, AB, BS)   Occupational History    Occupation: supplemental education   Tobacco Use

## 2025-08-01 ENCOUNTER — OFFICE VISIT (OUTPATIENT)
Dept: ENT CLINIC | Age: 30
End: 2025-08-01

## 2025-08-01 VITALS — SYSTOLIC BLOOD PRESSURE: 126 MMHG | TEMPERATURE: 97.5 F | DIASTOLIC BLOOD PRESSURE: 81 MMHG | HEART RATE: 67 BPM

## 2025-08-01 DIAGNOSIS — B36.9 FUNGAL OTITIS EXTERNA: Primary | ICD-10-CM

## 2025-08-01 DIAGNOSIS — H62.40 FUNGAL OTITIS EXTERNA: Primary | ICD-10-CM

## 2025-08-01 ASSESSMENT — ENCOUNTER SYMPTOMS
COUGH: 0
COLOR CHANGE: 0
EYE ITCHING: 0
DIARRHEA: 0
SINUS PRESSURE: 0
VOICE CHANGE: 0
RHINORRHEA: 0
EYE REDNESS: 0
SINUS PAIN: 0
SHORTNESS OF BREATH: 0
NAUSEA: 0
EYE PAIN: 0
STRIDOR: 0
TROUBLE SWALLOWING: 0
PHOTOPHOBIA: 0
SORE THROAT: 0
CHOKING: 0
FACIAL SWELLING: 0

## 2025-08-01 NOTE — PROGRESS NOTES
[facility]  [address] , [address]  [place] [address]  P: [phone]  F: [phone]       Patient     Macho Forde  1995    ChiefComplaint     Chief Complaint   Patient presents with    Follow-up     Ear follow up. States still fullness, itching       History of Present Illness     Macho Forde is a pleasant 30 y.o. male here for 2-week follow-up for otitis externa.  He use the drops with some symptom improvement and they returned.  Having drainage.    Past Medical History     Past Medical History:   Diagnosis Date    Anxiety     Depression     Diabetes mellitus (HCC)     GERD (gastroesophageal reflux disease)     Hyperlipidemia     Hypertension     Migraines     Seasonal allergies        Past Surgical History     Past Surgical History:   Procedure Laterality Date    COLONOSCOPY      SEPTOPLASTY Bilateral 3/14/2022    BILATERAL INFERIOR TURBINATE REDUCTION AND SEPTOPLASTY performed by Srini Souza DO at [facility] OR       Family History     Family History   Problem Relation Age of Onset    Cancer Mother         melanoma    Hypertension Mother     Diabetes Mother     Other Mother         concerns for hoarding    Sleep Apnea Father     Hypertension Father     Anxiety Disorder Father     Diabetes Father     Suicide Attempts Father         pills       Social History     Social History     Socioeconomic History    Marital status: Single     Spouse name: Not on file    Number of children: 0    Years of education: 18    Highest education level: Bachelor's degree (e.g., BA, AB, BS)   Occupational History    Occupation: supplemental education   Tobacco Use    Smoking status: Never    Smokeless tobacco: Never   Vaping Use    Vaping status: Never Used   Substance and Sexual Activity    Alcohol use: Yes     Alcohol/week: 3.0 standard drinks of alcohol     Types: 3 Drinks containing 0.5 oz of alcohol per week     Comment: Occasional, 2-3 x/wk, 1 mixed drink    Drug use: Never    Sexual

## 2025-08-03 LAB
BACTERIA SPEC AEROBE CULT: ABNORMAL
BACTERIA SPEC AEROBE CULT: ABNORMAL
BACTERIA SPEC ANAEROBE CULT: ABNORMAL
GRAM STN SPEC: ABNORMAL
ORGANISM: ABNORMAL
ORGANISM: ABNORMAL

## 2025-08-06 LAB
BACTERIA SPEC AEROBE CULT: ABNORMAL
BACTERIA SPEC ANAEROBE CULT: ABNORMAL
GRAM STN SPEC: ABNORMAL
ORGANISM: ABNORMAL

## 2025-08-08 ENCOUNTER — OFFICE VISIT (OUTPATIENT)
Dept: ENT CLINIC | Age: 30
End: 2025-08-08
Payer: COMMERCIAL

## 2025-08-08 VITALS — HEART RATE: 59 BPM | SYSTOLIC BLOOD PRESSURE: 135 MMHG | DIASTOLIC BLOOD PRESSURE: 87 MMHG | TEMPERATURE: 97.3 F

## 2025-08-08 DIAGNOSIS — B36.9 FUNGAL OTITIS EXTERNA: Primary | ICD-10-CM

## 2025-08-08 DIAGNOSIS — H62.40 FUNGAL OTITIS EXTERNA: Primary | ICD-10-CM

## 2025-08-08 PROCEDURE — 92504 EAR MICROSCOPY EXAMINATION: CPT | Performed by: OTOLARYNGOLOGY

## 2025-08-08 PROCEDURE — 99213 OFFICE O/P EST LOW 20 MIN: CPT | Performed by: OTOLARYNGOLOGY

## 2025-08-08 PROCEDURE — 3075F SYST BP GE 130 - 139MM HG: CPT | Performed by: OTOLARYNGOLOGY

## 2025-08-08 PROCEDURE — 3079F DIAST BP 80-89 MM HG: CPT | Performed by: OTOLARYNGOLOGY

## 2025-08-08 ASSESSMENT — ENCOUNTER SYMPTOMS
SINUS PAIN: 0
SHORTNESS OF BREATH: 0
NAUSEA: 0
RHINORRHEA: 0
FACIAL SWELLING: 0
CHOKING: 0
VOICE CHANGE: 0
PHOTOPHOBIA: 0
DIARRHEA: 0
COUGH: 0
EYE PAIN: 0
COLOR CHANGE: 0
EYE REDNESS: 0
SORE THROAT: 0
EYE ITCHING: 0
TROUBLE SWALLOWING: 0
SINUS PRESSURE: 0
STRIDOR: 0

## 2025-08-13 DIAGNOSIS — Z79.4 TYPE 2 DIABETES MELLITUS WITH HYPERGLYCEMIA, WITH LONG-TERM CURRENT USE OF INSULIN (HCC): ICD-10-CM

## 2025-08-13 DIAGNOSIS — E11.65 TYPE 2 DIABETES MELLITUS WITH HYPERGLYCEMIA, WITH LONG-TERM CURRENT USE OF INSULIN (HCC): ICD-10-CM

## 2025-08-13 RX ORDER — SEMAGLUTIDE 0.68 MG/ML
INJECTION, SOLUTION SUBCUTANEOUS
Qty: 3 ML | Refills: 0 | Status: SHIPPED | OUTPATIENT
Start: 2025-08-13

## (undated) DEVICE — STANDARD HYPODERMIC NEEDLE,POLYPROPYLENE HUB: Brand: MONOJECT

## (undated) DEVICE — NASAL FESS: Brand: MEDLINE INDUSTRIES, INC.

## (undated) DEVICE — SUTURE PROL SZ 3-0 L30IN NONABSORBABLE BLU L60MM KS STR REV 8622H

## (undated) DEVICE — TOWEL,STOP FLAG GOLD N-W: Brand: MEDLINE

## (undated) DEVICE — DRAPE,INSTRUMENT,MAGNETIC,10X16: Brand: MEDLINE

## (undated) DEVICE — SPONGE,NEURO,1"X3",XR,STRL,LF,10/PK: Brand: MEDLINE

## (undated) DEVICE — SPLINT 1524055 DOYLE II AIRWAY SET: Brand: DOYLE II ™

## (undated) DEVICE — SOLUTION IV 1000ML 0.9% SOD CHL

## (undated) DEVICE — SUTURE CHROMIC GUT SZ 4-0 L18IN ABSRB BRN L13MM P-3 3/8 CIR 1654G

## (undated) DEVICE — TOWEL,OR,DSP,ST,BLUE,DLX,8/PK,10PK/CS: Brand: MEDLINE

## (undated) DEVICE — SHEATH 1912000 5PK 4MM/0DEG STORZ XOMED: Brand: ENDO-SCRUB®

## (undated) DEVICE — BLADE 1882040HR 5PK M4 INF TURB 2MM ROT: Brand: STRAIGHTSHOT

## (undated) DEVICE — TUBE SUCT LAPSCP

## (undated) DEVICE — INTENDED FOR TISSUE SEPARATION, AND OTHER PROCEDURES THAT REQUIRE A SHARP SURGICAL BLADE TO PUNCTURE OR CUT.: Brand: BARD-PARKER ® CARBON RIB-BACK BLADES

## (undated) DEVICE — ELECTRODE ELECSURG NDL 2.8 INX7.2 CM COAT INSUL EDGE

## (undated) DEVICE — GLOVE ORANGE PI 7 1/2   MSG9075